# Patient Record
Sex: FEMALE | Race: BLACK OR AFRICAN AMERICAN | Employment: OTHER | URBAN - METROPOLITAN AREA
[De-identification: names, ages, dates, MRNs, and addresses within clinical notes are randomized per-mention and may not be internally consistent; named-entity substitution may affect disease eponyms.]

---

## 2017-03-31 ENCOUNTER — HOSPITAL ENCOUNTER (OUTPATIENT)
Dept: PHYSICAL THERAPY | Age: 82
End: 2017-03-31

## 2017-04-06 ENCOUNTER — HOSPITAL ENCOUNTER (OUTPATIENT)
Dept: PHYSICAL THERAPY | Age: 82
Discharge: HOME | End: 2017-04-06
Payer: MEDICARE

## 2017-04-06 PROCEDURE — 97161 PT EVAL LOW COMPLEX 20 MIN: CPT

## 2017-04-06 PROCEDURE — G8981 BODY POS CURRENT STATUS: HCPCS

## 2017-04-06 PROCEDURE — 97110 THERAPEUTIC EXERCISES: CPT

## 2017-04-06 PROCEDURE — G8982 BODY POS GOAL STATUS: HCPCS
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
## 2017-04-06 NOTE — PROGRESS NOTES
[facility]  [address]  [place], [address]  Phone: [phone]  Fax: [phone]    Plan of Care/Statement of Necessity for Physical Therapy Services  2-15    Patient name: Wilfrid Pathak  : 10/11/1932  Provider#: [id number]  Referral source: JACOB Michaud      Medical/Treatment Diagnosis: Neck pain [M54.2]     Prior Hospitalization: see medical history     Comorbidities: HTN controlled w/ meds   Prior Level of Function: able to sit, cook, perform ADLs w/out pain/limitation   Medications: Verified on Patient Summary List  Start of Care: 2017      Onset Date: long hx   The Plan of Care and following information is based on the information from the initial evaluation.     Assessment/ key information: pt is a 80year old female presents w/ signs/sx consistent w/ cerv DDD, postural dysfunction     Evaluation Complexity History LOW Complexity : Zero comorbidities / personal factors that will impact the outcome / POC; Examination LOW Complexity : 1-2 Standardized tests and measures addressing body structure, function, activity limitation and / or participation in recreation  ;Presentation LOW Complexity : Stable, uncomplicated  ;Clinical Decision Making MEDIUM Complexity : FOTO score of 26-74  Overall Complexity Rating: LOW     Problem List: pain affecting function, decrease ROM, decrease strength, decrease ADL/ functional abilitiies, decrease activity tolerance and decrease flexibility/ joint mobility   Treatment Plan may include any combination of the following: Therapeutic exercise, Therapeutic activities, Neuromuscular re-education, Physical agent/modality, Manual therapy and Patient education  Patient / Family readiness to learn indicated by: asking questions, trying to perform skills and interest  Persons(s) to be included in education: patient (P) and family support person (FSP);list   Barriers to Learning/Limitations: yes;  other memory   Patient Goal (s): anything to help pain go away  Patient Self Reported Health Status: fair  Rehabilitation Potential: fair    Short Term Goals: To be accomplished in 1-2 weeks:  1) Pt will be independent with HEP  2) Pt will demonstrate understanding/application of postural principles without aggravation symptoms  3) Pt will report >/=25% decrease in symptoms  4) Pt will report understanding/application of sleeping position recommendations to minimize aggravation of symptoms     Long Term Goals: To be accomplished in 4-6 weeks:  1) Pt will report >/=50% decrease in neck pain   2) Pt will demonstrate ability to hold head upright >/= 10 seconds for improved postural strength/stability   3) Pt w/ demonstrate AROM cerv spine w/out incr pain for improved mobility     Frequency / Duration: Patient to be seen 1-2 times per week for 4-6 weeks. Patient/ Caregiver education and instruction: self care, activity modification, brace/ splint application and exercises    [x]  Plan of care has been reviewed with CHANDAN    G-Codes (GP)  Position   Current  CL= 60-79%   Goal  CK= 40-59%    The severity rating is based on clinical judgment and the FOTO Score score. Certification Period: 4/6/2017-7/1/2017    Sammi Douglass, PT 4/6/2017 3:02 PM    ________________________________________________________________________    I certify that the above Therapy Services are being furnished while the patient is under my care. I agree with the treatment plan and certify that this therapy is necessary.     [de-identified] Signature:____________________  Date:____________Time: _________

## 2017-04-12 ENCOUNTER — HOSPITAL ENCOUNTER (OUTPATIENT)
Dept: PHYSICAL THERAPY | Age: 82
Discharge: HOME | End: 2017-04-12
Payer: MEDICARE

## 2017-04-12 PROCEDURE — 97110 THERAPEUTIC EXERCISES: CPT

## 2017-04-12 PROCEDURE — 97140 MANUAL THERAPY 1/> REGIONS: CPT

## 2017-04-12 NOTE — PROGRESS NOTES
PT DAILY TREATMENT NOTE - [facility] 2-15    Patient Name: Diane Pak  Date:2017  : 10/11/1932  [x]  Patient  Verified  Payor: Lalitha Edward / Plan: [place] MEDICARE PART A & B / Product Type: Medicare /    In time: 3548 AM  Out time: 1135 AM  Total Treatment Time (min): 40  Total Timed Codes (min):  25  1:1 Treatment Time ( only): 25  Visit #: 2     Treatment Area: Neck pain [M54.2]    SUBJECTIVE  Pain Level (0-10 scale): 0/10  Any medication changes, allergies to medications, adverse drug reactions, diagnosis change, or new procedure performed?: [x] No    [] Yes (see summary sheet for update)  Subjective functional status/changes:   [] No changes reported  Pt reports \"hasn't hurt since you put that heat on me last time\"; pt reports shldr sore after initial visit; pt became emotional when discussing whether or not sleeping in bed at home, reports \"not an option at this time\"     OBJECTIVE    Modality rationale: decrease pain and increase tissue extensibility to improve the patients ability to sit, cook, perform ADLs w/out pain/limitation    Min Type Additional Details    [] Estim: []Att   []Unatt        []TENS instruct                  []IFC  []Premod   []NMES                     []Other:  []w/US   []w/ice   []w/heat  Position:  Location:    []  Traction: [] Cervical       []Lumbar                       [] Prone          []Supine                       []Intermittent   []Continuous Lbs:  [] before manual  [] after manual  []w/heat    []  Ultrasound: []Continuous   [] Pulsed at:                           []1MHz   []3MHz Location:  W/cm2:    [] Paraffin         Location:   []w/heat   10 []  Ice     [x]  Heat  []  Ice massage Position: sup  Location: thor spine, neck     []  Laser  []  Other: Position:  Location:      []  Vasopneumatic Device Pressure:       [] lo [] med [] hi   Temperature:      [x] Skin assessment post-treatment:  [x]intact []redness- no adverse reaction    []redness - adverse reaction:     15 min Therapeutic Exercise:  [x] See flow sheet : review HEP, review postural recommendations    Rationale: increase ROM and increase strength to improve the patients ability to sit, cook, perform ADLs w/out pain/limitation     10 min Manual Therapy:  STM bilat cerv paraspinal mm., bilat UT, lev scap mm. Rationale: decrease pain, increase ROM, increase tissue extensibility and decrease trigger points to improve the patients ability to sit, cook, perform ADLs w/out pain/limitation           With   [x] TE   [] TA   [] neuro   [] other: Patient Education: [x] Review HEP    [] Progressed/Changed HEP based on:   [x] positioning   [x] body mechanics   [] transfers   [x] heat/ice application    [] other:      Other Objective/Functional Measures:   nt     Pain Level (0-10 scale) post treatment: 0/10    ASSESSMENT/Changes in Function:     Pt w/ difficulty cherie STM due to severe tenderness, requested we discontinue and to transition to sitting which she did by flexing forward, then complaining of HA type sx; sx resolved and pt reviewed HEP, required cueing for technique w/ all ex; attempted to discuss sleeping position w/ pt and she got very emotional and stated that sleeping in the bed is not an option and she does not want to discuss as it will make her cry     Patient will continue to benefit from skilled PT services to modify and progress therapeutic interventions, address functional mobility deficits, address ROM deficits, address strength deficits, analyze and address soft tissue restrictions, analyze and cue movement patterns, analyze and modify body mechanics/ergonomics and assess and modify postural abnormalities to attain remaining goals.      []  See Plan of Care  []  See progress note/recertification  []  See Discharge Summary         Progress towards goals / Updated goals:  nt    PLAN  []  Upgrade activities as tolerated     [x]  Continue plan of care  []  Update interventions per flow sheet       []  Discharge due to:_  []  Other:_      Dawn Jackson, PT 4/12/2017  11:48 AM

## 2017-04-20 ENCOUNTER — APPOINTMENT (OUTPATIENT)
Dept: PHYSICAL THERAPY | Age: 82
End: 2017-04-20
Payer: MEDICARE

## 2017-04-27 ENCOUNTER — APPOINTMENT (OUTPATIENT)
Dept: PHYSICAL THERAPY | Age: 82
End: 2017-04-27
Payer: MEDICARE

## 2017-05-04 ENCOUNTER — APPOINTMENT (OUTPATIENT)
Dept: PHYSICAL THERAPY | Age: 82
End: 2017-05-04

## 2018-10-26 ENCOUNTER — APPOINTMENT (OUTPATIENT)
Dept: GENERAL RADIOLOGY | Age: 83
End: 2018-10-26
Attending: STUDENT IN AN ORGANIZED HEALTH CARE EDUCATION/TRAINING PROGRAM
Payer: MEDICARE

## 2018-10-26 ENCOUNTER — HOSPITAL ENCOUNTER (OUTPATIENT)
Age: 83
Setting detail: OBSERVATION
Discharge: NURSING, REHAB OR LONG-TERM CARE | End: 2018-10-31
Attending: STUDENT IN AN ORGANIZED HEALTH CARE EDUCATION/TRAINING PROGRAM | Admitting: INTERNAL MEDICINE
Payer: MEDICARE

## 2018-10-26 ENCOUNTER — APPOINTMENT (OUTPATIENT)
Dept: CT IMAGING | Age: 83
End: 2018-10-26
Attending: STUDENT IN AN ORGANIZED HEALTH CARE EDUCATION/TRAINING PROGRAM
Payer: MEDICARE
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
## 2018-10-26 DIAGNOSIS — E87.6 HYPOKALEMIA: Primary | ICD-10-CM

## 2018-10-26 PROBLEM — E11.649 TYPE 2 DIABETES MELLITUS WITH HYPOGLYCEMIA (HCC): Status: ACTIVE | Noted: 2018-10-26

## 2018-10-26 PROBLEM — M19.90 ARTHRITIS: Chronic | Status: ACTIVE | Noted: 2018-10-26

## 2018-10-26 PROBLEM — I10 HYPERTENSION: Chronic | Status: ACTIVE | Noted: 2018-10-26

## 2018-10-26 PROBLEM — E16.2 HYPOGLYCEMIA: Status: ACTIVE | Noted: 2018-10-26

## 2018-10-26 PROBLEM — R65.10 SIRS (SYSTEMIC INFLAMMATORY RESPONSE SYNDROME) (HCC): Status: ACTIVE | Noted: 2018-10-26

## 2018-10-26 PROBLEM — R53.1 GENERALIZED WEAKNESS: Status: ACTIVE | Noted: 2018-10-26

## 2018-10-26 LAB
ALBUMIN SERPL-MCNC: 3.3 G/DL (ref 3.5–5)
ALBUMIN/GLOB SERPL: 0.6 {RATIO} (ref 1.1–2.2)
ALP SERPL-CCNC: 55 U/L (ref 45–117)
ALT SERPL-CCNC: 25 U/L (ref 12–78)
ANION GAP SERPL CALC-SCNC: 11 MMOL/L (ref 5–15)
APPEARANCE UR: CLEAR
AST SERPL-CCNC: 67 U/L (ref 15–37)
ATRIAL RATE: 97 BPM
BACTERIA URNS QL MICRO: NEGATIVE /HPF
BASOPHILS # BLD: 0 K/UL (ref 0–0.1)
BASOPHILS NFR BLD: 0 % (ref 0–1)
BILIRUB SERPL-MCNC: 1.4 MG/DL (ref 0.2–1)
BILIRUB UR QL CFM: NEGATIVE
BUN SERPL-MCNC: 6 MG/DL (ref 6–20)
BUN/CREAT SERPL: 8 (ref 12–20)
CALCIUM SERPL-MCNC: 8.9 MG/DL (ref 8.5–10.1)
CALCULATED P AXIS, ECG09: 71 DEGREES
CALCULATED R AXIS, ECG10: -70 DEGREES
CALCULATED T AXIS, ECG11: 91 DEGREES
CHLORIDE SERPL-SCNC: 110 MMOL/L (ref 97–108)
CO2 SERPL-SCNC: 19 MMOL/L (ref 21–32)
COLOR UR: ABNORMAL
COMMENT, HOLDF: NORMAL
CREAT SERPL-MCNC: 0.78 MG/DL (ref 0.55–1.02)
DIAGNOSIS, 93000: NORMAL
DIFFERENTIAL METHOD BLD: ABNORMAL
EOSINOPHIL # BLD: 0.4 K/UL (ref 0–0.4)
EOSINOPHIL NFR BLD: 3 % (ref 0–7)
EPITH CASTS URNS QL MICRO: ABNORMAL /LPF
ERYTHROCYTE [DISTWIDTH] IN BLOOD BY AUTOMATED COUNT: 20.5 % (ref 11.5–14.5)
GLOBULIN SER CALC-MCNC: 5.4 G/DL (ref 2–4)
GLUCOSE BLD STRIP.AUTO-MCNC: 132 MG/DL (ref 65–100)
GLUCOSE BLD STRIP.AUTO-MCNC: 57 MG/DL (ref 65–100)
GLUCOSE BLD STRIP.AUTO-MCNC: 91 MG/DL (ref 65–100)
GLUCOSE SERPL-MCNC: 61 MG/DL (ref 65–100)
GLUCOSE UR STRIP.AUTO-MCNC: NEGATIVE MG/DL
HCT VFR BLD AUTO: 39.8 % (ref 35–47)
HGB BLD-MCNC: 11.8 G/DL (ref 11.5–16)
HGB UR QL STRIP: NEGATIVE
IMM GRANULOCYTES # BLD: 0 K/UL (ref 0–0.04)
IMM GRANULOCYTES NFR BLD AUTO: 0 % (ref 0–0.5)
KETONES UR QL STRIP.AUTO: 80 MG/DL
LACTATE SERPL-SCNC: 1.9 MMOL/L (ref 0.4–2)
LEUKOCYTE ESTERASE UR QL STRIP.AUTO: NEGATIVE
LYMPHOCYTES # BLD: 0.7 K/UL (ref 0.8–3.5)
LYMPHOCYTES NFR BLD: 5 % (ref 12–49)
MAGNESIUM SERPL-MCNC: 2 MG/DL (ref 1.6–2.4)
MCH RBC QN AUTO: 23.7 PG (ref 26–34)
MCHC RBC AUTO-ENTMCNC: 29.6 G/DL (ref 30–36.5)
MCV RBC AUTO: 80.1 FL (ref 80–99)
MONOCYTES # BLD: 1 K/UL (ref 0–1)
MONOCYTES NFR BLD: 7 % (ref 5–13)
NEUTS SEG # BLD: 12.8 K/UL (ref 1.8–8)
NEUTS SEG NFR BLD: 85 % (ref 32–75)
NITRITE UR QL STRIP.AUTO: NEGATIVE
NRBC # BLD: 0 K/UL (ref 0–0.01)
NRBC BLD-RTO: 0 PER 100 WBC
P-R INTERVAL, ECG05: 182 MS
PH UR STRIP: 6 [PH] (ref 5–8)
PLATELET # BLD AUTO: 415 K/UL (ref 150–400)
PMV BLD AUTO: 11.6 FL (ref 8.9–12.9)
POTASSIUM SERPL-SCNC: 2.8 MMOL/L (ref 3.5–5.1)
PROT SERPL-MCNC: 8.7 G/DL (ref 6.4–8.2)
PROT UR STRIP-MCNC: 30 MG/DL
Q-T INTERVAL, ECG07: 310 MS
QRS DURATION, ECG06: 88 MS
QTC CALCULATION (BEZET), ECG08: 393 MS
RBC # BLD AUTO: 4.97 M/UL (ref 3.8–5.2)
RBC #/AREA URNS HPF: ABNORMAL /HPF (ref 0–5)
RBC MORPH BLD: ABNORMAL
SAMPLES BEING HELD,HOLD: NORMAL
SERVICE CMNT-IMP: ABNORMAL
SERVICE CMNT-IMP: ABNORMAL
SERVICE CMNT-IMP: NORMAL
SODIUM SERPL-SCNC: 140 MMOL/L (ref 136–145)
SP GR UR REFRACTOMETRY: 1.02 (ref 1–1.03)
TROPONIN I SERPL-MCNC: <0.05 NG/ML
TROPONIN I SERPL-MCNC: <0.05 NG/ML
UR CULT HOLD, URHOLD: NORMAL
UROBILINOGEN UR QL STRIP.AUTO: 1 EU/DL (ref 0.2–1)
VENTRICULAR RATE, ECG03: 97 BPM
WBC # BLD AUTO: 14.9 K/UL (ref 3.6–11)
WBC URNS QL MICRO: ABNORMAL /HPF (ref 0–4)

## 2018-10-26 PROCEDURE — 70450 CT HEAD/BRAIN W/O DYE: CPT

## 2018-10-26 PROCEDURE — 82962 GLUCOSE BLOOD TEST: CPT

## 2018-10-26 PROCEDURE — 99218 HC RM OBSERVATION: CPT

## 2018-10-26 PROCEDURE — 96366 THER/PROPH/DIAG IV INF ADDON: CPT

## 2018-10-26 PROCEDURE — 81001 URINALYSIS AUTO W/SCOPE: CPT | Performed by: STUDENT IN AN ORGANIZED HEALTH CARE EDUCATION/TRAINING PROGRAM

## 2018-10-26 PROCEDURE — 74011250637 HC RX REV CODE- 250/637: Performed by: STUDENT IN AN ORGANIZED HEALTH CARE EDUCATION/TRAINING PROGRAM

## 2018-10-26 PROCEDURE — 71045 X-RAY EXAM CHEST 1 VIEW: CPT

## 2018-10-26 PROCEDURE — 83735 ASSAY OF MAGNESIUM: CPT | Performed by: INTERNAL MEDICINE

## 2018-10-26 PROCEDURE — G8978 MOBILITY CURRENT STATUS: HCPCS

## 2018-10-26 PROCEDURE — 80053 COMPREHEN METABOLIC PANEL: CPT | Performed by: STUDENT IN AN ORGANIZED HEALTH CARE EDUCATION/TRAINING PROGRAM

## 2018-10-26 PROCEDURE — 93005 ELECTROCARDIOGRAM TRACING: CPT

## 2018-10-26 PROCEDURE — 77030005563 HC CATH URETH INT MMGH -A

## 2018-10-26 PROCEDURE — G8979 MOBILITY GOAL STATUS: HCPCS

## 2018-10-26 PROCEDURE — 36415 COLL VENOUS BLD VENIPUNCTURE: CPT | Performed by: INTERNAL MEDICINE

## 2018-10-26 PROCEDURE — 97116 GAIT TRAINING THERAPY: CPT

## 2018-10-26 PROCEDURE — 99285 EMERGENCY DEPT VISIT HI MDM: CPT

## 2018-10-26 PROCEDURE — 74011250636 HC RX REV CODE- 250/636: Performed by: STUDENT IN AN ORGANIZED HEALTH CARE EDUCATION/TRAINING PROGRAM

## 2018-10-26 PROCEDURE — 96374 THER/PROPH/DIAG INJ IV PUSH: CPT

## 2018-10-26 PROCEDURE — 74011000250 HC RX REV CODE- 250: Performed by: STUDENT IN AN ORGANIZED HEALTH CARE EDUCATION/TRAINING PROGRAM

## 2018-10-26 PROCEDURE — 74011250636 HC RX REV CODE- 250/636: Performed by: INTERNAL MEDICINE

## 2018-10-26 PROCEDURE — 85025 COMPLETE CBC W/AUTO DIFF WBC: CPT | Performed by: STUDENT IN AN ORGANIZED HEALTH CARE EDUCATION/TRAINING PROGRAM

## 2018-10-26 PROCEDURE — 74011250637 HC RX REV CODE- 250/637: Performed by: INTERNAL MEDICINE

## 2018-10-26 PROCEDURE — 96365 THER/PROPH/DIAG IV INF INIT: CPT

## 2018-10-26 PROCEDURE — 51701 INSERT BLADDER CATHETER: CPT

## 2018-10-26 PROCEDURE — 83605 ASSAY OF LACTIC ACID: CPT | Performed by: INTERNAL MEDICINE

## 2018-10-26 PROCEDURE — 84484 ASSAY OF TROPONIN QUANT: CPT | Performed by: INTERNAL MEDICINE

## 2018-10-26 PROCEDURE — 97161 PT EVAL LOW COMPLEX 20 MIN: CPT

## 2018-10-26 RX ORDER — DEXTROSE, SODIUM CHLORIDE, SODIUM LACTATE, POTASSIUM CHLORIDE, AND CALCIUM CHLORIDE 5; .6; .31; .03; .02 G/100ML; G/100ML; G/100ML; G/100ML; G/100ML
75 INJECTION, SOLUTION INTRAVENOUS CONTINUOUS
Status: DISPENSED | OUTPATIENT
Start: 2018-10-26 | End: 2018-10-27

## 2018-10-26 RX ORDER — MELATONIN
1000 DAILY
Status: DISCONTINUED | OUTPATIENT
Start: 2018-10-27 | End: 2018-10-31 | Stop reason: HOSPADM

## 2018-10-26 RX ORDER — LATANOPROST 50 UG/ML
1 SOLUTION/ DROPS OPHTHALMIC
COMMUNITY
End: 2019-05-10

## 2018-10-26 RX ORDER — POTASSIUM CHLORIDE 7.45 MG/ML
10 INJECTION INTRAVENOUS
Status: COMPLETED | OUTPATIENT
Start: 2018-10-26 | End: 2018-10-26

## 2018-10-26 RX ORDER — AMLODIPINE BESYLATE 5 MG/1
5 TABLET ORAL DAILY
COMMUNITY
End: 2018-11-23

## 2018-10-26 RX ORDER — ONDANSETRON 2 MG/ML
4 INJECTION INTRAMUSCULAR; INTRAVENOUS
Status: DISCONTINUED | OUTPATIENT
Start: 2018-10-26 | End: 2018-10-31 | Stop reason: HOSPADM

## 2018-10-26 RX ORDER — POTASSIUM CHLORIDE 750 MG/1
40 TABLET, FILM COATED, EXTENDED RELEASE ORAL ONCE
Status: COMPLETED | OUTPATIENT
Start: 2018-10-26 | End: 2018-10-26

## 2018-10-26 RX ORDER — HYDROCHLOROTHIAZIDE 12.5 MG/1
12.5 TABLET ORAL DAILY
COMMUNITY
End: 2018-11-23

## 2018-10-26 RX ORDER — POTASSIUM CHLORIDE 20MEQ/15ML
20 LIQUID (ML) ORAL
Status: COMPLETED | OUTPATIENT
Start: 2018-10-26 | End: 2018-10-26

## 2018-10-26 RX ORDER — AMLODIPINE BESYLATE 5 MG/1
5 TABLET ORAL DAILY
Status: DISCONTINUED | OUTPATIENT
Start: 2018-10-27 | End: 2018-10-31 | Stop reason: HOSPADM

## 2018-10-26 RX ORDER — LATANOPROST 50 UG/ML
1 SOLUTION/ DROPS OPHTHALMIC
Status: DISCONTINUED | OUTPATIENT
Start: 2018-10-26 | End: 2018-10-31 | Stop reason: HOSPADM

## 2018-10-26 RX ORDER — SODIUM CHLORIDE 0.9 % (FLUSH) 0.9 %
5-10 SYRINGE (ML) INJECTION EVERY 8 HOURS
Status: DISCONTINUED | OUTPATIENT
Start: 2018-10-26 | End: 2018-10-31 | Stop reason: HOSPADM

## 2018-10-26 RX ORDER — MAGNESIUM SULFATE 100 %
4 CRYSTALS MISCELLANEOUS AS NEEDED
Status: DISCONTINUED | OUTPATIENT
Start: 2018-10-26 | End: 2018-10-31 | Stop reason: HOSPADM

## 2018-10-26 RX ORDER — SODIUM CHLORIDE 0.9 % (FLUSH) 0.9 %
5-10 SYRINGE (ML) INJECTION AS NEEDED
Status: DISCONTINUED | OUTPATIENT
Start: 2018-10-26 | End: 2018-10-31 | Stop reason: HOSPADM

## 2018-10-26 RX ORDER — POTASSIUM CHLORIDE 750 MG/1
20 TABLET, FILM COATED, EXTENDED RELEASE ORAL
Status: DISCONTINUED | OUTPATIENT
Start: 2018-10-26 | End: 2018-10-26

## 2018-10-26 RX ORDER — DEXTROSE 50 % IN WATER (D50W) INTRAVENOUS SYRINGE
25-50 AS NEEDED
Status: DISCONTINUED | OUTPATIENT
Start: 2018-10-26 | End: 2018-10-31 | Stop reason: HOSPADM

## 2018-10-26 RX ORDER — SODIUM CHLORIDE 9 MG/ML
75 INJECTION, SOLUTION INTRAVENOUS CONTINUOUS
Status: DISCONTINUED | OUTPATIENT
Start: 2018-10-26 | End: 2018-10-26

## 2018-10-26 RX ORDER — INSULIN LISPRO 100 [IU]/ML
INJECTION, SOLUTION INTRAVENOUS; SUBCUTANEOUS
Status: DISCONTINUED | OUTPATIENT
Start: 2018-10-26 | End: 2018-10-31 | Stop reason: HOSPADM

## 2018-10-26 RX ORDER — ACETAMINOPHEN 325 MG/1
650 TABLET ORAL
Status: DISCONTINUED | OUTPATIENT
Start: 2018-10-26 | End: 2018-10-31 | Stop reason: HOSPADM

## 2018-10-26 RX ADMIN — DEXTROSE MONOHYDRATE 250 ML: 10 INJECTION, SOLUTION INTRAVENOUS at 12:23

## 2018-10-26 RX ADMIN — POTASSIUM CHLORIDE 10 MEQ: 7.46 INJECTION, SOLUTION INTRAVENOUS at 18:20

## 2018-10-26 RX ADMIN — SODIUM CHLORIDE 1000 ML: 900 INJECTION, SOLUTION INTRAVENOUS at 14:13

## 2018-10-26 RX ADMIN — SODIUM CHLORIDE, SODIUM LACTATE, POTASSIUM CHLORIDE, CALCIUM CHLORIDE, AND DEXTROSE MONOHYDRATE 75 ML/HR: 600; 310; 30; 20; 5 INJECTION, SOLUTION INTRAVENOUS at 20:10

## 2018-10-26 RX ADMIN — POTASSIUM CHLORIDE 10 MEQ: 7.46 INJECTION, SOLUTION INTRAVENOUS at 22:17

## 2018-10-26 RX ADMIN — POTASSIUM CHLORIDE 20 MEQ: 20 SOLUTION ORAL at 14:45

## 2018-10-26 RX ADMIN — POTASSIUM CHLORIDE 10 MEQ: 10 INJECTION, SOLUTION INTRAVENOUS at 14:13

## 2018-10-26 RX ADMIN — POTASSIUM CHLORIDE 40 MEQ: 750 TABLET, FILM COATED, EXTENDED RELEASE ORAL at 18:26

## 2018-10-26 RX ADMIN — Medication 10 ML: at 18:26

## 2018-10-26 RX ADMIN — POTASSIUM CHLORIDE 10 MEQ: 7.46 INJECTION, SOLUTION INTRAVENOUS at 21:00

## 2018-10-26 RX ADMIN — POTASSIUM CHLORIDE 10 MEQ: 7.46 INJECTION, SOLUTION INTRAVENOUS at 20:12
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
## 2018-10-26 NOTE — SENIOR SERVICES NOTE
physical Therapy Emergency Department EVALUATION Patient: Juan Chavarria (80 y.o. female) Date: 10/26/2018 Primary Diagnosis: No admission diagnoses are documented for this encounter. Precautions: Fall risk ASSESSMENT : 
Chart reviewed. Patient cleared to be seen in the ED. Patient presents from home after a fall two days ago. Patient admits to not being ambulatory since the fall, though timeline is skewed and question some memory defecits. Patient reporting that her  assists her out of bed and she has been sitting on the floor. She does not have a RW at home. Patient also states that she has not eaten since Wednesday. CM contacted for social concerns. Today, patient needing max assist to sit EOB. Once sitting, vitals stable and patient able to maintain sitting balance without assistance. Patient is very fearful of falling. Complaining of pain in the chest. However does not seem cardiac in nature, rather a sternal pain which is tender to touch. Patient ambulating in the hallway requiring a RW x 50 feet, CGA. Slow gait pattern, narrow JASPER observed. Patient not wanting to let go of the walker but at the same time does not want a walker to go home with stating \"My sister falls with the walker all the time\". Secondary to poor bed mobility and safety awareness concerned about patient's safety at home. Discussed with MD and CM. Will put patient on the weekend list, though may not be seen until Monday. PLAN : 
Frequency/Duration: Pending admission, the patient will be followed by physical therapy 5 times a week to address goals. If admitted, Recommendations and Planned Interventions: 
[x]           Bed Mobility Training             []    Neuromuscular Re-Education 
[x]           Transfer Training                   []    Orthotic/Prosthetic Training 
[x]           Gait Training                         []    Modalities [x]           Therapeutic Exercises           []    Edema Management/Control 
[x]           Therapeutic Activities            []    Patient and Family Training/Education 
[]           Other (comment): 
 
 
Discharge Recommendations:  
 
[x]   Home with care aides, increased family support and HHPT [x]   Skilled nursing facility []   Admission to hospital with rehab likely needed 
[]   Inpatient rehab referral 
[]   Outpatient physical therapy referral 
[]   Other: Further Equipment Recommendations for Discharge: If home, will need a RW, however patient is refusing one at this time. []   Rolling walker with 5\" wheels 
[]   Crutches  
[]   Cane  
[]   Wheelchair  
[]   Other: COMMUNICATION/EDUCATION:  
Communication/Collaboration: 
[x]   Fall prevention education was provided and the patient/caregiver indicated understanding. [x]   Patient/family have participated as able and agree with findings and recommendations. []   Patient is unable to participate in plan of care at this time. Findings and recommendations were discussed with: MD physician and  SUBJECTIVE:  
Patient stated My  helps me to the floor. I'm afraid I'll fall sitting anywhere else. He bring me water.  OBJECTIVE DATA SUMMARY:  
HISTORY:   
Past Medical History:  
Diagnosis Date  Arthritis  Hypertension Past Surgical History:  
Procedure Laterality Date  HX HYSTERECTOMY  HX TONSILLECTOMY Prior Level of Function/Home Situation: Patient lives with  in a single level house. Personal factors and/or comorbidities impacting plan of care:  
 
Home Situation Home Environment: Private residence # Steps to Enter: 0 One/Two Story Residence: One story Living Alone: No 
Support Systems: Spouse/Significant Other/Partner, Family member(s) Patient Expects to be Discharged to[de-identified] Private residence Current DME Used/Available at Home: None(Will need RW) EXAMINATION/PRESENTATION/DECISION MAKING:  
Critical Behavior: 
Neurologic State: Alert Orientation Level: Oriented to place, Oriented to person, Disoriented to time, Oriented to situation Cognition: Follows commands Hearing: Auditory Auditory Impairment: None Strength:   
Strength: Grossly decreased, non-functional 
  
Tone & Sensation:  
Tone: Normal 
   
Coordination: 
Coordination: Generally decreased, functional 
Functional Mobility: 
Bed Mobility: 
Supine to Sit: Maximum assistance;Assist x1 Sit to Supine: Moderate assistance;Assist x1;Additional time Transfers: 
Sit to Stand: Contact guard assistance; Adaptive equipment Stand to Sit: Contact guard assistance; Adaptive equipment Balance:  
Sitting: Impaired Sitting - Static: Good (unsupported) Sitting - Dynamic: Fair (occasional) Standing: Impaired Standing - Static: Fair;Constant support Standing - Dynamic : Fair Ambulation/Gait Training: 
Distance (ft): 50 Feet (ft) Assistive Device: Gait belt;Walker, rolling Ambulation - Level of Assistance: Contact guard assistance; Additional time; Adaptive equipment Gait Abnormalities: Decreased step clearance;Shuffling gait Base of Support: Narrowed Speed/Malorie: Slow;Shuffled Step Length: Right shortened;Left shortened Special Tests: 
Tinetti test: 
 
Sitting Balance: 1 Arises: 1 Attempts to Rise: 1 Immediate Standing Balance: 1 Standing Balance: 1 Nudged: 0 Eyes Closed: 0 Turn 360 Degrees - Continuous/Discontinuous: 0 Turn 360 Degrees - Steady/Unsteady: 0 Sitting Down: 1 Balance Score: 6 Indication of Gait: 0 
R Step Length/Height: 0 
L Step Length/Height: 0 
R Foot Clearance: 1 L Foot Clearance: 1 Step Symmetry: 1 Step Continuity: 1 Path: 1 Trunk: 0 Walking Time: 1 Gait Score: 6 Total Score: 12 Tinetti Test and G-code impairment scale: 
Percentage of Impairment CH 
 
0% 
 CI 
 
1-19% CJ 
 
20-39% CK 
 
40-59% CL 
 
60-79% CM 
 
80-99% CN  
 
100% Tinetti Score 0-28 28 23-27 17-22 12-16 6-11 1-5 0 Tinetti Tool Score Risk of Falls <19 = High Fall Risk 19-24 = Moderate Fall Risk 25-28 = Low Fall Risk Marva CONRAD. Performance-Oriented Assessment of Mobility Problems in Elderly Patients. [facility] 66; [id number]. (Scoring Description: PT Bulletin Feb. 10, 1993) Older adults: Yamilet Alarcon et al, 2009; n = [address] elderly evaluated with ABC, ANUSHKA, ADL, and IADL) · Mean ANUSHKA score for males aged 69-68 years = 26.21(3.40) · Mean ANUSHKA score for females age 69-68 years = 25.16(4.30) · Mean ANUSHKA score for males over 80 years = 23.29(6.02) · Mean ANUSHKA score for females over 80 years = 17.20(8.32) In compliance with CMSs Claims Based Outcome Reporting, the following G-code set was chosen for this patient based on their primary functional limitation being treated: The outcome measure chosen to determine the severity of the functional limitation was the Tinetti with a score of 12/28 which was correlated with the impairment scale. ? Mobility - Walking and Moving Around:  
  - CURRENT STATUS: CK - 40%-59% impaired, limited or restricted  - GOAL STATUS: CK - 40%-59% impaired, limited or restricted  - D/C STATUS:  ---------------To be determined--------------- Physical Therapy Evaluation Charge Determination History Examination Presentation Decision-Making MEDIUM  Complexity : 1-2 comorbidities / personal factors will impact the outcome/ POC  LOW Complexity : 1-2 Standardized tests and measures addressing body structure, function, activity limitation and / or participation in recreation  LOW Complexity : Stable, uncomplicated  LOW Complexity : FOTO score of  Based on the above components, the patient evaluation is determined to be of the following complexity level: LOW Pain: 
Pain Scale 1: Numeric (0 - 10) Pain Intensity 1: 4 Pain Location 1: Back Activity Tolerance:  
Decreased. O2 stable on RA Please refer to the flowsheet for vital signs taken during this treatment. After treatment: []   Patient left in no apparent distress sitting up in chair 
[x]   Patient left in no apparent distress in bed 
[x]   Call bell left within reach [x]   Nursing notified 
[]   Caregiver present 
[]   Bed alarm activated Thank you for this referral. 
Kailyn Noonan PT, DPT Geriatric Clinical Specialist  
 Time Calculation: 25 mins

## 2018-10-26 NOTE — PROGRESS NOTES
Admission Medication Reconciliation: 
 
Information obtained from: [facility] Query and [address] [place] [address] Significant PMH/Disease States:  
Past Medical History:  
Diagnosis Date  Arthritis  Hypertension Chief Complaint for this Admission:  No chief complaint on file. Allergies:  Latex; Erythromycin; Neosporin [neomycin-bacitracnzn-polymyxnb]; and Penicillins Prior to Admission Medications:  
Prior to Admission Medications Prescriptions Last Dose Informant Patient Reported? Taking? amLODIPine (NORVASC) 5 mg tablet 10/19/2018 at Unknown time  Yes Yes Sig: Take 5 mg by mouth daily. cholecalciferol, vitamin D3, (VITAMIN D3 PO) 10/19/2018 at Unknown time  Yes Yes Sig: Take 1 Tab by mouth daily. Strength unknown.  
hydroCHLOROthiazide (HYDRODIURIL) 12.5 mg tablet 10/19/2018 at Unknown time  Yes Yes Sig: Take 12.5 mg by mouth daily. latanoprost (XALATAN) 0.005 % ophthalmic solution 10/19/2018 at Unknown time  Yes Yes Sig: Administer 1 Drop to both eyes nightly. Facility-Administered Medications: None Comments/Recommendations:  
 
Spoke with patient regarding allergies and PTA medications. Patient was unfamiliar with her medications, but stated she fills her medications at [facility]. Used information from [facility] and [facility] to update PTA list. 
 
1) Reviewed and confirmed allergies. 2) Updated PTA medication list. Added medications listed above.  
 
-Amlodipine filled 9/13/18 for #30 w/ 6 refills -HCTZ filled 8/19/18 for #30 w/ 6 refills 
-Latanoprost filled 9/20/18 w/ 6 refills Of note, patient has a prescription for prednisone 5 mg daily that was last filled 7/24/18 for #30 tablets and 5 remaining refills. The patient reports she has not taken any medications in a few days. Kiki Acuña, PharmD

## 2018-10-26 NOTE — PROGRESS NOTES
.. TRANSFER - IN REPORT: 
 
Verbal report received from [address] (name) on Maria Luisa Merchant  being received from ED (unit) for routine progression of care Report consisted of patients Situation, Background, Assessment and  
Recommendations(SBAR). Information from the following report(s) SBAR, Kardex and MAR was reviewed with the receiving nurse. Opportunity for questions and clarification was provided. Assessment completed upon patients arrival to unit and care assumed.

## 2018-10-26 NOTE — ED TRIAGE NOTES
TRIAGE: Pt arrives via Medical transport from home where she lives with  after he found her on the floor. Pt states she has not eaten today. BG was 63 when checked by medical transport. Pt given some oral glucose in route. VSS within route. Pt arrives alert and oriented to person and place at baseline.

## 2018-10-26 NOTE — H&P
History & Physical 
Fernando Hendrickson MD, [facility] Date of admission: 10/26/2018 Patient name: Doyle Montana MRN: [id number] YOB: 1932 Age: 80 y.o. Primary care provider:  Jillian Matos MD  
 
Source of Information: patient, medical records Chief complaint: \"I fell. \" History of present illness Doyle Montana is a 80 y.o. female arthritis, HTN who was BIBEMS to the ED from home s/p fall and inability to walk. Patient states she fell 3 days ago, was unable to get up,  would help her get up to a different spot. She states she has not eaten or had anything to drink since she fell 3 days ago. Her mouth is dry. She has not been passing urine or stools since she fell. She does not know how she fell, reports hitting her head, but not losing consciousness. She denies f/n/v but c/o CP, L upper abdominal pain, and chills currently. ED triage VS were temp 98.8F, , RR 18, SpO2 100% on RA. In the ED, she received 250ml IV D10, KCl 10mEq IVx1, KCl 20mEq po x1 and 1L IVNS bolus x1. Pt reports feeling slightly better now than upon arrival. 
 
Past Medical History:  
Diagnosis Date  Arthritis  Hypertension Past Surgical History:  
Procedure Laterality Date  HX HYSTERECTOMY  HX TONSILLECTOMY Prior to Admission medications Medication Sig Start Date End Date Taking? Authorizing Provider  
amLODIPine (NORVASC) 5 mg tablet Take 5 mg by mouth daily. Yes Provider, Historical  
hydroCHLOROthiazide (HYDRODIURIL) 12.5 mg tablet Take 12.5 mg by mouth daily. Yes Provider, Historical  
latanoprost (XALATAN) 0.005 % ophthalmic solution Administer 1 Drop to both eyes nightly. Yes Provider, Historical  
cholecalciferol, vitamin D3, (VITAMIN D3 PO) Take 1 Tab by mouth daily. Strength unknown. Yes Provider, Historical  
 
Allergies Allergen Reactions  Latex Hives  Erythromycin Hives  Neosporin [Neomycin-Bacitracnzn-Polymyxnb] Rash  Penicillins Hives History reviewed. No pertinent family history. Social history Patient resides 
x  Independently With family care Assisted living SNF Ambulates 
x  Independently With cane Assisted walker Alcohol history  
x  None Social  
  Chronic Smoking history 
x  None Former smoker Current smoker Denies illicit drug use. Social History Tobacco Use Smoking Status Never Smoker Smokeless Tobacco Never Used Code status 
x  Full code DNR/DNI Code status discussed with the patient, and she requests full code status. Review of systems A comprehensive review of systems was negative except for that written in the History of Present Illness. Physical Examination Visit Vitals /55 (BP 1 Location: Right arm, BP Patient Position: At rest) Pulse 86 Temp 98.6 °F (37 °C) Resp 18 SpO2 100% O2 Device: Room air Gen: awake, NAD, cooperative, pleasant Head: NCAT 
EENT: PERRL, dry MM Neck: supple, no masses Lungs: CTAB, no w/r/r 
CVS: regular rhythm, normal rate, S1S2, no m/r/g appreciated, no peripheral edema, +pulses, chest wall TTP Abd: +BS, soft, NT, ND 
MSK: moves all extremities Neuro: AOx3, CN II-XII grossly intact, NFD, responds appropriately to questions and commands Skin: very dry and flaky skin, eyelid crusting Data Review EKG: NSR Rate 97, LAD 
 
24 Hour Results: 
Recent Results (from the past 24 hour(s)) GLUCOSE, POC Collection Time: 10/26/18 11:48 AM  
Result Value Ref Range Glucose (POC) 57 (L) 65 - 100 mg/dL Performed by Vijay Plan   
EKG, 12 LEAD, INITIAL Collection Time: 10/26/18 11:50 AM  
Result Value Ref Range Ventricular Rate 97 BPM  
 Atrial Rate 97 BPM  
 P-R Interval 182 ms QRS Duration 88 ms Q-T Interval 310 ms QTC Calculation (Bezet) 393 ms Calculated P Axis 71 degrees Calculated R Axis -70 degrees Calculated T Axis 91 degrees Diagnosis Normal sinus rhythm Left axis deviation Inferior infarct , age undetermined Anterolateral infarct , age undetermined No previous ECGs available Confirmed by Marybeth Cifuentes MD, [facility] ([id number]) on 10/26/2018 2:06:58 PM 
  
CBC WITH AUTOMATED DIFF Collection Time: 10/26/18 12:04 PM  
Result Value Ref Range WBC 14.9 (H) 3.6 - 11.0 K/uL  
 RBC 4.97 3.80 - 5.20 M/uL  
 HGB 11.8 11.5 - 16.0 g/dL HCT 39.8 35.0 - 47.0 % MCV 80.1 80.0 - 99.0 FL  
 MCH 23.7 (L) 26.0 - 34.0 PG  
 MCHC 29.6 (L) 30.0 - 36.5 g/dL RDW 20.5 (H) 11.5 - 14.5 % PLATELET 588 (H) 133 - 400 K/uL MPV 11.6 8.9 - 12.9 FL  
 NRBC 0.0 0  WBC ABSOLUTE NRBC 0.00 0.00 - 0.01 K/uL NEUTROPHILS 85 (H) 32 - 75 % LYMPHOCYTES 5 (L) 12 - 49 % MONOCYTES 7 5 - 13 % EOSINOPHILS 3 0 - 7 % BASOPHILS 0 0 - 1 % IMMATURE GRANULOCYTES 0 0.0 - 0.5 % ABS. NEUTROPHILS 12.8 (H) 1.8 - 8.0 K/UL  
 ABS. LYMPHOCYTES 0.7 (L) 0.8 - 3.5 K/UL  
 ABS. MONOCYTES 1.0 0.0 - 1.0 K/UL  
 ABS. EOSINOPHILS 0.4 0.0 - 0.4 K/UL  
 ABS. BASOPHILS 0.0 0.0 - 0.1 K/UL  
 ABS. IMM. GRANS. 0.0 0.00 - 0.04 K/UL  
 DF SMEAR SCANNED    
 RBC COMMENTS ANISOCYTOSIS 2+ 
    
 RBC COMMENTS MICROCYTOSIS 1+ 
    
 RBC COMMENTS FRED CELLS 3+ 
    
 RBC COMMENTS OVALOCYTES 1+ RBC COMMENTS POIKILOCYTOSIS 
1+ METABOLIC PANEL, COMPREHENSIVE Collection Time: 10/26/18 12:04 PM  
Result Value Ref Range Sodium 140 136 - 145 mmol/L Potassium 2.8 (L) 3.5 - 5.1 mmol/L Chloride 110 (H) 97 - 108 mmol/L  
 CO2 19 (L) 21 - 32 mmol/L Anion gap 11 5 - 15 mmol/L Glucose 61 (L) 65 - 100 mg/dL BUN 6 6 - 20 MG/DL Creatinine 0.78 0.55 - 1.02 MG/DL  
 BUN/Creatinine ratio 8 (L) 12 - 20 GFR est AA >60 >60 ml/min/1.73m2 GFR est non-AA >60 >60 ml/min/1.73m2  Calcium 8.9 8.5 - 10.1 MG/DL  
 Bilirubin, total 1.4 (H) 0.2 - 1.0 MG/DL  
 ALT (SGPT) 25 12 - 78 U/L  
 AST (SGOT) 67 (H) 15 - 37 U/L Alk. phosphatase 55 45 - 117 U/L Protein, total 8.7 (H) 6.4 - 8.2 g/dL Albumin 3.3 (L) 3.5 - 5.0 g/dL Globulin 5.4 (H) 2.0 - 4.0 g/dL A-G Ratio 0.6 (L) 1.1 - 2.2 SAMPLES BEING HELD Collection Time: 10/26/18 12:04 PM  
Result Value Ref Range SAMPLES BEING HELD 1RED 1BLUE   
 COMMENT Add-on orders for these samples will be processed based on acceptable specimen integrity and analyte stability, which may vary by analyte. URINALYSIS W/MICROSCOPIC Collection Time: 10/26/18 12:04 PM  
Result Value Ref Range Color DARK YELLOW Appearance CLEAR CLEAR Specific gravity 1.021 1.003 - 1.030    
 pH (UA) 6.0 5.0 - 8.0 Protein 30 (A) NEG mg/dL Glucose NEGATIVE  NEG mg/dL Ketone 80 (A) NEG mg/dL Blood NEGATIVE  NEG Urobilinogen 1.0 0.2 - 1.0 EU/dL Nitrites NEGATIVE  NEG Leukocyte Esterase NEGATIVE  NEG    
 WBC 0-4 0 - 4 /hpf  
 RBC 0-5 0 - 5 /hpf Epithelial cells FEW FEW /lpf Bacteria NEGATIVE  NEG /hpf URINE CULTURE HOLD SAMPLE Collection Time: 10/26/18 12:04 PM  
Result Value Ref Range Urine culture hold URINE ON HOLD IN MICROBIOLOGY DEPT FOR 3 DAYS. IF UNPRESERVED URINE IS SUBMITTED, IT CANNOT BE USED FOR ADDITIONAL TESTING AFTER 24 HRS, RECOLLECTION WILL BE REQUIRED. BILIRUBIN, CONFIRM Collection Time: 10/26/18 12:04 PM  
Result Value Ref Range Bilirubin UA, confirm NEGATIVE  NEG    
GLUCOSE, POC Collection Time: 10/26/18 12:41 PM  
Result Value Ref Range Glucose (POC) 132 (H) 65 - 100 mg/dL Performed by Karen Rome Recent Labs 10/26/18 
1204 WBC 14.9* HGB 11.8 HCT 39.8 * Recent Labs 10/26/18 
1204   
K 2.8*  
* CO2 19* GLU 61* BUN 6  
CREA 0.78  
CA 8.9 ALB 3.3* SGOT 67* ALT 25 Imaging CXR portable FINDINGS: 
 Left lower lung is obscured by cardiomegaly. Left upper lung and right lung are clear. There is no pulmonary edema. There is no evident pneumothorax, adenopathy, or pleural effusion. 
  
IMPRESSION: No Acute Disease. Assessment and Plan Principal Problem: 
  Generalized weakness s/p fall with inability to walk (POA) - place in OBS medical floor 
- fall precautions 
- PT/OT eval: may need acute rehab on discharge 
- check B12, D, TSH  
- start IV D5LR x24h 
- check lactic acid Active Problems: Hypoglycemia with no h/o DM2 (POA) - likely due to no po intake x3 days since fall - s/p D10 in ED 
- start IV D5LR x24h 
- Accuchecks Hypokalemia (POA) - replete prn 
 
  SIRS (systemic inflammatory response syndrome) - leucocytosis, tachycardia on admission 
- check lactic acid 
- likely noninfectious etiology and more likely due to hypovolemia 
- start IVF 
- CXR and UA unremarkable 
- no indication for abx at this time Hypertension - controlled on home medication Arthritis - pain control with prn APAP Chest pain (POA) - seems more MSK etiology as pt is tender on exam and states she hurt her chest when she fell 
- check troponin - pain control Diet: regular Activity: ambulate with assistance DVT prophylaxis: SCDs Isolation precautions: none Consultations: none Anticipated disposition: Likely SNF vs acute rehab Signed by: Charlie Márquez MD  
 October 26, 2018 at 4:06 PM

## 2018-10-26 NOTE — ROUTINE PROCESS
TRANSFER - OUT REPORT: 
 
Verbal report given to TRISTAN Garcia(name) on Bard Kamuela  being transferred to 6E(unit) for routine progression of care Report consisted of patients Situation, Background, Assessment and  
Recommendations(SBAR). Information from the following report(s) SBAR, ED Summary, [facility] and Recent Results was reviewed with the receiving nurse. Lines:  
Peripheral IV 10/26/18 Right Antecubital (Active) Site Assessment Clean, dry, & intact 10/26/2018 11:51 AM  
Phlebitis Assessment 0 10/26/2018 11:51 AM  
Infiltration Assessment 0 10/26/2018 11:51 AM  
Dressing Status Clean, dry, & intact 10/26/2018 11:51 AM  
Dressing Type Transparent 10/26/2018 11:51 AM  
Hub Color/Line Status Blue;Patent; Flushed 10/26/2018 11:51 AM  
Action Taken Blood drawn 10/26/2018 11:51 AM  
  
 
Opportunity for questions and clarification was provided. Patient transported with: 
Transport

## 2018-10-26 NOTE — PROGRESS NOTES
Call placed to sister Loren - introduced to role of CM and informed permission obtained by patient to communicate. Sister's last name is Arti Torre - additional history obtained : call received from [id number] patient had fallen and sister called 46. Patient is fearful of falling. Shani Cortez talks to her sister by phone and does not see often. Sister acknowledges patient needs additional support and has noticed \"having issue\" for months with memory and questions dementia. Per sister has not been able to sleep for months. Informed CM of new PCP appointment scheduled for next week 10/30 or 10/30/18 w/ Dr. Mary Dhaliwal / [facility] [phone]. [employer] Medicare A/B and [employer] are insurances on file in other MR chart. CM will provided updates to Dr.Dustin Arely Houser.

## 2018-10-27 LAB
25(OH)D3 SERPL-MCNC: 41.4 NG/ML (ref 30–100)
ALBUMIN SERPL-MCNC: 2.5 G/DL (ref 3.5–5)
ALBUMIN/GLOB SERPL: 0.6 {RATIO} (ref 1.1–2.2)
ALP SERPL-CCNC: 45 U/L (ref 45–117)
ALT SERPL-CCNC: 21 U/L (ref 12–78)
ANION GAP SERPL CALC-SCNC: 13 MMOL/L (ref 5–15)
AST SERPL-CCNC: 52 U/L (ref 15–37)
BASOPHILS # BLD: 0 K/UL (ref 0–0.1)
BASOPHILS NFR BLD: 0 % (ref 0–1)
BILIRUB SERPL-MCNC: 0.9 MG/DL (ref 0.2–1)
BUN SERPL-MCNC: 4 MG/DL (ref 6–20)
BUN/CREAT SERPL: 7 (ref 12–20)
CALCIUM SERPL-MCNC: 7.9 MG/DL (ref 8.5–10.1)
CHLORIDE SERPL-SCNC: 110 MMOL/L (ref 97–108)
CO2 SERPL-SCNC: 21 MMOL/L (ref 21–32)
CREAT SERPL-MCNC: 0.55 MG/DL (ref 0.55–1.02)
DIFFERENTIAL METHOD BLD: ABNORMAL
EOSINOPHIL # BLD: 0.9 K/UL (ref 0–0.4)
EOSINOPHIL NFR BLD: 8 % (ref 0–7)
ERYTHROCYTE [DISTWIDTH] IN BLOOD BY AUTOMATED COUNT: 20 % (ref 11.5–14.5)
EST. AVERAGE GLUCOSE BLD GHB EST-MCNC: NORMAL MG/DL
GLOBULIN SER CALC-MCNC: 4.3 G/DL (ref 2–4)
GLUCOSE BLD STRIP.AUTO-MCNC: 127 MG/DL (ref 65–100)
GLUCOSE BLD STRIP.AUTO-MCNC: 137 MG/DL (ref 65–100)
GLUCOSE BLD STRIP.AUTO-MCNC: 82 MG/DL (ref 65–100)
GLUCOSE BLD STRIP.AUTO-MCNC: 99 MG/DL (ref 65–100)
GLUCOSE SERPL-MCNC: 70 MG/DL (ref 65–100)
HBA1C MFR BLD: 4.7 % (ref 4.2–6.3)
HCT VFR BLD AUTO: 32.6 % (ref 35–47)
HGB BLD-MCNC: 9.8 G/DL (ref 11.5–16)
IMM GRANULOCYTES # BLD: 0.1 K/UL (ref 0–0.04)
IMM GRANULOCYTES NFR BLD AUTO: 1 % (ref 0–0.5)
LYMPHOCYTES # BLD: 0.7 K/UL (ref 0.8–3.5)
LYMPHOCYTES NFR BLD: 6 % (ref 12–49)
MAGNESIUM SERPL-MCNC: 1.7 MG/DL (ref 1.6–2.4)
MCH RBC QN AUTO: 23.9 PG (ref 26–34)
MCHC RBC AUTO-ENTMCNC: 30.1 G/DL (ref 30–36.5)
MCV RBC AUTO: 79.5 FL (ref 80–99)
MONOCYTES # BLD: 0.7 K/UL (ref 0–1)
MONOCYTES NFR BLD: 6 % (ref 5–13)
NEUTS SEG # BLD: 8.8 K/UL (ref 1.8–8)
NEUTS SEG NFR BLD: 79 % (ref 32–75)
NRBC # BLD: 0 K/UL (ref 0–0.01)
NRBC BLD-RTO: 0 PER 100 WBC
PHOSPHATE SERPL-MCNC: 1.5 MG/DL (ref 2.6–4.7)
PLATELET # BLD AUTO: 379 K/UL (ref 150–400)
PMV BLD AUTO: 12.6 FL (ref 8.9–12.9)
POTASSIUM SERPL-SCNC: 3.3 MMOL/L (ref 3.5–5.1)
PROT SERPL-MCNC: 6.8 G/DL (ref 6.4–8.2)
RBC # BLD AUTO: 4.1 M/UL (ref 3.8–5.2)
SERVICE CMNT-IMP: ABNORMAL
SERVICE CMNT-IMP: ABNORMAL
SERVICE CMNT-IMP: NORMAL
SERVICE CMNT-IMP: NORMAL
SODIUM SERPL-SCNC: 144 MMOL/L (ref 136–145)
TROPONIN I SERPL-MCNC: <0.05 NG/ML
TROPONIN I SERPL-MCNC: <0.05 NG/ML
TSH SERPL DL<=0.05 MIU/L-ACNC: 0.2 UIU/ML (ref 0.36–3.74)
VIT B12 SERPL-MCNC: 1069 PG/ML (ref 193–986)
WBC # BLD AUTO: 11.2 K/UL (ref 3.6–11)

## 2018-10-27 PROCEDURE — 74011250637 HC RX REV CODE- 250/637: Performed by: INTERNAL MEDICINE

## 2018-10-27 PROCEDURE — 83735 ASSAY OF MAGNESIUM: CPT | Performed by: INTERNAL MEDICINE

## 2018-10-27 PROCEDURE — 74011250636 HC RX REV CODE- 250/636: Performed by: HOSPITALIST

## 2018-10-27 PROCEDURE — 83036 HEMOGLOBIN GLYCOSYLATED A1C: CPT | Performed by: INTERNAL MEDICINE

## 2018-10-27 PROCEDURE — 82306 VITAMIN D 25 HYDROXY: CPT | Performed by: INTERNAL MEDICINE

## 2018-10-27 PROCEDURE — 82962 GLUCOSE BLOOD TEST: CPT

## 2018-10-27 PROCEDURE — 85025 COMPLETE CBC W/AUTO DIFF WBC: CPT | Performed by: INTERNAL MEDICINE

## 2018-10-27 PROCEDURE — 96366 THER/PROPH/DIAG IV INF ADDON: CPT

## 2018-10-27 PROCEDURE — 80053 COMPREHEN METABOLIC PANEL: CPT | Performed by: INTERNAL MEDICINE

## 2018-10-27 PROCEDURE — 99218 HC RM OBSERVATION: CPT

## 2018-10-27 PROCEDURE — 77030011943

## 2018-10-27 PROCEDURE — 96361 HYDRATE IV INFUSION ADD-ON: CPT

## 2018-10-27 PROCEDURE — 84443 ASSAY THYROID STIM HORMONE: CPT | Performed by: INTERNAL MEDICINE

## 2018-10-27 PROCEDURE — 74011250636 HC RX REV CODE- 250/636: Performed by: INTERNAL MEDICINE

## 2018-10-27 PROCEDURE — 74011250637 HC RX REV CODE- 250/637: Performed by: HOSPITALIST

## 2018-10-27 PROCEDURE — 82607 VITAMIN B-12: CPT | Performed by: INTERNAL MEDICINE

## 2018-10-27 PROCEDURE — 84100 ASSAY OF PHOSPHORUS: CPT | Performed by: INTERNAL MEDICINE

## 2018-10-27 PROCEDURE — 51798 US URINE CAPACITY MEASURE: CPT

## 2018-10-27 PROCEDURE — 36415 COLL VENOUS BLD VENIPUNCTURE: CPT | Performed by: INTERNAL MEDICINE

## 2018-10-27 PROCEDURE — 74011000250 HC RX REV CODE- 250: Performed by: INTERNAL MEDICINE

## 2018-10-27 PROCEDURE — 84484 ASSAY OF TROPONIN QUANT: CPT | Performed by: INTERNAL MEDICINE

## 2018-10-27 RX ORDER — POTASSIUM CHLORIDE 14.9 MG/ML
10 INJECTION INTRAVENOUS
Status: COMPLETED | OUTPATIENT
Start: 2018-10-27 | End: 2018-10-27

## 2018-10-27 RX ADMIN — ACETAMINOPHEN 650 MG: 325 TABLET ORAL at 08:07

## 2018-10-27 RX ADMIN — DIBASIC SODIUM PHOSPHATE, MONOBASIC POTASSIUM PHOSPHATE AND MONOBASIC SODIUM PHOSPHATE 1 TABLET: 852; 155; 130 TABLET ORAL at 17:20

## 2018-10-27 RX ADMIN — Medication 16 G: at 19:00

## 2018-10-27 RX ADMIN — VITAMIN D, TAB 1000IU (100/BT) 1000 UNITS: 25 TAB at 08:44

## 2018-10-27 RX ADMIN — LATANOPROST 1 DROP: 50 SOLUTION OPHTHALMIC at 21:46

## 2018-10-27 RX ADMIN — AMLODIPINE BESYLATE 5 MG: 5 TABLET ORAL at 08:44

## 2018-10-27 RX ADMIN — Medication 10 ML: at 20:58

## 2018-10-27 RX ADMIN — POTASSIUM CHLORIDE 10 MEQ: 200 INJECTION, SOLUTION INTRAVENOUS at 09:59

## 2018-10-27 RX ADMIN — POTASSIUM CHLORIDE 10 MEQ: 200 INJECTION, SOLUTION INTRAVENOUS at 08:46

## 2018-10-27 RX ADMIN — SODIUM CHLORIDE, SODIUM LACTATE, POTASSIUM CHLORIDE, CALCIUM CHLORIDE, AND DEXTROSE MONOHYDRATE 75 ML/HR: 600; 310; 30; 20; 5 INJECTION, SOLUTION INTRAVENOUS at 07:14

## 2018-10-27 NOTE — PROGRESS NOTES
CM noted patient transfer from the ED to 6e. Per previous CM [facility] [place]) notes patient lived at home PTA with her spouse. She had a fall in the home 3-4 days ago. Patient confirms demographics. PT has recommended snf however patient is in OBS status. CM met with patient to introduce self and role. Business card left at bedside. Patient was presented with OBS letter and signature obtained. Signed document placed on hard chart. Patient has copy of document at bedside. CM will continue to follow.   Eitan Xavier, CHARUW, CRM

## 2018-10-27 NOTE — PROGRESS NOTES
Problem: Pressure Injury - Risk of 
Goal: *Prevention of pressure injury Document Bon Scale and appropriate interventions in the flowsheet. Outcome: Progressing Towards Goal 
Pressure Injury Interventions: 
  
 
Moisture Interventions: Absorbent underpads, Apply protective barrier, creams and emollients, Minimize layers Activity Interventions: Increase time out of bed, Pressure redistribution bed/mattress(bed type) Mobility Interventions: HOB 30 degrees or less, Pressure redistribution bed/mattress (bed type), Turn and reposition approx. every two hours(pillow and wedges) Nutrition Interventions: Document food/fluid/supplement intake Friction and Shear Interventions: HOB 30 degrees or less, Lift sheet Comments: Oriented RA Mecdomenica soft BM pt doesn't know/ new admit 10/26PM 
 
Turn team 
 
RAC D5LR

## 2018-10-27 NOTE — PROGRESS NOTES
Hospitalist Progress Note Brea Camargo MD 
Answering service: [phone] OR [phone] from in house phone Cell: [phone] Date of Service:  10/27/2018 NAME:  Carmel Weber :  10/11/1932 MRN:  [id number] Admission Summary:  
Camrel Weber is a 80 y.o. female arthritis, HTN who was brought in by EMS to the ED from home s/p fall and inability to walk. Patient states she fell 3 days ago, was unable to get up,  would help her get up to a different spot. She states she has not eaten or had anything to drink since she fell 3 days ago. Her mouth is dry. She has not been passing urine or stools since she fell. She does not know how she fell, reports hitting her head, but not losing consciousness. She denies fever, nausea or vomiting, but complaining of CP, L upper abdominal pain, and chills currently. Interval history / Subjective: She said she has generalized weakness, no headache Assessment & Plan:  
 
Recurrent fall due to generalized weakness and ambulatory dysfunction  
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 
-continue normal saline 
-Vitamin B12 elevated -PT/OT 
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 
-report on CT head mass measuring 2.2 cm with primary considerations including aneurysm or pituitary adenoma. 
-not sure chronic 
-TSH low, will repeat TSH and free T4 
-She said she aware about the finding and she wants to follow up as an outpatient. She said she has appointment to see Dr Perla Baker at [address] [place] [address] 
Atypical chest pain likely due to musculoskeletal  
-troponin <0.05 
-ekg normal sinus vent rate 97 bpm left axis non specific st t wave 
-chest x ray no acute disease 
-SpO2 Hypoglycemia due to poor oral intake 
-A1c 4.7 
-Continue D5 ringer lactate, monitor finger stick glucose SIRS POA -leukocytosis improving, lactic acid 1.9, afebrile, 
-UA no evidence of infection Hypokalemia  
-replace with kcl and repeat k level HTN 
-continue norvasc, monitor BP Chronic severe eczema 
-has chronic eczema on hands, lower extremities and face 
-continue protective skin cream  
 
 
Code status: Full Code DVT prophylaxis: SCD Care Plan discussed with: Patient/Family and Nurse Disposition: SNF/LTC Hospital Problems  Date Reviewed: 10/26/2018 Codes Class Noted POA * (Principal) Generalized weakness ICD-10-CM: R53.1 ICD-9-CM: 780.79  10/26/2018 Unknown Hypoglycemia ICD-10-CM: E16.2 ICD-9-CM: 251.2  10/26/2018 Yes Hypokalemia ICD-10-CM: E87.6 ICD-9-CM: 276.8  10/26/2018 Yes SIRS (systemic inflammatory response syndrome) (HCC) ICD-10-CM: R65.10 ICD-9-CM: 995.90  10/26/2018 Yes Hypertension (Chronic) ICD-10-CM: I10 
ICD-9-CM: 401.9  10/26/2018 Yes Arthritis (Chronic) ICD-10-CM: M19.90 ICD-9-CM: 716.90  10/26/2018 Yes Vital Signs:  
 Last 24hrs VS reviewed since prior progress note. Most recent are: 
Visit Vitals /71 (BP 1 Location: Left arm, BP Patient Position: At rest) Pulse 81 Temp 99 °F (37.2 °C) Resp 18 SpO2 98% Intake/Output Summary (Last 24 hours) at 10/27/2018 8843 Last data filed at 10/27/2018 9614 Gross per 24 hour Intake 1789 ml Output  Net 1789 ml Physical Examination:  
 
 
     
Constitutional:  No acute distress, cooperative, pleasant   
ENT:  Oral mucous moist, oropharynx benign. Neck supple, Resp:  CTA bilaterally. No wheezing/rhonchi/rales. No accessory muscle use CV:  Regular rhythm, normal rate, no murmurs, gallops, rubs GI:  Soft, non distended, non tender. normoactive bowel sounds, no hepatosplenomegaly Musculoskeletal:  No edema,  Neurologic:  Conscious and alert, well oriented, motor UE 5/5, LE 4/5  
 Skin:  Hyperpigmented, scaly eczematous skin all over includidng face Data Review:  
 Review and/or order of clinical lab test 
 
 
Labs:  
 
Recent Labs 10/27/18 
0248 10/26/18 
1204 WBC 11.2* 14.9* HGB 9.8* 11.8 HCT 32.6* 39.8  415* Recent Labs 10/27/18 
0248 10/26/18 
1204  140  
K 3.3* 2.8*  
* 110* CO2 21 19* BUN 4* 6  
CREA 0.55 0.78 GLU 70 61* CA 7.9* 8.9 MG 1.7 2.0 PHOS 1.5*  --   
 
Recent Labs 10/27/18 
0248 10/26/18 
1204 SGOT 52* 67* ALT 21 25 AP 45 55 TBILI 0.9 1.4* TP 6.8 8.7* ALB 2.5* 3.3*  
GLOB 4.3* 5.4* No results for input(s): INR, PTP, APTT in the last 72 hours. No lab exists for component: INREXT No results for input(s): FE, TIBC, PSAT, FERR in the last 72 hours. No results found for: FOL, RBCF No results for input(s): PH, PCO2, PO2 in the last 72 hours. Recent Labs 10/27/18 
0248 10/26/18 
2033 10/26/18 
1204 TROIQ <0.05 <0.05 <0.05 No results found for: CHOL, CHOLX, CHLST, CHOLV, HDL, LDL, LDLC, DLDLP, TGLX, TRIGL, TRIGP, CHHD, CHHDX Lab Results Component Value Date/Time Glucose (POC) 82 10/27/2018 07:18 AM  
 Glucose (POC) 91 10/26/2018 09:19 PM  
 Glucose (POC) 132 (H) 10/26/2018 12:41 PM  
 Glucose (POC) 57 (L) 10/26/2018 11:48 AM  
 
Lab Results Component Value Date/Time Color DARK YELLOW 10/26/2018 12:04 PM  
 Appearance CLEAR 10/26/2018 12:04 PM  
 Specific gravity 1.021 10/26/2018 12:04 PM  
 pH (UA) 6.0 10/26/2018 12:04 PM  
 Protein 30 (A) 10/26/2018 12:04 PM  
 Glucose NEGATIVE  10/26/2018 12:04 PM  
 Ketone 80 (A) 10/26/2018 12:04 PM  
 Urobilinogen 1.0 10/26/2018 12:04 PM  
 Nitrites NEGATIVE  10/26/2018 12:04 PM  
 Leukocyte Esterase NEGATIVE  10/26/2018 12:04 PM  
 Epithelial cells FEW 10/26/2018 12:04 PM  
 Bacteria NEGATIVE  10/26/2018 12:04 PM  
 WBC 0-4 10/26/2018 12:04 PM  
 RBC 0-5 10/26/2018 12:04 PM  
 
 
 
Medications Reviewed: Current Facility-Administered Medications Medication Dose Route Frequency  potassium chloride 10 mEq in 50 ml IVPB  10 mEq IntraVENous Q1H  
 amLODIPine (NORVASC) tablet 5 mg  5 mg Oral DAILY  cholecalciferol (VITAMIN D3) tablet 1,000 Units  1,000 Units Oral DAILY  latanoprost (XALATAN) 0.005 % ophthalmic solution 1 Drop  1 Drop Both Eyes QHS  sodium chloride (NS) flush 5-10 mL  5-10 mL IntraVENous Q8H  
 sodium chloride (NS) flush 5-10 mL  5-10 mL IntraVENous PRN  
 ondansetron (ZOFRAN) injection 4 mg  4 mg IntraVENous Q4H PRN  
 acetaminophen (TYLENOL) tablet 650 mg  650 mg Oral Q4H PRN  
 glucose chewable tablet 16 g  4 Tab Oral PRN  
 dextrose (D50W) injection syrg 12.5-25 g  25-50 mL IntraVENous PRN  
 glucagon (GLUCAGEN) injection 1 mg  1 mg IntraMUSCular PRN  
 insulin lispro (HUMALOG) injection   SubCUTAneous AC&HS  
 dextrose 5% lactated ringers infusion  75 mL/hr IntraVENous CONTINUOUS  
 
______________________________________________________________________ EXPECTED LENGTH OF STAY: - - - 
ACTUAL LENGTH OF STAY:          0 Jorge Lubin MD

## 2018-10-27 NOTE — PROGRESS NOTES
Problem: Pressure Injury - Risk of 
Goal: *Prevention of pressure injury Document Bon Scale and appropriate interventions in the flowsheet. Outcome: Progressing Towards Goal 
Pressure Injury Interventions: 
  
 
Moisture Interventions: Absorbent underpads Activity Interventions: Increase time out of bed Mobility Interventions: HOB 30 degrees or less Nutrition Interventions: Document food/fluid/supplement intake Friction and Shear Interventions: Lift team/patient mobility team, Lift sheet

## 2018-10-27 NOTE — PROGRESS NOTES
Orders received and chart reviewed. Pt was evaluated on 10/26 (yesterday) while in the ED. PT at that time recommened that pt will likely need SNF placement after discharge. Will follow up on Monday as appropriate.

## 2018-10-27 NOTE — ED PROVIDER NOTES
Chloe Gipson is a 80 y.o. female arthritis, HTN who was brought in by EMS to the ED from home s/p fall and inability to walk. Patient states she fell 3 days ago, was unable to get up,  would help her get up to a different spot. She states she has not eaten or had anything to drink since she fell 3 days ago. Her mouth is dry. She has not been passing urine or stools since she fell. She does not know how she fell, reports hitting her head, but not losing consciousness. She denies fever, nausea or vomiting, but complaining of CP, L upper abdominal pain, and chills currently. Past Medical History:  
Diagnosis Date  Arthritis  Hypertension Past Surgical History:  
Procedure Laterality Date  HX HYSTERECTOMY  HX TONSILLECTOMY History reviewed. No pertinent family history. Social History Socioeconomic History  Marital status:  Spouse name: Not on file  Number of children: Not on file  Years of education: Not on file  Highest education level: Not on file Social Needs  Financial resource strain: Not on file  Food insecurity - worry: Not on file  Food insecurity - inability: Not on file  Transportation needs - medical: Not on file  Transportation needs - non-medical: Not on file Occupational History  Not on file Tobacco Use  Smoking status: Never Smoker  Smokeless tobacco: Never Used Substance and Sexual Activity  Alcohol use: No  
  Frequency: Never  Drug use: No  
 Sexual activity: Not on file Other Topics Concern  Not on file Social History Narrative  Not on file ALLERGIES: Latex; Erythromycin; Neosporin [neomycin-bacitracnzn-polymyxnb]; and Penicillins Review of Systems Constitutional: Positive for activity change and fatigue. Negative for diaphoresis and fever. HENT: Negative for congestion and sore throat. Eyes: Negative for photophobia and visual disturbance. Respiratory: Negative for chest tightness and shortness of breath. Cardiovascular: Negative for chest pain, palpitations and leg swelling. Gastrointestinal: Positive for diarrhea, nausea and vomiting. Negative for abdominal pain, blood in stool and constipation. Genitourinary: Negative for difficulty urinating, dysuria, flank pain, frequency and hematuria. Musculoskeletal: Negative for back pain. Neurological: Negative for dizziness, syncope, numbness and headaches. All other systems reviewed and are negative. Vitals:  
 10/26/18 2111 10/27/18 0305 10/27/18 0819 10/27/18 1436 BP: 139/68 126/67 132/71 112/57 Pulse: 78 90 81 85 Resp: 22 24 18 16 Temp: 98 °F (36.7 °C) 99.8 °F (37.7 °C) 99 °F (37.2 °C) 99 °F (37.2 °C) SpO2: 99% 100% 98% 99% Physical Exam  
Constitutional: She is oriented to person, place, and time. She appears well-developed. She appears ill. No distress. HENT:  
Head: Normocephalic and atraumatic. Nose: Nose normal.  
Mouth/Throat: Oropharynx is clear and moist. No oropharyngeal exudate. Eyes: Conjunctivae and EOM are normal. Pupils are equal, round, and reactive to light. Right eye exhibits no discharge. Left eye exhibits no discharge. No scleral icterus. Neck: Normal range of motion. Neck supple. No JVD present. No tracheal deviation present. No thyromegaly present. Cardiovascular: Normal rate, regular rhythm, normal heart sounds and intact distal pulses. Exam reveals no gallop and no friction rub. No murmur heard. Pulmonary/Chest: Effort normal and breath sounds normal. No stridor. No respiratory distress. She has no wheezes. She has no rales. She exhibits no tenderness. Abdominal: Bowel sounds are normal. She exhibits no distension and no mass. There is no tenderness. There is no rebound. Musculoskeletal: Normal range of motion. She exhibits no edema or tenderness. Lymphadenopathy:  
  She has no cervical adenopathy. Neurological: She is alert and oriented to person, place, and time. No cranial nerve deficit. Coordination normal.  
Skin: Skin is warm and dry. No rash noted. She is not diaphoretic. No erythema. No pallor. Psychiatric: She has a normal mood and affect. Her behavior is normal. Judgment and thought content normal.  
Nursing note and vitals reviewed. MDM Number of Diagnoses or Management Options Fall, initial encounter: Hypokalemia:  
  
Amount and/or Complexity of Data Reviewed Clinical lab tests: ordered and reviewed Tests in the radiology section of CPT®: reviewed and ordered Review and summarize past medical records: yes Discuss the patient with other providers: yes Independent visualization of images, tracings, or specimens: yes Risk of Complications, Morbidity, and/or Mortality Presenting problems: moderate Diagnostic procedures: moderate Management options: moderate Patient Progress Patient progress: improved Procedures 4:39 PM 
Patient is being admitted to the hospital.  The results of their tests and reasons for their admission have been discussed with them and/or available family. They convey agreement and understanding for the need to be admitted and for their admission diagnosis. Consultation has been made with the inpatient physician specialist for hospitalization. LABORATORY TESTS: 
Recent Results (from the past 12 hour(s)) GLUCOSE, POC Collection Time: 10/27/18  7:18 AM  
Result Value Ref Range Glucose (POC) 82 65 - 100 mg/dL Performed by Desire Kaur TROPONIN I Collection Time: 10/27/18  8:36 AM  
Result Value Ref Range Troponin-I, Qt. <0.05 <0.05 ng/mL GLUCOSE, POC Collection Time: 10/27/18 11:57 AM  
Result Value Ref Range Glucose (POC) 99 65 - 100 mg/dL Performed by Aishwarya Carty GLUCOSE, POC Collection Time: 10/27/18  4:18 PM  
Result Value Ref Range Glucose (POC) 137 (H) 65 - 100 mg/dL Performed by Jesus Alberto Bones IMAGING RESULTS: 
CT HEAD WO CONT Final Result XR CHEST PORT Final Result No results found. MEDICATIONS GIVEN: 
Medications  
amLODIPine (NORVASC) tablet 5 mg (5 mg Oral Given 10/27/18 0844) cholecalciferol (VITAMIN D3) tablet 1,000 Units (1,000 Units Oral Given 10/27/18 0844) latanoprost (XALATAN) 0.005 % ophthalmic solution 1 Drop (0 Drops Both Eyes Held 10/27/18 0159)  
sodium chloride (NS) flush 5-10 mL (0 mL IntraVENous Held 10/27/18 1400)  
sodium chloride (NS) flush 5-10 mL (not administered)  
ondansetron (ZOFRAN) injection 4 mg (not administered)  
acetaminophen (TYLENOL) tablet 650 mg (650 mg Oral Given 10/27/18 0807) glucose chewable tablet 16 g (not administered) dextrose (D50W) injection syrg 12.5-25 g (not administered) glucagon (GLUCAGEN) injection 1 mg (not administered)  
insulin lispro (HUMALOG) injection (0 Units SubCUTAneous Held 10/27/18 1130) dextrose 5% lactated ringers infusion (75 mL/hr IntraVENous New Bag 10/27/18 0714) phosphorus (K PHOS NEUTRAL) 250 mg tablet 1 Tab (not administered) dextrose 10 % infusion 250 mL (0 mL IntraVENous IV Completed 10/26/18 1238) potassium chloride 10 mEq in 100 ml IVPB (0 mEq IntraVENous IV Completed 10/26/18 1600)  
sodium chloride 0.9 % bolus infusion 1,000 mL (0 mL IntraVENous IV Completed 10/26/18 1622) potassium chloride (KAON 10%) 20 mEq/15 mL oral liquid 20 mEq (20 mEq Oral Given 10/26/18 1445) potassium chloride SR (KLOR-CON 10) tablet 40 mEq (40 mEq Oral Given 10/26/18 1826) potassium chloride 10 mEq in 100 ml IVPB (10 mEq IntraVENous New Bag 10/26/18 2217) potassium chloride 10 mEq in 50 ml IVPB (10 mEq IntraVENous New Bag 10/27/18 0959) IMPRESSION: 
1. Hypokalemia 2. Fall, initial encounter PLAN: 
1.  Admit to Aniyah Santos MD

## 2018-10-27 NOTE — PROGRESS NOTES
Attempted lab draw x 2. Unsuccessful. Requested April HUDSON to attempt. Reports yes. [facility] attempted x 1. Unsuccessful. Advises to call for RT to do arterial draw [address] [place] [address] service to [facility] 
 
Shukri Steele RN to attempt

## 2018-10-27 NOTE — PROGRESS NOTES
Bedside and Verbal shift change report given to Jacob Landau (oncoming nurse) by Philip Landis (offgoing nurse). Report included the following information SBAR, MAR and Recent Results.

## 2018-10-27 NOTE — PROGRESS NOTES
Problem: Falls - Risk of 
Goal: *Absence of Falls Document Fareed Shadow Fall Risk and appropriate interventions in the flowsheet. Outcome: Progressing Towards Goal 
Fall Risk Interventions: 
Mobility Interventions: Patient to call before getting OOB Medication Interventions: Evaluate medications/consider consulting pharmacy Elimination Interventions: Patient to call for help with toileting needs History of Falls Interventions: Door open when patient unattended

## 2018-10-28 LAB
ALBUMIN SERPL-MCNC: 2.4 G/DL (ref 3.5–5)
ALBUMIN/GLOB SERPL: 0.6 {RATIO} (ref 1.1–2.2)
ALP SERPL-CCNC: 42 U/L (ref 45–117)
ALT SERPL-CCNC: 18 U/L (ref 12–78)
ANION GAP SERPL CALC-SCNC: 10 MMOL/L (ref 5–15)
AST SERPL-CCNC: 35 U/L (ref 15–37)
BILIRUB SERPL-MCNC: 0.6 MG/DL (ref 0.2–1)
BUN SERPL-MCNC: 4 MG/DL (ref 6–20)
BUN/CREAT SERPL: 9 (ref 12–20)
CALCIUM SERPL-MCNC: 7.9 MG/DL (ref 8.5–10.1)
CHLORIDE SERPL-SCNC: 109 MMOL/L (ref 97–108)
CO2 SERPL-SCNC: 26 MMOL/L (ref 21–32)
CREAT SERPL-MCNC: 0.44 MG/DL (ref 0.55–1.02)
ERYTHROCYTE [DISTWIDTH] IN BLOOD BY AUTOMATED COUNT: 20.1 % (ref 11.5–14.5)
GLOBULIN SER CALC-MCNC: 3.8 G/DL (ref 2–4)
GLUCOSE BLD STRIP.AUTO-MCNC: 102 MG/DL (ref 65–100)
GLUCOSE BLD STRIP.AUTO-MCNC: 75 MG/DL (ref 65–100)
GLUCOSE BLD STRIP.AUTO-MCNC: 78 MG/DL (ref 65–100)
GLUCOSE BLD STRIP.AUTO-MCNC: 85 MG/DL (ref 65–100)
GLUCOSE BLD STRIP.AUTO-MCNC: 89 MG/DL (ref 65–100)
GLUCOSE BLD STRIP.AUTO-MCNC: 97 MG/DL (ref 65–100)
GLUCOSE SERPL-MCNC: 78 MG/DL (ref 65–100)
HCT VFR BLD AUTO: 29.5 % (ref 35–47)
HGB BLD-MCNC: 9 G/DL (ref 11.5–16)
MAGNESIUM SERPL-MCNC: 1.7 MG/DL (ref 1.6–2.4)
MCH RBC QN AUTO: 23.9 PG (ref 26–34)
MCHC RBC AUTO-ENTMCNC: 30.5 G/DL (ref 30–36.5)
MCV RBC AUTO: 78.5 FL (ref 80–99)
NRBC # BLD: 0 K/UL (ref 0–0.01)
NRBC BLD-RTO: 0 PER 100 WBC
PHOSPHATE SERPL-MCNC: 2.2 MG/DL (ref 2.6–4.7)
PLATELET # BLD AUTO: 326 K/UL (ref 150–400)
PMV BLD AUTO: 11.3 FL (ref 8.9–12.9)
POTASSIUM SERPL-SCNC: 3 MMOL/L (ref 3.5–5.1)
PROT SERPL-MCNC: 6.2 G/DL (ref 6.4–8.2)
RBC # BLD AUTO: 3.76 M/UL (ref 3.8–5.2)
SERVICE CMNT-IMP: ABNORMAL
SERVICE CMNT-IMP: NORMAL
SODIUM SERPL-SCNC: 145 MMOL/L (ref 136–145)
WBC # BLD AUTO: 9.3 K/UL (ref 3.6–11)

## 2018-10-28 PROCEDURE — 83735 ASSAY OF MAGNESIUM: CPT | Performed by: HOSPITALIST

## 2018-10-28 PROCEDURE — 85027 COMPLETE CBC AUTOMATED: CPT | Performed by: HOSPITALIST

## 2018-10-28 PROCEDURE — 74011250637 HC RX REV CODE- 250/637: Performed by: INTERNAL MEDICINE

## 2018-10-28 PROCEDURE — 82962 GLUCOSE BLOOD TEST: CPT

## 2018-10-28 PROCEDURE — 74011250637 HC RX REV CODE- 250/637: Performed by: HOSPITALIST

## 2018-10-28 PROCEDURE — 36415 COLL VENOUS BLD VENIPUNCTURE: CPT | Performed by: HOSPITALIST

## 2018-10-28 PROCEDURE — 99218 HC RM OBSERVATION: CPT

## 2018-10-28 PROCEDURE — 84100 ASSAY OF PHOSPHORUS: CPT | Performed by: HOSPITALIST

## 2018-10-28 PROCEDURE — 80053 COMPREHEN METABOLIC PANEL: CPT | Performed by: HOSPITALIST

## 2018-10-28 RX ORDER — SODIUM,POTASSIUM PHOSPHATES 280-250MG
2 POWDER IN PACKET (EA) ORAL 4 TIMES DAILY
Status: DISPENSED | OUTPATIENT
Start: 2018-10-28 | End: 2018-10-29

## 2018-10-28 RX ADMIN — POTASSIUM & SODIUM PHOSPHATES POWDER PACK 280-160-250 MG 2 PACKET: 280-160-250 PACK at 21:07

## 2018-10-28 RX ADMIN — POTASSIUM & SODIUM PHOSPHATES POWDER PACK 280-160-250 MG 2 PACKET: 280-160-250 PACK at 18:03

## 2018-10-28 RX ADMIN — Medication 10 ML: at 21:07

## 2018-10-28 RX ADMIN — VITAMIN D, TAB 1000IU (100/BT) 1000 UNITS: 25 TAB at 09:28

## 2018-10-28 RX ADMIN — POTASSIUM & SODIUM PHOSPHATES POWDER PACK 280-160-250 MG 2 PACKET: 280-160-250 PACK at 12:42

## 2018-10-28 RX ADMIN — AMLODIPINE BESYLATE 5 MG: 5 TABLET ORAL at 09:28

## 2018-10-28 RX ADMIN — DIBASIC SODIUM PHOSPHATE, MONOBASIC POTASSIUM PHOSPHATE AND MONOBASIC SODIUM PHOSPHATE 1 TABLET: 852; 155; 130 TABLET ORAL at 09:35

## 2018-10-28 RX ADMIN — Medication 10 ML: at 14:31

## 2018-10-28 NOTE — PROGRESS NOTES
Problem: Falls - Risk of 
Goal: *Absence of Falls Document Juanjo Messina Fall Risk and appropriate interventions in the flowsheet. Outcome: Progressing Towards Goal 
Fall Risk Interventions: 
Mobility Interventions: Patient to call before getting OOB Medication Interventions: Evaluate medications/consider consulting pharmacy Elimination Interventions: Bed/chair exit alarm, Call light in reach History of Falls Interventions: Door open when patient unattended

## 2018-10-28 NOTE — PROGRESS NOTES
Hospitalist Progress Note Mp Montana MD 
Answering service: [phone] OR [phone] from in house phone Cell: [phone] Date of Service:  10/28/2018 NAME:  Tierra Roberto :  10/11/1932 MRN:  [id number] Admission Summary:  
Tierra Roberto is a 80 y.o. female arthritis, HTN who was brought in by EMS to the ED from home s/p fall and inability to walk. Patient states she fell 3 days ago, was unable to get up,  would help her get up to a different spot. She states she has not eaten or had anything to drink since she fell 3 days ago. Her mouth is dry. She has not been passing urine or stools since she fell. She does not know how she fell, reports hitting her head, but not losing consciousness. She denies fever, nausea or vomiting, but complaining of CP, L upper abdominal pain, and chills currently. Interval history / Subjective: She said she feels the same, no headache Assessment & Plan:  
 
Recurrent fall due to generalized weakness and ambulatory dysfunction  
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 
-continue normal saline 
-Vitamin B12 elevated -PT/OT 
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 
-report on CT head mass measuring 2.2 cm with primary considerations including aneurysm or pituitary adenoma. 
-not sure chronic 
-TSH low, will repeat TSH and free T4 
-She said she aware about the finding and she wants to follow up as an outpatient. She said she has appointment to see Dr Ritu Guerrero at [address] [facility] 
Atypical chest pain likely due to musculoskeletal  
-troponin <0.05 
-ekg normal sinus vent rate 97 bpm left axis non specific st t wave 
-chest x ray no acute disease 
-SpO2 % on RA Hypoglycemia due to poor oral intake 
-A1c 4.7 
-Continue D5 ringer lactate, monitor finger stick glucose SIRS POA -leukocytosis improving, lactic acid 1.9, afebrile, 
-leukocytosis resolved 
-UA no evidence of infection Hypokalemia/Hypophosphatemia  
-replace with k phos, repeat in am 
 
HTN 
-continue norvasc, monitor BP Chronic severe eczema 
-has chronic eczema on hands, lower extremities and face 
-continue protective skin cream  
 
 
Code status: Full Code DVT prophylaxis: SCD Care Plan discussed with: Patient/Family and Nurse Disposition: SNF/LTC Hospital Problems  Date Reviewed: 10/26/2018 Codes Class Noted POA * (Principal) Generalized weakness ICD-10-CM: R53.1 ICD-9-CM: 780.79  10/26/2018 Unknown Hypoglycemia ICD-10-CM: E16.2 ICD-9-CM: 251.2  10/26/2018 Yes Hypokalemia ICD-10-CM: E87.6 ICD-9-CM: 276.8  10/26/2018 Yes SIRS (systemic inflammatory response syndrome) (HCC) ICD-10-CM: R65.10 ICD-9-CM: 995.90  10/26/2018 Yes Hypertension (Chronic) ICD-10-CM: I10 
ICD-9-CM: 401.9  10/26/2018 Yes Arthritis (Chronic) ICD-10-CM: M19.90 ICD-9-CM: 716.90  10/26/2018 Yes Vital Signs:  
 Last 24hrs VS reviewed since prior progress note. Most recent are: 
Visit Vitals /60 (BP 1 Location: Left arm, BP Patient Position: At rest) Pulse 84 Temp 98.5 °F (36.9 °C) Resp 18 SpO2 96% Intake/Output Summary (Last 24 hours) at 10/28/2018 3734 Last data filed at 10/27/2018 2235 Gross per 24 hour Intake 998 ml Output 525 ml Net 473 ml Physical Examination:  
 
 
     
Constitutional:  No acute distress, cooperative, pleasant   
ENT:  Oral mucous moist, oropharynx benign. Neck supple, Resp:  CTA bilaterally. No wheezing/rhonchi/rales. No accessory muscle use CV:  Regular rhythm, normal rate, no murmurs, gallops, rubs GI:  Soft, non distended, non tender. normoactive bowel sounds, no hepatosplenomegaly Musculoskeletal:  No edema,  Neurologic:  Conscious and alert, well oriented, motor UE 5/5, LE 4/5  
 Skin:  Hyperpigmented, scaly eczematous skin all over includidng face Data Review:  
 Review and/or order of clinical lab test 
 
 
Labs:  
 
Recent Labs 10/28/18 
8378 10/27/18 
8990 WBC 9.3 11.2* HGB 9.0* 9.8* HCT 29.5* 32.6*  
 379 Recent Labs 10/28/18 
0542 10/27/18 
0248 10/26/18 
1204  144 140  
K 3.0* 3.3* 2.8*  
* 110* 110* CO2 26 21 19* BUN 4* 4* 6  
CREA 0.44* 0.55 0.78 GLU 78 70 61* CA 7.9* 7.9* 8.9 MG 1.7 1.7 2.0 PHOS 2.2* 1.5*  --   
 
Recent Labs 10/28/18 
0542 10/27/18 
0248 10/26/18 
1204 SGOT 35 52* 67* ALT 18 21 25 AP 42* 45 55 TBILI 0.6 0.9 1.4* TP 6.2* 6.8 8.7* ALB 2.4* 2.5* 3.3*  
GLOB 3.8 4.3* 5.4* No results for input(s): INR, PTP, APTT in the last 72 hours. No lab exists for component: INREXT, INREXT No results for input(s): FE, TIBC, PSAT, FERR in the last 72 hours. No results found for: FOL, RBCF No results for input(s): PH, PCO2, PO2 in the last 72 hours. Recent Labs 10/27/18 
0433 10/27/18 
0248 10/26/18 
2033 TROIQ <0.05 <0.05 <0.05 No results found for: CHOL, CHOLX, CHLST, CHOLV, HDL, LDL, LDLC, DLDLP, TGLX, TRIGL, TRIGP, CHHD, CHHDX Lab Results Component Value Date/Time Glucose (POC) 97 10/28/2018 07:08 AM  
 Glucose (POC) 78 10/28/2018 06:14 AM  
 Glucose (POC) 127 (H) 10/27/2018 09:25 PM  
 Glucose (POC) 137 (H) 10/27/2018 04:18 PM  
 Glucose (POC) 99 10/27/2018 11:57 AM  
 
Lab Results Component Value Date/Time  Color DARK YELLOW 10/26/2018 12:04 PM  
 Appearance CLEAR 10/26/2018 12:04 PM  
 Specific gravity 1.021 10/26/2018 12:04 PM  
 pH (UA) 6.0 10/26/2018 12:04 PM  
 Protein 30 (A) 10/26/2018 12:04 PM  
 Glucose NEGATIVE  10/26/2018 12:04 PM  
 Ketone 80 (A) 10/26/2018 12:04 PM  
 Urobilinogen 1.0 10/26/2018 12:04 PM  
 Nitrites NEGATIVE  10/26/2018 12:04 PM  
 Leukocyte Esterase NEGATIVE  10/26/2018 12:04 PM  
 Epithelial cells FEW 10/26/2018 12:04 PM  
 Bacteria NEGATIVE  10/26/2018 12:04 PM  
 WBC 0-4 10/26/2018 12:04 PM  
 RBC 0-5 10/26/2018 12:04 PM  
 
 
 
Medications Reviewed:  
 
Current Facility-Administered Medications Medication Dose Route Frequency  potassium, sodium phosphates (NEUTRA-PHOS) packet 2 Packet  2 Packet Oral QID  phosphorus (K PHOS NEUTRAL) 250 mg tablet 1 Tab  1 Tab Oral TID  amLODIPine (NORVASC) tablet 5 mg  5 mg Oral DAILY  cholecalciferol (VITAMIN D3) tablet 1,000 Units  1,000 Units Oral DAILY  latanoprost (XALATAN) 0.005 % ophthalmic solution 1 Drop  1 Drop Both Eyes QHS  sodium chloride (NS) flush 5-10 mL  5-10 mL IntraVENous Q8H  
 sodium chloride (NS) flush 5-10 mL  5-10 mL IntraVENous PRN  
 ondansetron (ZOFRAN) injection 4 mg  4 mg IntraVENous Q4H PRN  
 acetaminophen (TYLENOL) tablet 650 mg  650 mg Oral Q4H PRN  
 glucose chewable tablet 16 g  4 Tab Oral PRN  
 dextrose (D50W) injection syrg 12.5-25 g  25-50 mL IntraVENous PRN  
 glucagon (GLUCAGEN) injection 1 mg  1 mg IntraMUSCular PRN  
 insulin lispro (HUMALOG) injection   SubCUTAneous AC&HS  
 
______________________________________________________________________ EXPECTED LENGTH OF STAY: - - - 
ACTUAL LENGTH OF STAY:          0 Malorie Nolan MD

## 2018-10-28 NOTE — PROGRESS NOTES
Problem: Pressure Injury - Risk of 
Goal: *Prevention of pressure injury Document Bon Scale and appropriate interventions in the flowsheet. Outcome: Progressing Towards Goal 
Pressure Injury Interventions: 
Sensory Interventions: Keep linens dry and wrinkle-free, Pressure redistribution bed/mattress (bed type) Moisture Interventions: Absorbent underpads, Minimize layers Activity Interventions: Increase time out of bed, Pressure redistribution bed/mattress(bed type) Mobility Interventions: HOB 30 degrees or less, Pressure redistribution bed/mattress (bed type), Turn and reposition approx. every two hours(pillow and wedges) Nutrition Interventions: Document food/fluid/supplement intake Friction and Shear Interventions: Apply protective barrier, creams and emollients, HOB 30 degrees or less, Lift sheet Problem: Falls - Risk of 
Goal: *Absence of Falls Document Delilah Maciel Fall Risk and appropriate interventions in the flowsheet. Outcome: Progressing Towards Goal 
Fall Risk Interventions: 
Mobility Interventions: Patient to call before getting OOB, Strengthening exercises (ROM-active/passive) Medication Interventions: Patient to call before getting OOB, Evaluate medications/consider consulting pharmacy, Teach patient to arise slowly Elimination Interventions: Call light in reach, Patient to call for help with toileting needs History of Falls Interventions: Door open when patient unattended, Room close to nurse's station Comments: Oriented x 4 
 
RA Mech soft/ distended belly BM 10/25 per pt normal/ BSCx2 
 
@1835 - urine st. Cath 525 PT/OT/walker turn team 
 
Dried idania feet/ idania hands leathery IV infiltrated/ IVF d/c'd

## 2018-10-28 NOTE — PROGRESS NOTES
Bedside shift change report given to [address] (oncoming nurse) by Josh Melara (offgoing nurse). Report included the following information Summary of patient history/assessment/shift

## 2018-10-28 NOTE — PROGRESS NOTES
2059 - Attempted to flush IV, found infiltrated. 2251 - Asked pt if I could place new IV. Pt reports, \"not tonight, I'm fine for tonight. \" Pt does not have IV meds for HS shift. Asked if I could do it during labs. Pt reports, \" that would be better to do later in the night. \" 
 
Clinton Gaucher RN instructed to see if 5E had vein finder. No response with call to 5E Attempted IV x 2. Unsuccessful. Will call IV team. 
 
2722 - Called. No answer. Left message 2230 - Asked Kaylan De Anda RN to attempt. Reports she will with lab draws [facility] RN attempted x 3. Unsuccessful. Gabriella Kim RN to attempt. 
 
Shai Benoit x 1 unsuccessful. [address]  PICC team called. Janit Lung aware that pt has no IV access. Requested RN to ask MD if patient needs one. Reported off to [address] [place] [address] at [phone]

## 2018-10-28 NOTE — PROGRESS NOTES
Bedside and Verbal shift change report given to Arnaldo Walker (oncoming nurse) by Sonny Leos (offgoing nurse). Report included the following information SBAR, MAR and Recent Results.

## 2018-10-29 LAB
ALBUMIN SERPL-MCNC: 2.2 G/DL (ref 3.5–5)
ALBUMIN/GLOB SERPL: 0.5 {RATIO} (ref 1.1–2.2)
ALP SERPL-CCNC: 43 U/L (ref 45–117)
ALT SERPL-CCNC: 16 U/L (ref 12–78)
ANION GAP SERPL CALC-SCNC: 8 MMOL/L (ref 5–15)
AST SERPL-CCNC: 30 U/L (ref 15–37)
BILIRUB SERPL-MCNC: 0.7 MG/DL (ref 0.2–1)
BUN SERPL-MCNC: 4 MG/DL (ref 6–20)
BUN/CREAT SERPL: 9 (ref 12–20)
CALCIUM SERPL-MCNC: 7.7 MG/DL (ref 8.5–10.1)
CHLORIDE SERPL-SCNC: 107 MMOL/L (ref 97–108)
CO2 SERPL-SCNC: 27 MMOL/L (ref 21–32)
CREAT SERPL-MCNC: 0.46 MG/DL (ref 0.55–1.02)
ERYTHROCYTE [DISTWIDTH] IN BLOOD BY AUTOMATED COUNT: 20.3 % (ref 11.5–14.5)
GLOBULIN SER CALC-MCNC: 4.1 G/DL (ref 2–4)
GLUCOSE BLD STRIP.AUTO-MCNC: 102 MG/DL (ref 65–100)
GLUCOSE BLD STRIP.AUTO-MCNC: 118 MG/DL (ref 65–100)
GLUCOSE BLD STRIP.AUTO-MCNC: 71 MG/DL (ref 65–100)
GLUCOSE BLD STRIP.AUTO-MCNC: 75 MG/DL (ref 65–100)
GLUCOSE BLD STRIP.AUTO-MCNC: 78 MG/DL (ref 65–100)
GLUCOSE BLD STRIP.AUTO-MCNC: 88 MG/DL (ref 65–100)
GLUCOSE BLD STRIP.AUTO-MCNC: 92 MG/DL (ref 65–100)
GLUCOSE SERPL-MCNC: 67 MG/DL (ref 65–100)
HCT VFR BLD AUTO: 30.6 % (ref 35–47)
HGB BLD-MCNC: 9.2 G/DL (ref 11.5–16)
MAGNESIUM SERPL-MCNC: 1.7 MG/DL (ref 1.6–2.4)
MCH RBC QN AUTO: 23.7 PG (ref 26–34)
MCHC RBC AUTO-ENTMCNC: 30.1 G/DL (ref 30–36.5)
MCV RBC AUTO: 78.9 FL (ref 80–99)
NRBC # BLD: 0 K/UL (ref 0–0.01)
NRBC BLD-RTO: 0 PER 100 WBC
PHOSPHATE SERPL-MCNC: 3.4 MG/DL (ref 2.6–4.7)
PLATELET # BLD AUTO: 318 K/UL (ref 150–400)
PMV BLD AUTO: 11.4 FL (ref 8.9–12.9)
POTASSIUM SERPL-SCNC: 3.9 MMOL/L (ref 3.5–5.1)
PROT SERPL-MCNC: 6.3 G/DL (ref 6.4–8.2)
RBC # BLD AUTO: 3.88 M/UL (ref 3.8–5.2)
SERVICE CMNT-IMP: ABNORMAL
SERVICE CMNT-IMP: ABNORMAL
SERVICE CMNT-IMP: NORMAL
SODIUM SERPL-SCNC: 142 MMOL/L (ref 136–145)
WBC # BLD AUTO: 7.1 K/UL (ref 3.6–11)

## 2018-10-29 PROCEDURE — 82962 GLUCOSE BLOOD TEST: CPT

## 2018-10-29 PROCEDURE — 36415 COLL VENOUS BLD VENIPUNCTURE: CPT | Performed by: HOSPITALIST

## 2018-10-29 PROCEDURE — 74011250637 HC RX REV CODE- 250/637: Performed by: INTERNAL MEDICINE

## 2018-10-29 PROCEDURE — 97116 GAIT TRAINING THERAPY: CPT

## 2018-10-29 PROCEDURE — 99218 HC RM OBSERVATION: CPT

## 2018-10-29 PROCEDURE — 94760 N-INVAS EAR/PLS OXIMETRY 1: CPT

## 2018-10-29 PROCEDURE — 84100 ASSAY OF PHOSPHORUS: CPT | Performed by: HOSPITALIST

## 2018-10-29 PROCEDURE — 80053 COMPREHEN METABOLIC PANEL: CPT | Performed by: HOSPITALIST

## 2018-10-29 PROCEDURE — 83735 ASSAY OF MAGNESIUM: CPT | Performed by: HOSPITALIST

## 2018-10-29 PROCEDURE — 85027 COMPLETE CBC AUTOMATED: CPT | Performed by: HOSPITALIST

## 2018-10-29 RX ADMIN — LATANOPROST 1 DROP: 50 SOLUTION OPHTHALMIC at 21:10

## 2018-10-29 RX ADMIN — Medication 10 ML: at 21:10

## 2018-10-29 RX ADMIN — ACETAMINOPHEN 650 MG: 325 TABLET ORAL at 01:56

## 2018-10-29 RX ADMIN — VITAMIN D, TAB 1000IU (100/BT) 1000 UNITS: 25 TAB at 08:58

## 2018-10-29 RX ADMIN — AMLODIPINE BESYLATE 5 MG: 5 TABLET ORAL at 08:58

## 2018-10-29 NOTE — PROGRESS NOTES
Occupational Therapy Acknowledge OT orders and completed chart review. Nursing cleared for OT. Attempted eval however received PCTs leaving room following assist with transfer from chair to bed. Patient politely deferring therapy this afternoon as she has been up in chair all day and is tired, in need of nap. Patient acknowledges multiple falls at home and voices fear of falling. Discussed patient with PT who recommend inpatient rehab. Will follow up for OT tomorrow. Thank you, Yoel Wright OTR/KIM

## 2018-10-29 NOTE — PROGRESS NOTES
Problem: Mobility Impaired (Adult and Pediatric) Goal: *Acute Goals and Plan of Care (Insert Text) Physical Therapy Goals Initiated 10/26/2018 1. Patient will move from supine to sit and sit to supine  in bed with minimal assistance/contact guard assist within 7 day(s). 2.  Patient will transfer from bed to chair and chair to bed with supervision/set-up using the least restrictive device within 7 day(s). 3.  Patient will perform sit to stand with supervision/set-up within 7 day(s). 4.  Patient will ambulate with supervision/set-up for 150 feet with the least restrictive device within 7 day(s). physical Therapy TREATMENT Patient: Marialuisa Hinojosa (80 y.o. female) Date: 10/29/2018 Diagnosis: Generalized weakness Generalized weakness Precautions:   
Chart, physical therapy assessment, plan of care and goals were reviewed. ASSESSMENT: 
Patient received sitting up in recliner having just finished lunch. Agreeable to therapy. Discussed with patient about therapy purpose and working on gait. She stated she didn't want to use a walker therefore assessed without and requiring bilateral hand hold assist with therapist in front and patient holding to arms, slow gait with posterior lean. Then assess with walker with much improved pattern and no lean. Did have small LOB during turn. Educated on the importance of mobility and increasing activity to assist with getting back home to spouse. She did state having several falls but thinks a walker will make her fall. After practice with walker, she did seem more accepting. With her multiple falls, feel she will need inpatient rehab to maximize her stability, safety and strength. Progression toward goals: 
[]    Improving appropriately and progressing toward goals 
[]    Improving slowly and progressing toward goals 
[]    Not making progress toward goals and plan of care will be adjusted PLAN: 
 Patient continues to benefit from skilled intervention to address the above impairments. Continue treatment per established plan of care. Discharge Recommendations:  Rehab and Inpatient Rehab Further Equipment Recommendations for Discharge:  None if to rehab SUBJECTIVE:  
Patient stated one time I fell in the bathtub and sat there for 4 hours. Mehul Vargas OBJECTIVE DATA SUMMARY:  
Critical Behavior: 
Neurologic State: Alert, Appropriate for age Orientation Level: Oriented X4, Appropriate for age Cognition: Appropriate decision making, Appropriate for age attention/concentration, Appropriate safety awareness, Follows commands Functional Mobility Training: 
  
Transfers: 
Sit to Stand: Contact guard assistance Stand to Sit: Contact guard assistance Balance: 
Sitting: Impaired Standing: Impaired; With support Standing - Static: Fair Standing - Dynamic : FairAmbulation/Gait Training: 
Distance (ft): 10 Feet (ft)(x2) 
Assistive Device: Gait belt;Walker, rolling(Hand hold assist) Ambulation - Level of Assistance: Moderate assistance;Contact guard assistance 
 contact guard when using walker with min assist for walker management Gait Abnormalities: Decreased step clearance Speed/Malorie: Pace decreased (<100 feet/min) Step Length: Right shortened;Left shortened After treatment:  
[x]    Patient left in no apparent distress sitting up in chair 
[]    Patient left in no apparent distress in bed 
[x]    Call bell left within reach [x]    Nursing notified 
[]    Caregiver present 
[]    Bed alarm activated COMMUNICATION/COLLABORATION:  
The patients plan of care was discussed with: Registered Nurse and  Raymona Rubinstein, PT Time Calculation: 14 mins

## 2018-10-29 NOTE — PROGRESS NOTES
Hospitalist Progress Note Bhavana Beard MD 
Answering service: [phone] OR [phone] from in house phone Cell: [phone] Date of Service:  10/29/2018 NAME:  Lola Cat :  10/11/1932 MRN:  [id number] Admission Summary:  
Lola Cat is a 80 y.o. female arthritis, HTN who was brought in by EMS to the ED from home s/p fall and inability to walk. Patient states she fell 3 days ago, was unable to get up,  would help her get up to a different spot. She states she has not eaten or had anything to drink since she fell 3 days ago. Her mouth is dry. She has not been passing urine or stools since she fell. She does not know how she fell, reports hitting her head, but not losing consciousness. She denies fever, nausea or vomiting, but complaining of CP, L upper abdominal pain, and chills currently. Interval history / Subjective: She said she feels the same, no headache Assessment & Plan:  
 
Recurrent fall due to generalized weakness and ambulatory dysfunction  
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 
-continue normal saline 
-Vitamin B12 elevated -PT/OT 
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 
-report on CT head mass measuring 2.2 cm with primary considerations including aneurysm or pituitary adenoma. 
-not sure chronic 
-TSH low, will repeat TSH and free T4 
-She said she aware about the finding and she wants to follow up as an outpatient. She said she has appointment to see Dr Muriel Galeazzi at [address] [place] [address] 
Atypical chest pain likely due to musculoskeletal  
-troponin <0.05 
-ekg normal sinus vent rate 97 bpm left axis non specific st t wave 
-chest x ray no acute disease 
-SpO2 % on RA 
-chest pain resolved Hypoglycemia due to poor oral intake 
-A1c 4.7 
-finger stick glucose is running low, continue D5 ringer lactate, monitor finger stick glucose SIRS POA 
-leukocytosis improving, lactic acid 1.9, afebrile, 
-leukocytosis resolved 
-UA no evidence of infection Hypokalemia/Hypophosphatemia  
-replaced and resolved HTN 
-BP normal, continue norvasc, monitor BP Chronic severe eczema 
-has chronic eczema on hands, lower extremities and face 
-continue protective skin cream  
 
 
Code status: Full Code DVT prophylaxis: SCD Care Plan discussed with: Patient/Family and Nurse Disposition: SNF/LTC Hospital Problems  Date Reviewed: 10/26/2018 Codes Class Noted POA * (Principal) Generalized weakness ICD-10-CM: R53.1 ICD-9-CM: 780.79  10/26/2018 Unknown Hypoglycemia ICD-10-CM: E16.2 ICD-9-CM: 251.2  10/26/2018 Yes Hypokalemia ICD-10-CM: E87.6 ICD-9-CM: 276.8  10/26/2018 Yes SIRS (systemic inflammatory response syndrome) (HCC) ICD-10-CM: R65.10 ICD-9-CM: 995.90  10/26/2018 Yes Hypertension (Chronic) ICD-10-CM: I10 
ICD-9-CM: 401.9  10/26/2018 Yes Arthritis (Chronic) ICD-10-CM: M19.90 ICD-9-CM: 716.90  10/26/2018 Yes Vital Signs:  
 Last 24hrs VS reviewed since prior progress note. Most recent are: 
Visit Vitals /56 (BP 1 Location: Left arm, BP Patient Position: At rest) Pulse 86 Temp 98.9 °F (37.2 °C) Resp 19 SpO2 99% Intake/Output Summary (Last 24 hours) at 10/29/2018 1152 Last data filed at 10/29/2018 6548 Gross per 24 hour Intake 120 ml Output 200 ml Net -80 ml Physical Examination:  
 
 
     
Constitutional:  No acute distress, cooperative, pleasant   
ENT:  Oral mucous moist, oropharynx benign. Neck supple, Resp:  CTA bilaterally. No wheezing/rhonchi/rales. No accessory muscle use CV:  Regular rhythm, normal rate, no murmurs, gallops, rubs GI:  Soft, non distended, non tender. normoactive bowel sounds, no hepatosplenomegaly  Musculoskeletal:  No edema,  
 Neurologic:  Conscious and alert, well oriented, motor UE 5/5, LE 4/5 Skin:  Hyperpigmented, scaly eczematous skin all over includidng face Data Review:  
 Review and/or order of clinical lab test 
 
 
Labs:  
 
Recent Labs 10/29/18 
0210 10/28/18 
0542 WBC 7.1 9.3 HGB 9.2* 9.0*  
HCT 30.6* 29.5*  
 326 Recent Labs 10/29/18 
0210 10/28/18 
0542 10/27/18 
5254  145 144  
K 3.9 3.0* 3.3*  
 109* 110* CO2 27 26 21 BUN 4* 4* 4*  
CREA 0.46* 0.44* 0.55 GLU 67 78 70 CA 7.7* 7.9* 7.9*  
MG 1.7 1.7 1.7 PHOS 3.4 2.2* 1.5* Recent Labs 10/29/18 
0210 10/28/18 
0542 10/27/18 
2903 SGOT 30 35 52* ALT 16 18 21 AP 43* 42* 45  
TBILI 0.7 0.6 0.9 TP 6.3* 6.2* 6.8 ALB 2.2* 2.4* 2.5*  
GLOB 4.1* 3.8 4.3* No results for input(s): INR, PTP, APTT in the last 72 hours. No lab exists for component: INREXT, INREXT No results for input(s): FE, TIBC, PSAT, FERR in the last 72 hours. No results found for: FOL, RBCF No results for input(s): PH, PCO2, PO2 in the last 72 hours. Recent Labs 10/27/18 
2625 10/27/18 
0248 10/26/18 
2033 TROIQ <0.05 <0.05 <0.05 No results found for: CHOL, CHOLX, CHLST, CHOLV, HDL, LDL, LDLC, DLDLP, TGLX, TRIGL, TRIGP, CHHD, CHHDX Lab Results Component Value Date/Time Glucose (POC) 78 10/29/2018 11:34 AM  
 Glucose (POC) 92 10/29/2018 09:00 AM  
 Glucose (POC) 71 10/29/2018 05:58 AM  
 Glucose (POC) 85 10/28/2018 10:47 PM  
 Glucose (POC) 89 10/28/2018 05:02 PM  
 
Lab Results Component Value Date/Time  Color DARK YELLOW 10/26/2018 12:04 PM  
 Appearance CLEAR 10/26/2018 12:04 PM  
 Specific gravity 1.021 10/26/2018 12:04 PM  
 pH (UA) 6.0 10/26/2018 12:04 PM  
 Protein 30 (A) 10/26/2018 12:04 PM  
 Glucose NEGATIVE  10/26/2018 12:04 PM  
 Ketone 80 (A) 10/26/2018 12:04 PM  
 Urobilinogen 1.0 10/26/2018 12:04 PM  
 Nitrites NEGATIVE  10/26/2018 12:04 PM  
 Leukocyte Esterase NEGATIVE  10/26/2018 12:04 PM  
 Epithelial cells FEW 10/26/2018 12:04 PM  
 Bacteria NEGATIVE  10/26/2018 12:04 PM  
 WBC 0-4 10/26/2018 12:04 PM  
 RBC 0-5 10/26/2018 12:04 PM  
 
 
 
Medications Reviewed:  
 
Current Facility-Administered Medications Medication Dose Route Frequency  amLODIPine (NORVASC) tablet 5 mg  5 mg Oral DAILY  cholecalciferol (VITAMIN D3) tablet 1,000 Units  1,000 Units Oral DAILY  latanoprost (XALATAN) 0.005 % ophthalmic solution 1 Drop  1 Drop Both Eyes QHS  sodium chloride (NS) flush 5-10 mL  5-10 mL IntraVENous Q8H  
 sodium chloride (NS) flush 5-10 mL  5-10 mL IntraVENous PRN  
 ondansetron (ZOFRAN) injection 4 mg  4 mg IntraVENous Q4H PRN  
 acetaminophen (TYLENOL) tablet 650 mg  650 mg Oral Q4H PRN  
 glucose chewable tablet 16 g  4 Tab Oral PRN  
 dextrose (D50W) injection syrg 12.5-25 g  25-50 mL IntraVENous PRN  
 glucagon (GLUCAGEN) injection 1 mg  1 mg IntraMUSCular PRN  
 insulin lispro (HUMALOG) injection   SubCUTAneous AC&HS  
 
______________________________________________________________________ EXPECTED LENGTH OF STAY: - - - 
ACTUAL LENGTH OF STAY:          0 Isabel Bates MD

## 2018-10-29 NOTE — PROGRESS NOTES
HYPOGLYCEMIC EPISODE DOCUMENTATION Patient with hypoglycemic episode(s) at 1134(time) on 10/29/2018(date). BG value(s) pre-treatment 92 Was patient symptomatic? [] yes, [x] no Patient was treated with the following rescue medications/treatments: [] D50 [] Glucose tablets 
              [] Glucagon 
              [x] 4oz juice 
              [] 6oz reg soda 
              [] 8oz low fat milk BG value post-treatment: Once BG treated and value greater than 80mg/dl, pt was provided with the following: 
[] snack 
[] meal 
Name of MD notified:  Dr. Caleb Doshi The following orders were received:

## 2018-10-29 NOTE — PROGRESS NOTES
Bedside and Verbal shift change report given to Warden Moi HUDSON (oncoming nurse) by Xiomara Pugh (offgoing nurse). Report included the following information SBAR, Kardex, Intake/Output, MAR, Accordion, Recent Results and Med Rec Status.

## 2018-10-30 LAB
GLUCOSE BLD STRIP.AUTO-MCNC: 110 MG/DL (ref 65–100)
GLUCOSE BLD STRIP.AUTO-MCNC: 113 MG/DL (ref 65–100)
GLUCOSE BLD STRIP.AUTO-MCNC: 63 MG/DL (ref 65–100)
GLUCOSE BLD STRIP.AUTO-MCNC: 73 MG/DL (ref 65–100)
GLUCOSE BLD STRIP.AUTO-MCNC: 83 MG/DL (ref 65–100)
SERVICE CMNT-IMP: ABNORMAL
SERVICE CMNT-IMP: NORMAL
SERVICE CMNT-IMP: NORMAL

## 2018-10-30 PROCEDURE — G8988 SELF CARE GOAL STATUS: HCPCS

## 2018-10-30 PROCEDURE — 82962 GLUCOSE BLOOD TEST: CPT

## 2018-10-30 PROCEDURE — G8987 SELF CARE CURRENT STATUS: HCPCS

## 2018-10-30 PROCEDURE — 97165 OT EVAL LOW COMPLEX 30 MIN: CPT

## 2018-10-30 PROCEDURE — 74011250637 HC RX REV CODE- 250/637: Performed by: INTERNAL MEDICINE

## 2018-10-30 PROCEDURE — 99218 HC RM OBSERVATION: CPT

## 2018-10-30 PROCEDURE — 77030011255 HC DSG AQUACEL AG BMS -A

## 2018-10-30 RX ADMIN — WHITE PETROLATUM: 1.75 OINTMENT TOPICAL at 21:23

## 2018-10-30 RX ADMIN — VITAMIN D, TAB 1000IU (100/BT) 1000 UNITS: 25 TAB at 09:29

## 2018-10-30 RX ADMIN — LATANOPROST 1 DROP: 50 SOLUTION OPHTHALMIC at 21:23

## 2018-10-30 RX ADMIN — Medication 10 ML: at 06:00

## 2018-10-30 RX ADMIN — AMLODIPINE BESYLATE 5 MG: 5 TABLET ORAL at 09:29

## 2018-10-30 RX ADMIN — Medication 10 ML: at 21:23

## 2018-10-30 RX ADMIN — Medication 10 ML: at 13:38

## 2018-10-30 NOTE — DIABETES MGMT
DTC Progress Note Recommendations/ Comments: Chart reviewed for hypoglycemia, lowest blood sugars was 63 mg/dl this morning. Hypoglycemia likely due to poor po intake. Noted D5 ringer discontinued. If appropriate, please consider: 
Addition of IV dextrose Please obtain pt's weight Nutrition consult Current hospital DM medication: Humalog for correction, high sensitivity scale Chart reviewed on Nathan Altman. Patient is a 80 y.o. female with no hx of diabetes. A1c:  
Lab Results Component Value Date/Time Hemoglobin A1c 4.7 10/27/2018 02:48 AM  
 
 
Recent Glucose Results:  
Lab Results Component Value Date/Time GLUCPOC 83 10/30/2018 11:29 AM  
 GLUCPOC 73 10/30/2018 07:24 AM  
 GLUCPOC 63 (L) 10/30/2018 07:15 AM  
  
 
Lab Results Component Value Date/Time Creatinine 0.46 (L) 10/29/2018 02:10 AM  
 
CrCl cannot be calculated (Unknown ideal weight. ). Active Orders Diet DIET MECHANICAL SOFT  
  
 
PO intake:  
Patient Vitals for the past 72 hrs: 
 % Diet Eaten 10/30/18 1341 10 % 10/29/18 0905 100 % Will continue to follow as needed. Thank you Isabel Perez RD, CDE [facility] Pager: [phone]

## 2018-10-30 NOTE — PROGRESS NOTES
Occupational Therapy Chart reviewed, spoke with PT at rounds, patient in bed currently refusing this OT this AM due to fatigue and pain, including being up all night due to pain/skin issues. Educated on benefit of OT, evaluation and goals. Patient continued to decline any activity including bed mobility. Will attempt OT evaluation again later as able. Kory Bhatti.  MS Christine OTR/L

## 2018-10-30 NOTE — PROGRESS NOTES
Bedside and Verbal shift change report given to Virginia RN (oncoming nurse) by Bossman Jenkins (offgoing nurse). Report included the following information SBAR.

## 2018-10-30 NOTE — PROGRESS NOTES
Physical Therapy Attempted to see patient twice today. Each time she states \"not today. \"  States she is too tired and needs rest, also stating just doesn't feel good. Explained to patient the importance of therapy and participating. She indicates understanding but continued to refuse. Will follow up tomorrow. Did let nursing know [facility]  
Ankita Farris, PT

## 2018-10-30 NOTE — PROGRESS NOTES
Referral sent to [facility] (formerly [facility]. Patient approved for admission tomorrow for inpt rehab. CM discussed with patient and she is in agreement with plans. CM spoke with the patient's  (by phone) and he is in agreement with plans. CM also called the patient's sister Bria Flores) and left a voicemail message regarding d/c tomorrow. ANNA Hernandez, CRM 
 
5:27p  Ambulance transport requested for 1pm 10/31/18. CM awaiting confirmation of transport time. ANNA Hernandez, CRM 
 
1pm confirmed for transport 10/31/18.

## 2018-10-30 NOTE — PROGRESS NOTES
Problem: Self Care Deficits Care Plan (Adult) Goal: *Acute Goals and Plan of Care (Insert Text) Occupational Therapy Goals Initiated 10/30/2018 1. Patient will perform self-feeding with modified independence within 7 day(s). 2.  Patient will perform grooming with supervision/set-up seated EOB  For >2 minutes within 7 day(s). 3.  Patient will perform upper body dressing with supervision/set-up within 7 day(s). 4.  Patient will perform toilet transfers with moderate assistance  within 7 day(s). 5.  Patient will perform all aspects of toileting with moderate assistance  within 7 day(s). 6.  Patient will participate in upper extremity therapeutic exercise/activities with supervision/set-up for 5 minutes within 7 day(s). 7.  Patient will utilize energy conservation techniques during functional activities with verbal cues within 7 day(s). Occupational Therapy EVALUATION Patient: Doyle Montana (80 y.o. female) Date: 10/30/2018 Primary Diagnosis: Generalized weakness Precautions: Other (comment), Fall(latex) ASSESSMENT : 
Based on the objective data described below, the patient presents with decreased participation and activity with max encouragement to participation, currently required assist x2 for boost up in bed, set up for self feeding task and currently total A for all basic self care UB and LB due to inability to get OOB. Declined EOB or other bed mobility activity, will need rehab at discharge. Patient will benefit from skilled intervention to address the above impairments. Patients rehabilitation potential is considered to be Fair Factors which may influence rehabilitation potential include:  
[]             None noted []             Mental ability/status []             Medical condition []             Home/family situation and support systems [x]             Safety awareness []             Pain tolerance/management 
[]             Other: PLAN : 
 Recommendations and Planned Interventions: 
[x]               Self Care Training                  [x]        Therapeutic Activities [x]               Functional Mobility Training    []        Cognitive Retraining 
[x]               Therapeutic Exercises           [x]        Endurance Activities [x]               Balance Training                   []        Neuromuscular Re-Education []               Visual/Perceptual Training     [x]   Home Safety Training 
[x]               Patient Education                 []        Family Training/Education []               Other (comment): Frequency/Duration: Patient will be followed by occupational therapy 2 times a week to address goals. Discharge Recommendations: Rehab Further Equipment Recommendations for Discharge: TBD, may need RW, BSC or other DME IF going home vs rehab SUBJECTIVE:  
Patient stated I just want to sleep, please let me sleep.  OBJECTIVE DATA SUMMARY:  
HISTORY:  
Past Medical History:  
Diagnosis Date  Arthritis  Hypertension Past Surgical History:  
Procedure Laterality Date  HX HYSTERECTOMY  HX TONSILLECTOMY Prior Level of Function/Environment/Context: lives with family who assists with ADL only as needed per patient report, poor historian will attempt to get further PLOF Occupations in which the patient is/was successful, what are the barriers preventing that success:  
Performance Patterns (routines, roles, habits, and rituals):  
Personal Interests and/or values:  
Expanded or extensive additional review of patient history:  
 
Home Situation Home Environment: Private residence # Steps to Enter: 0 One/Two Story Residence: One story Living Alone: No 
Support Systems: Family member(s) Patient Expects to be Discharged to[de-identified] Unknown Current DME Used/Available at Home: None(Will need RW) Hand dominance: RightEXAMINATION OF PERFORMANCE DEFICITS: 
Cognitive/Behavioral Status: 
Neurologic State: Alert Orientation Level: Oriented X4 Cognition: Poor safety awareness Safety/Judgement: Decreased awareness of need for safety;Decreased insight into deficits Skin: impaired, dry skin/excema all over face/hands/legs Edema: none noted Hearing: Auditory Auditory Impairment: None Vision/Perceptual:   
   NT, unable Range of Motion: 
B UE NT, able to reach for spoon/fork, off tray with R hand other wise non-functional refusing Strength: 
Functional, Otherwise TBD Strength: Generally decreased, functional 
  
  
  
  
Coordination: 
Coordination: Generally decreased, functional 
Fine Motor Skills-Upper: Left Intact; Right Intact(weakness) Tone & Sensation: 
 
Tone: Normal 
  
  
  
  
  
  
  
Balance: 
  
Functional Mobility and Transfers for ADLs:Bed Mobility: 
Scooting: Total assistance;Assist x2(boost up in bed) Transfers: Toilet Transfer : Total assistance(unable) ADL Assessment: 
Feeding: Setup(for spoon feeding, handing fork/spoon) Oral Facial Hygiene/Grooming: Total assistance Bathing: Total assistance Upper Body Dressing: Total assistance Lower Body Dressing: Total assistance Toileting: Total assistance ADL Intervention and task modifications: 
  Completed OT evaluation and ADLs in bed due to patient's refusal to sit EOB or in bed-chair positiong. Noted patient pocketing food from previous attempt to feed for lunch, alerted RN to be aware of this, as patient was sleeping with food in mouth. Declined some ADLs but participating in self feeding trial, frederick to feed self pudding off tray without assist. RN alerted of soft food needed. Educated on safety and endurance training with encouragement for full participation in ADLs while in hospital. Fair insight and understanding noted.   
 
  
  Patient instructed and indicated understanding the benefits of maintaining activity tolerance, functional mobility, and independence with self care tasks during acute stay  to ensure safe return home and to baseline. Encouraged patient to increase frequency and duration OOB, be out of bed for all meals, perform daily ADLs (as approved by RN/MD regarding bathing etc), and performing functional mobility to/from bathroom. Cognitive Retraining Safety/Judgement: Decreased awareness of need for safety;Decreased insight into deficits Therapeutic Exercise: 
encouraged OOB and full participation with ADLs to improve strength and endurance. Functional Measure: 
Barthel Index: 
 
Bathin Bladder: 0 Bowels: 5 Groomin Dressin Feedin Mobility: 0 Stairs: 0 Toilet Use: 0 Transfer (Bed to Chair and Back): 0 Total: 10 Barthel and G-code impairment scale: 
Percentage of impairment CH 
0% CI 
1-19% CJ 
20-39% CK 
40-59% CL 
60-79% CM 
80-99% CN 
100% Barthel Score 0-100 100 99-80 79-60 59-40 20-39 1-19 
 0 Barthel Score 0-20 20 17-19 13-16 9-12 5-8 1-4 0 The Barthel ADL Index: Guidelines 1. The index should be used as a record of what a patient does, not as a record of what a patient could do. 2. The main aim is to establish degree of independence from any help, physical or verbal, however minor and for whatever reason. 3. The need for supervision renders the patient not independent. 4. A patient's performance should be established using the best available evidence. Asking the patient, friends/relatives and nurses are the usual sources, but direct observation and common sense are also important. However direct testing is not needed. 5. Usually the patient's performance over the preceding 24-48 hours is important, but occasionally longer periods will be relevant. 6. Middle categories imply that the patient supplies over 50 per cent of the effort. 7. Use of aids to be independent is allowed. Deborah Leon., Barthel, D.W. (4565). Functional evaluation: the Barthel Index. [facility] (14)2. ABBEY Fallon, Aguilar Chung.Ayo., [place], [address] (1999). Measuring the change indisability after inpatient rehabilitation; comparison of the responsiveness of the Barthel Index and Functional [place] Measure. Journal of Neurology, Neurosurgery, and Psychiatry, 66(4), 882-444. CHRIS Palma, ANIA Aguayo, & Lorraine Turner M.A. (2004.) Assessment of post-stroke quality of life in cost-effectiveness studies: The usefulness of the Barthel Index and the EuroQoL-5D. [facility], 13, 644-53 G codes: In compliance with CMSs Claims Based Outcome Reporting, the following G-code set was chosen for this patient based on their primary functional limitation being treated: The outcome measure chosen to determine the severity of the functional limitation was the barthel index with a score of 10/100 which was correlated with the impairment scale. ? Self Care:  
  - CURRENT STATUS: CM - 80%-99% impaired, limited or restricted  - GOAL STATUS: CL - 60%-79% impaired, limited or restricted  - D/C STATUS:  ---------------To be determined--------------- Occupational Therapy Evaluation Charge Determination History Examination Decision-Making MEDIUM Complexity : Expanded review of history including physical, cognitive and psychosocial  history  MEDIUM Complexity : 3-5 performance deficits relating to physical, cognitive , or psychosocial skils that result in activity limitations and / or participation restrictions MEDIUM Complexity : Patient may present with comorbidities that affect occupational performnce. Miniml to moderate modification of tasks or assistance (eg, physical or verbal ) with assesment(s) is necessary to enable patient to complete evaluation Based on the above components, the patient evaluation is determined to be of the following complexity level: MEDIUM Pain: 
Pain Scale 1: Numeric (0 - 10) Pain Intensity 1: 0 Activity Tolerance:  
poor Please refer to the flowsheet for vital signs taken during this treatment. After treatment:  
[] Patient left in no apparent distress sitting up in chair 
[x] Patient left in no apparent distress in bed 
[x] Call bell left within reach [x] Nursing notified 
[] Caregiver present 
[] Bed alarm activated COMMUNICATION/EDUCATION:  
The patients plan of care was discussed with: Physical Therapist and Registered Nurse. [x] Home safety education was provided and the patient/caregiver indicated understanding. [x] Patient/family have participated as able in goal setting and plan of care. [x] Patient/family agree to work toward stated goals and plan of care. [] Patient understands intent and goals of therapy, but is neutral about his/her participation. [] Patient is unable to participate in goal setting and plan of care. This patients plan of care is appropriate for delegation to [facility]. Thank you for this referral. 
Linda Moeller OT Time Calculation: 10 mins

## 2018-10-30 NOTE — PROGRESS NOTES
Problem: Falls - Risk of 
Goal: *Absence of Falls Document Beatriz Evens Fall Risk and appropriate interventions in the flowsheet. Outcome: Progressing Towards Goal 
Fall Risk Interventions: 
Mobility Interventions: Patient to call before getting OOB Medication Interventions: Patient to call before getting OOB Elimination Interventions: Call light in reach History of Falls Interventions: Consult care management for discharge planning

## 2018-10-30 NOTE — PROGRESS NOTES
Bedside and Verbal shift change report given to Dontrell Naqvi (oncoming nurse) by Aline Miller (offgoing nurse). Report included the following information SBAR.

## 2018-10-30 NOTE — PROGRESS NOTES
Hospitalist Progress Note Alex Mckinnon MD 
Answering service: [phone] OR [phone] from in house phone Cell: [phone] Date of Service:  10/30/2018 NAME:  Maria Luisa Merchant :  10/11/1932 MRN:  [id number] Admission Summary:  
Maria Luisa Merchant is a 80 y.o. female arthritis, HTN who was brought in by EMS to the ED from home s/p fall and inability to walk. Patient states she fell 3 days ago, was unable to get up,  would help her get up to a different spot. She states she has not eaten or had anything to drink since she fell 3 days ago. Her mouth is dry. She has not been passing urine or stools since she fell. She does not know how she fell, reports hitting her head, but not losing consciousness. She denies fever, nausea or vomiting, but complaining of CP, L upper abdominal pain, and chills currently. Interval history / Subjective: She said she feels the same,  No complaints. Face covered with thick scaly rash not itching Assessment & Plan:  
 
Recurrent fall: due to generalized weakness and ambulatory dysfunction  
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 
-Vitamin B12 elevated -PT/OT-Fall precaution Hyperdense mass in the sella/suprasellar space on CT scan 
-report on CT head mass measuring 2.2 cm with primary considerations including aneurysm or pituitary adenoma. 
-not sure chronic -TSH low, will repeat TSH and free T4 
-She said she aware about the finding and she wants to follow up as an outpatient. She said she has appointment to see Dr Tony Hussein at [facility] Hypoglycemia: likely due to poor po intake-A1c 4.7 -continue D5 ringer lactate, monitor finger stick glucose Atypical chest pain likely due to musculoskeletal - resolved SIRS POA- resolved Hypokalemia/Hypophosphatemia -replaced and resolved HTN-BP normal, continue norvasc, monitor BP 
 Chronic severe eczema-on hands, lower extremities and face-continue protective skin cream  
 
 
Code status: Full Code DVT prophylaxis: SCDs Care Plan discussed with: Patient/Family and Nurse Disposition: SNF/LTC Hospital Problems  Date Reviewed: 10/26/2018 Codes Class Noted POA * (Principal) Generalized weakness ICD-10-CM: R53.1 ICD-9-CM: 780.79  10/26/2018 Unknown Hypoglycemia ICD-10-CM: E16.2 ICD-9-CM: 251.2  10/26/2018 Yes Hypokalemia ICD-10-CM: E87.6 ICD-9-CM: 276.8  10/26/2018 Yes SIRS (systemic inflammatory response syndrome) (HCC) ICD-10-CM: R65.10 ICD-9-CM: 995.90  10/26/2018 Yes Hypertension (Chronic) ICD-10-CM: I10 
ICD-9-CM: 401.9  10/26/2018 Yes Arthritis (Chronic) ICD-10-CM: M19.90 ICD-9-CM: 716.90  10/26/2018 Yes Vital Signs:  
 Last 24hrs VS reviewed since prior progress note. Most recent are: 
Visit Vitals /63 (BP 1 Location: Right arm, BP Patient Position: At rest) Pulse 82 Temp 99.1 °F (37.3 °C) Resp 18 SpO2 99% Intake/Output Summary (Last 24 hours) at 10/30/2018 1312 Last data filed at 10/30/2018 1137 Gross per 24 hour Intake 360 ml Output 180 ml Net 180 ml Physical Examination:  
 
 
     
Constitutional:  No acute distress, cooperative, pleasant   
   
Resp:  CTA bilaterally. No wheezing/rhonchi/rales. No accessory muscle use CV:  Regular rhythm, normal rate, no murmurs, gallops, rubs GI:  Soft, non distended, non tender. normoactive bowel sounds, no hepatosplenomegaly Musculoskeletal:  No edema, Neurologic:  Conscious and alert, Pleasantly confused, motor UE 5/5, LE 4/5 Skin:  Hyperpigmented, scaly eczematous skin all over includidng face Data Review:  
 Review and/or order of clinical lab test 
 
 
Labs:  
 
Recent Labs 10/29/18 
0210 10/28/18 
0542 WBC 7.1 9.3 HGB 9.2* 9.0*  
HCT 30.6* 29.5*  
 326 Recent Labs 10/29/18 
0210 10/28/18 0542  
 145  
K 3.9 3.0*  
 109* CO2 27 26 BUN 4* 4*  
CREA 0.46* 0.44* GLU 67 78 CA 7.7* 7.9*  
MG 1.7 1.7 PHOS 3.4 2.2* Recent Labs 10/29/18 
0210 10/28/18 
0542 SGOT 30 35 ALT 16 18 AP 43* 42* TBILI 0.7 0.6 TP 6.3* 6.2* ALB 2.2* 2.4*  
GLOB 4.1* 3.8 No results for input(s): INR, PTP, APTT in the last 72 hours. No lab exists for component: INREXT, INREXT No results for input(s): FE, TIBC, PSAT, FERR in the last 72 hours. No results found for: FOL, RBCF No results for input(s): PH, PCO2, PO2 in the last 72 hours. No results for input(s): CPK, CKNDX, TROIQ in the last 72 hours. No lab exists for component: CPKMB No results found for: CHOL, CHOLX, CHLST, CHOLV, HDL, LDL, LDLC, DLDLP, TGLX, TRIGL, TRIGP, CHHD, CHHDX Lab Results Component Value Date/Time Glucose (POC) 83 10/30/2018 11:29 AM  
 Glucose (POC) 73 10/30/2018 07:24 AM  
 Glucose (POC) 63 (L) 10/30/2018 07:15 AM  
 Glucose (POC) 102 (H) 10/29/2018 09:54 PM  
 Glucose (POC) 75 10/29/2018 09:21 PM  
 
Lab Results Component Value Date/Time Color DARK YELLOW 10/26/2018 12:04 PM  
 Appearance CLEAR 10/26/2018 12:04 PM  
 Specific gravity 1.021 10/26/2018 12:04 PM  
 pH (UA) 6.0 10/26/2018 12:04 PM  
 Protein 30 (A) 10/26/2018 12:04 PM  
 Glucose NEGATIVE  10/26/2018 12:04 PM  
 Ketone 80 (A) 10/26/2018 12:04 PM  
 Urobilinogen 1.0 10/26/2018 12:04 PM  
 Nitrites NEGATIVE  10/26/2018 12:04 PM  
 Leukocyte Esterase NEGATIVE  10/26/2018 12:04 PM  
 Epithelial cells FEW 10/26/2018 12:04 PM  
 Bacteria NEGATIVE  10/26/2018 12:04 PM  
 WBC 0-4 10/26/2018 12:04 PM  
 RBC 0-5 10/26/2018 12:04 PM  
 
 
 
Medications Reviewed:  
 
Current Facility-Administered Medications Medication Dose Route Frequency  pantothenic ac-min oil-pet,hyd (AQUAPHOR) 41 % ointment   Topical PRN  
 amLODIPine (NORVASC) tablet 5 mg  5 mg Oral DAILY  cholecalciferol (VITAMIN D3) tablet 1,000 Units  1,000 Units Oral DAILY  latanoprost (XALATAN) 0.005 % ophthalmic solution 1 Drop  1 Drop Both Eyes QHS  sodium chloride (NS) flush 5-10 mL  5-10 mL IntraVENous Q8H  
 sodium chloride (NS) flush 5-10 mL  5-10 mL IntraVENous PRN  
 ondansetron (ZOFRAN) injection 4 mg  4 mg IntraVENous Q4H PRN  
 acetaminophen (TYLENOL) tablet 650 mg  650 mg Oral Q4H PRN  
 glucose chewable tablet 16 g  4 Tab Oral PRN  
 dextrose (D50W) injection syrg 12.5-25 g  25-50 mL IntraVENous PRN  
 glucagon (GLUCAGEN) injection 1 mg  1 mg IntraMUSCular PRN  
 insulin lispro (HUMALOG) injection   SubCUTAneous AC&HS  
 
______________________________________________________________________ EXPECTED LENGTH OF STAY: - - - 
ACTUAL LENGTH OF STAY:          0 Napoleon Hu MD

## 2018-10-30 NOTE — PROGRESS NOTES
..Bedside shift change report given to Giovanna Zepeda RN (oncoming nurse) by Rodrick Gutierrez RN (offgoing nurse). Report included the following information SBAR, Kardex and MAR.

## 2018-10-30 NOTE — PROGRESS NOTES
Bedside and Verbal shift change report given to Neena (oncoming nurse) by Madhavi Hauser RN (offgoing nurse). Report included the following information SBAR, Kardex, Intake/Output, MAR and Accordion.

## 2018-10-31 VITALS
RESPIRATION RATE: 18 BRPM | DIASTOLIC BLOOD PRESSURE: 69 MMHG | OXYGEN SATURATION: 98 % | SYSTOLIC BLOOD PRESSURE: 137 MMHG | HEART RATE: 91 BPM | TEMPERATURE: 98.9 F

## 2018-10-31 LAB
GLUCOSE BLD STRIP.AUTO-MCNC: 88 MG/DL (ref 65–100)
GLUCOSE BLD STRIP.AUTO-MCNC: 98 MG/DL (ref 65–100)
SERVICE CMNT-IMP: NORMAL
SERVICE CMNT-IMP: NORMAL
T4 FREE SERPL-MCNC: 1 NG/DL (ref 0.8–1.5)
TSH SERPL DL<=0.05 MIU/L-ACNC: 0.52 UIU/ML (ref 0.36–3.74)

## 2018-10-31 PROCEDURE — 84443 ASSAY THYROID STIM HORMONE: CPT | Performed by: INTERNAL MEDICINE

## 2018-10-31 PROCEDURE — 74011250637 HC RX REV CODE- 250/637: Performed by: INTERNAL MEDICINE

## 2018-10-31 PROCEDURE — 84439 ASSAY OF FREE THYROXINE: CPT | Performed by: INTERNAL MEDICINE

## 2018-10-31 PROCEDURE — 82962 GLUCOSE BLOOD TEST: CPT

## 2018-10-31 PROCEDURE — 99218 HC RM OBSERVATION: CPT

## 2018-10-31 PROCEDURE — 36415 COLL VENOUS BLD VENIPUNCTURE: CPT | Performed by: INTERNAL MEDICINE

## 2018-10-31 RX ADMIN — AMLODIPINE BESYLATE 5 MG: 5 TABLET ORAL at 08:46

## 2018-10-31 RX ADMIN — VITAMIN D, TAB 1000IU (100/BT) 1000 UNITS: 25 TAB at 08:46

## 2018-10-31 RX ADMIN — Medication 10 ML: at 05:27

## 2018-10-31 NOTE — DISCHARGE SUMMARY
Discharge Summary       PATIENT ID: Horacio Matos  MRN: [id number]   YOB: 1932    DATE OF ADMISSION: 10/26/2018 11:30 AM    DATE OF DISCHARGE: 10/31/2018   PRIMARY CARE PROVIDER: Ruperto Hobson MD     ATTENDING PHYSICIAN: Sim Feliciano MD  DISCHARGING PROVIDER: Sim Feliciano MD    To contact this individual call [phone] and ask the  to page. If unavailable ask to be transferred the Adult Hospitalist Department. CONSULTATIONS: ED CONSULT TO SENIOR SERVICES CASE MANAGEMENT  ED CONSULT TO SENIOR SERVICES PHYSICAL THERAPY    PROCEDURES/SURGERIES: * No surgery found *    ADMITTING DIAGNOSES & HOSPITAL COURSE:   Admitted with recurrent falls, CT head no acute findings, evaluated by PT/OT, and will be dc to [facility] for further rehab. 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 -PT/OT evaluated and pt will be discharged to inpatient rehab.     Hyperdense mass in the sella/suprasellar space on CT scan  -report on CT head mass measuring 2.2 cm with primary considerations including aneurysm or pituitary adenoma.-She said she aware about the finding and she wants to follow up as an outpatient. She said she has appointment to see Dr aMi Hendrix at [place]     Hypoglycemia: likely due to poor po intake-A1c 4.7.     Atypical chest pain likely due to musculoskeletal - resolved  Chronic severe eczema-on hands, lower extremities and face-continue protective skin cream and follow up with Dermatology outpatient.        FOLLOW UP APPOINTMENTS:    Follow-up Information     Follow up With Specialties Details Why Contact Info    Ruperto Hobson MD [facility] In 1 week  [address]  [phone]             ADDITIONAL CARE RECOMMENDATIONS: continue rehab, f/u with PCP    DIET: Resume previous diet    ACTIVITY: PT/OT Eval and Treat    DISCHARGE MEDICATIONS:  Current Discharge Medication List      CONTINUE these medications which have NOT CHANGED    Details   amLODIPine (NORVASC) 5 mg tablet Take 5 mg by mouth daily. hydroCHLOROthiazide (HYDRODIURIL) 12.5 mg tablet Take 12.5 mg by mouth daily. latanoprost (XALATAN) 0.005 % ophthalmic solution Administer 1 Drop to both eyes nightly. cholecalciferol, vitamin D3, (VITAMIN D3 PO) Take 1 Tab by mouth daily. Strength unknown. NOTIFY YOUR PHYSICIAN FOR ANY OF THE FOLLOWING:   Fever over 101 degrees for 24 hours. Chest pain, shortness of breath, fever, chills, nausea, vomiting, diarrhea, change in mentation, falling, weakness, bleeding. Severe pain or pain not relieved by medications. Or, any other signs or symptoms that you may have questions about. DISPOSITION:    Home With:   OT  PT  HH  RN       Long term SNF/Inpatient Rehab   x Independent/assisted living    Hospice    Other:       PATIENT CONDITION AT DISCHARGE:     Functional status    Poor     Deconditioned     Independent      Cognition     Lucid     Forgetful     Dementia      Catheters/lines (plus indication)    [place]     PICC     PEG     None      Code status     Full code     DNR      PHYSICAL EXAMINATION AT DISCHARGE:     Constitutional:  No acute distress, cooperative, pleasant          Resp:  CTA bilaterally. No wheezing/rhonchi/rales. No accessory muscle use   CV:  Regular rhythm, normal rate, no murmurs,    GI:  Soft, non distended, non tender.  normoactive bowel sounds,    Musculoskeletal:  No edema,    Neurologic:  Conscious and alert, Pleasantly confused, motor UE 5/5, LE 4/5                         Skin:  Hyperpigmented, scaly eczematous skin all over includidng face       CHRONIC MEDICAL DIAGNOSES:  Problem List as of 10/31/2018 Date Reviewed: 10/26/2018          Codes Class Noted - Resolved    * (Principal) Generalized weakness ICD-10-CM: R53.1  ICD-9-CM: 780.79  10/26/2018 - Present        Hypoglycemia ICD-10-CM: E16.2  ICD-9-CM: 251.2  10/26/2018 - Present        Hypokalemia ICD-10-CM: E87.6  ICD-9-CM: 276.8  10/26/2018 - Present        SIRS (systemic inflammatory response syndrome) (HCC) ICD-10-CM: R65.10  ICD-9-CM: 995.90  10/26/2018 - Present        Hypertension (Chronic) ICD-10-CM: I10  ICD-9-CM: 401.9  10/26/2018 - Present        Arthritis (Chronic) ICD-10-CM: M19.90  ICD-9-CM: 716.90  10/26/2018 - Present              Greater than 30 minutes were spent with the patient on counseling and coordination of care    Signed:   Biju Torrez MD  10/31/2018  9:30 AM

## 2018-10-31 NOTE — PROGRESS NOTES
Bedside and Verbal shift change report given to Dontrell Naqvi (oncoming nurse) by Yasmin Chavarria (offgoing nurse). Report included the following information SBAR and Kardex.

## 2018-10-31 NOTE — PROGRESS NOTES
..Bedside shift change report given to Elías Marshall RN (oncoming nurse) by iDane Anthony RN (offgoing nurse). Report included the following information SBAR, Kardex and MAR.

## 2018-10-31 NOTE — DISCHARGE INSTRUCTIONS
Discharge Instructions       PATIENT ID: Richelle Barnhart  MRN: [id number]   YOB: 1932    DATE OF ADMISSION: 10/26/2018 11:30 AM    DATE OF DISCHARGE: 10/31/2018    PRIMARY CARE PROVIDER: Effie Kramer MD     ATTENDING PHYSICIAN: Christina Lopes MD  DISCHARGING PROVIDER: Bonney Bernheim, MD    To contact this individual call [phone] and ask the  to page. If unavailable ask to be transferred the Adult Hospitalist Department. DISCHARGE DIAGNOSES Ambulatory dysfunction    CONSULTATIONS: ED CONSULT TO SENIOR SERVICES CASE MANAGEMENT  ED CONSULT TO SENIOR SERVICES PHYSICAL THERAPY    PROCEDURES/SURGERIES: * No surgery found *    FOLLOW UP APPOINTMENTS:   Follow-up Information     Follow up With Specialties Details Why Contact Info    Effie Kramer MD [facility] In 1 week  [address]  [phone]             ADDITIONAL CARE RECOMMENDATIONS: follow up with PCP, monitor mobility    DIET: Resume previous diet    DISCHARGE MEDICATIONS:  None new    · It is important that you take the medication exactly as they are prescribed. · Keep your medication in the bottles provided by the pharmacist and keep a list of the medication names, dosages, and times to be taken in your wallet. · Do not take other medications without consulting your doctor. NOTIFY YOUR PHYSICIAN FOR ANY OF THE FOLLOWING:   Fever over 101 degrees for 24 hours. Chest pain, shortness of breath, fever, chills, nausea, vomiting, diarrhea, change in mentation, falling, weakness, bleeding. Severe pain or pain not relieved by medications. Or, any other signs or symptoms that you may have questions about. It was our pleasure to help take care of you and we hope you get well very soon.     DISPOSITION:    Home With:   OT  PT  HH  RN      x SNF/Inpatient Rehab/LTAC    Independent/assisted living    Hospice    Other:     CDMP Checked:   Yes x     PROBLEM LIST Updated:  Yes x Signed:   Mary Carmen De Leon MD  10/31/2018  9:29 AM

## 2018-10-31 NOTE — PROGRESS NOTES
Problem: Pressure Injury - Risk of 
Goal: *Prevention of pressure injury Document Bon Scale and appropriate interventions in the flowsheet. Offload heels Frequent turns 
aquaphor Outcome: Resolved/Met Date Met: 10/31/18 Pressure Injury Interventions: 
Sensory Interventions: Assess changes in LOC, Keep linens dry and wrinkle-free Moisture Interventions: Absorbent underpads, Apply protective barrier, creams and emollients Activity Interventions: Increase time out of bed, Pressure redistribution bed/mattress(bed type) Mobility Interventions: HOB 30 degrees or less Nutrition Interventions: Document food/fluid/supplement intake, Offer support with meals,snacks and hydration Friction and Shear Interventions: HOB 30 degrees or less, Lift sheet, Lift team/patient mobility team

## 2018-10-31 NOTE — PROGRESS NOTES
Patient discharge today to [facility] [address] confirmed for transport 10/31/18. CM awaiting d/c summary. CM will fax discharge summary ([phone]) to the facility. Assigned nurse can call report to [phone].  
 
Portions of EMTALA completed at 9:22am. 
ANNA Reyes, CRM

## 2018-11-23 ENCOUNTER — APPOINTMENT (OUTPATIENT)
Dept: CT IMAGING | Age: 83
End: 2018-11-23
Attending: EMERGENCY MEDICINE
Payer: MEDICARE

## 2018-11-23 ENCOUNTER — HOSPITAL ENCOUNTER (EMERGENCY)
Age: 83
Discharge: HOME | End: 2018-11-23
Attending: EMERGENCY MEDICINE
Payer: MEDICARE

## 2018-11-23 ENCOUNTER — APPOINTMENT (OUTPATIENT)
Dept: GENERAL RADIOLOGY | Age: 83
End: 2018-11-23
Attending: EMERGENCY MEDICINE
Payer: MEDICARE

## 2018-11-23 VITALS
SYSTOLIC BLOOD PRESSURE: 141 MMHG | BODY MASS INDEX: 24.58 KG/M2 | OXYGEN SATURATION: 99 % | HEIGHT: 59 IN | DIASTOLIC BLOOD PRESSURE: 60 MMHG | WEIGHT: 121.91 LBS | RESPIRATION RATE: 17 BRPM | HEART RATE: 70 BPM | TEMPERATURE: 97.8 F
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
## 2018-11-23 DIAGNOSIS — N39.0 URINARY TRACT INFECTION WITHOUT HEMATURIA, SITE UNSPECIFIED: ICD-10-CM

## 2018-11-23 LAB
ALBUMIN SERPL-MCNC: 3.3 G/DL (ref 3.5–5)
ALBUMIN/GLOB SERPL: 0.6 {RATIO} (ref 1.1–2.2)
ALP SERPL-CCNC: 87 U/L (ref 45–117)
ALT SERPL-CCNC: 16 U/L (ref 12–78)
ANION GAP SERPL CALC-SCNC: 12 MMOL/L (ref 5–15)
APPEARANCE UR: ABNORMAL
APTT PPP: 28.2 SEC (ref 22.1–32)
AST SERPL-CCNC: 33 U/L (ref 15–37)
BACTERIA URNS QL MICRO: ABNORMAL /HPF
BASOPHILS # BLD: 0 K/UL (ref 0–0.1)
BASOPHILS NFR BLD: 0 % (ref 0–1)
BILIRUB SERPL-MCNC: 1 MG/DL (ref 0.2–1)
BILIRUB UR QL: NEGATIVE
BUN SERPL-MCNC: 8 MG/DL (ref 6–20)
BUN/CREAT SERPL: 13 (ref 12–20)
CALCIUM SERPL-MCNC: 9 MG/DL (ref 8.5–10.1)
CHLORIDE SERPL-SCNC: 99 MMOL/L (ref 97–108)
CO2 SERPL-SCNC: 28 MMOL/L (ref 21–32)
COLOR UR: ABNORMAL
COMMENT, HOLDF: NORMAL
CREAT SERPL-MCNC: 0.6 MG/DL (ref 0.55–1.02)
DIFFERENTIAL METHOD BLD: ABNORMAL
EOSINOPHIL # BLD: 0.5 K/UL (ref 0–0.4)
EOSINOPHIL NFR BLD: 8 % (ref 0–7)
EPITH CASTS URNS QL MICRO: ABNORMAL /LPF
ERYTHROCYTE [DISTWIDTH] IN BLOOD BY AUTOMATED COUNT: 22.7 % (ref 11.5–14.5)
GLOBULIN SER CALC-MCNC: 5.8 G/DL (ref 2–4)
GLUCOSE SERPL-MCNC: 55 MG/DL (ref 65–100)
GLUCOSE UR STRIP.AUTO-MCNC: NEGATIVE MG/DL
HCT VFR BLD AUTO: 36.8 % (ref 35–47)
HGB BLD-MCNC: 11 G/DL (ref 11.5–16)
HGB UR QL STRIP: NEGATIVE
IMM GRANULOCYTES # BLD: 0 K/UL (ref 0–0.04)
IMM GRANULOCYTES NFR BLD AUTO: 0 % (ref 0–0.5)
INR PPP: 1.1 (ref 0.9–1.1)
KETONES UR QL STRIP.AUTO: 15 MG/DL
LEUKOCYTE ESTERASE UR QL STRIP.AUTO: ABNORMAL
LYMPHOCYTES # BLD: 0.8 K/UL (ref 0.8–3.5)
LYMPHOCYTES NFR BLD: 13 % (ref 12–49)
MCH RBC QN AUTO: 25.3 PG (ref 26–34)
MCHC RBC AUTO-ENTMCNC: 29.9 G/DL (ref 30–36.5)
MCV RBC AUTO: 84.8 FL (ref 80–99)
MONOCYTES # BLD: 0.5 K/UL (ref 0–1)
MONOCYTES NFR BLD: 9 % (ref 5–13)
NEUTS SEG # BLD: 4 K/UL (ref 1.8–8)
NEUTS SEG NFR BLD: 70 % (ref 32–75)
NITRITE UR QL STRIP.AUTO: NEGATIVE
NRBC # BLD: 0 K/UL (ref 0–0.01)
NRBC BLD-RTO: 0 PER 100 WBC
PH UR STRIP: 6 [PH] (ref 5–8)
PLATELET # BLD AUTO: 342 K/UL (ref 150–400)
PLATELET COMMENTS,PCOM: ABNORMAL
PMV BLD AUTO: 11.9 FL (ref 8.9–12.9)
POTASSIUM SERPL-SCNC: 3.3 MMOL/L (ref 3.5–5.1)
PROT SERPL-MCNC: 9.1 G/DL (ref 6.4–8.2)
PROT UR STRIP-MCNC: NEGATIVE MG/DL
PROTHROMBIN TIME: 11.3 SEC (ref 9–11.1)
RBC # BLD AUTO: 4.34 M/UL (ref 3.8–5.2)
RBC #/AREA URNS HPF: ABNORMAL /HPF (ref 0–5)
RBC MORPH BLD: ABNORMAL
SAMPLES BEING HELD,HOLD: NORMAL
SODIUM SERPL-SCNC: 139 MMOL/L (ref 136–145)
SP GR UR REFRACTOMETRY: 1.01 (ref 1–1.03)
THERAPEUTIC RANGE,PTTT: NORMAL SECS (ref 58–77)
UA: UC IF INDICATED,UAUC: ABNORMAL
UROBILINOGEN UR QL STRIP.AUTO: 1 EU/DL (ref 0.2–1)
WBC # BLD AUTO: 5.8 K/UL (ref 3.6–11)
WBC URNS QL MICRO: ABNORMAL /HPF (ref 0–4)

## 2018-11-23 PROCEDURE — 71045 X-RAY EXAM CHEST 1 VIEW: CPT

## 2018-11-23 PROCEDURE — 85025 COMPLETE CBC W/AUTO DIFF WBC: CPT

## 2018-11-23 PROCEDURE — 80053 COMPREHEN METABOLIC PANEL: CPT

## 2018-11-23 PROCEDURE — 72100 X-RAY EXAM L-S SPINE 2/3 VWS: CPT

## 2018-11-23 PROCEDURE — 87086 URINE CULTURE/COLONY COUNT: CPT

## 2018-11-23 PROCEDURE — 99285 EMERGENCY DEPT VISIT HI MDM: CPT

## 2018-11-23 PROCEDURE — 85730 THROMBOPLASTIN TIME PARTIAL: CPT

## 2018-11-23 PROCEDURE — 74011250637 HC RX REV CODE- 250/637: Performed by: EMERGENCY MEDICINE

## 2018-11-23 PROCEDURE — 85610 PROTHROMBIN TIME: CPT

## 2018-11-23 PROCEDURE — 81001 URINALYSIS AUTO W/SCOPE: CPT

## 2018-11-23 PROCEDURE — 70450 CT HEAD/BRAIN W/O DYE: CPT

## 2018-11-23 PROCEDURE — 72170 X-RAY EXAM OF PELVIS: CPT

## 2018-11-23 PROCEDURE — 36415 COLL VENOUS BLD VENIPUNCTURE: CPT

## 2018-11-23 RX ORDER — CLOBETASOL PROPIONATE 0.5 MG/G
CREAM TOPICAL 2 TIMES DAILY
COMMUNITY
End: 2019-03-22

## 2018-11-23 RX ORDER — DULOXETIN HYDROCHLORIDE 20 MG/1
20 CAPSULE, DELAYED RELEASE ORAL DAILY
COMMUNITY
End: 2019-05-10

## 2018-11-23 RX ORDER — CEPHALEXIN 500 MG/1
500 CAPSULE ORAL 4 TIMES DAILY
Qty: 28 CAP | Refills: 0 | Status: SHIPPED | OUTPATIENT
Start: 2018-11-23 | End: 2018-11-30

## 2018-11-23 RX ORDER — CEPHALEXIN 500 MG/1
500 CAPSULE ORAL EVERY 12 HOURS
Status: DISCONTINUED | OUTPATIENT
Start: 2018-11-23 | End: 2018-11-23 | Stop reason: HOSPADM

## 2018-11-23 RX ADMIN — CEPHALEXIN 500 MG: 500 CAPSULE ORAL at 13:03

## 2018-11-23 NOTE — PROGRESS NOTES
Admission Medication Reconciliation: 
 
Information obtained from: Patient, Marleny Card Significant PMH/Disease States:  
Past Medical History:  
Diagnosis Date  Arthritis  Hypertension Chief Complaint for this Admission:  Fall Allergies:  Latex; Erythromycin; Neosporin [neomycin-bacitracin-polymyxin]; Neosporin [neomycin-bacitracnzn-polymyxnb]; and Penicillins Prior to Admission Medications:  
Prior to Admission Medications Prescriptions Last Dose Informant Patient Reported? Taking? DULoxetine (CYMBALTA) 20 mg capsule 11/22/2018 at Unknown time  Yes Yes Sig: Take 20 mg by mouth daily. clobetasol (TEMOVATE) 0.05 % topical cream   Yes Yes Sig: Apply  to affected area two (2) times a day. latanoprost (XALATAN) 0.005 % ophthalmic solution 11/16/2018 at Unknown time  Yes Yes Sig: Administer 1 Drop to both eyes nightly. Facility-Administered Medications: None Comments/Recommendations: Patient provided medication and allergy history. MSW aware of her home situation. Note: 1. Lives with , who can't participate in her care. 2. Has not taken her medications since she got out of the hospital, with exception of Duloxetine. States \"I just can't get up to take them. \" Added: 1. Clobetasol Deleted: 1. Amlodipine 2. Calcium carbonate (TUMS) 3. Vitamin D3 
4. Hydrochlorothiazide 5. Potassium chloride 6. Prednisone Thank you for allowing me to participate in the care of your patient. Kobi Crews PharmD, RN #[id number]

## 2018-11-23 NOTE — ED NOTES
TRANSFER - OUT REPORT: 
 
Verbal report given to Suad(name) on Chloe Gipson  being transferred to [facility] for routine progression of care Report consisted of patients Situation, Background, Assessment and  
Recommendations(SBAR). Information from the following report(s) SBAR was reviewed with the receiving nurse. Lines:  
Peripheral IV 11/23/18 Left Forearm (Active) Opportunity for questions and clarification was provided.    
 
Patient transported with: 
 
EMS

## 2018-11-23 NOTE — ED PROVIDER NOTES
80 y.o. female with past medical history significant for HTN who presents from home via EMS with chief complaint of back pain s/p GLF. Pt was admitted 10/26/18-10/31/18 after a fall due to generalized weakness. Pt was found to have a hyperdense mass in the sella/suprasellar space on head CT, which the patient wanted to follow up with neurosurgery at [facility]. Pt was also found to be hypoglycemic and hypokalemic, per note the patient had not ate or drank anything in 3 days after the fall. Pt was discharged to a rehab facility. Per EMS, the patient was recently discharged home from the rehab facility. EMS states the patient fell about 2 hours ago, and it took the patient that time to crawl to the phone and call EMS. Pt reports hitting her head, but only currently complains of 7/10 lower back pain. Pt denies any LOC, HA, or neck pain. Pt states she has not followed up with neurosurgery at [facility] yet. Pt reports a h.o of severe eczema. There are no other acute medical concerns at this time. Social hx: Nonsmoker; No EtOH use PCP: Giovanna Vanegas MD 
 
Note written by Cely Hernandez, as dictated by Sagrario Trent MD 6:37 AM 
 
 
The history is provided by the patient and medical records. No  was used. Past Medical History:  
Diagnosis Date  Arthritis  Hypertension Past Surgical History:  
Procedure Laterality Date  HX HYSTERECTOMY  HX TONSILLECTOMY History reviewed. No pertinent family history. Social History Socioeconomic History  Marital status:  Spouse name: Not on file  Number of children: Not on file  Years of education: Not on file  Highest education level: Not on file Social Needs  Financial resource strain: Not on file  Food insecurity - worry: Not on file  Food insecurity - inability: Not on file  Transportation needs - medical: Not on file  Transportation needs - non-medical: Not on file Occupational History  Not on file Tobacco Use  Smoking status: Never Smoker  Smokeless tobacco: Never Used Substance and Sexual Activity  Alcohol use: No  
  Frequency: Never  Drug use: No  
 Sexual activity: Not on file Other Topics Concern  Not on file Social History Narrative ** Merged History Encounter ** ALLERGIES: Latex; Erythromycin; Neosporin [neomycin-bacitracin-polymyxin]; Neosporin [neomycin-bacitracnzn-polymyxnb]; and Penicillins Review of Systems Musculoskeletal: Positive for back pain. Negative for neck pain. Skin: Positive for color change (Chronic). Neurological: Negative for syncope and headaches. All other systems reviewed and are negative. There were no vitals filed for this visit. Physical Exam  
Constitutional: She appears well-developed and well-nourished. She appears ill. No distress. Frail and elderly appearing. Chronically ill and very debilitated appearing. HENT:  
Head: Normocephalic and atraumatic. Nose: Nose normal.  
Mouth/Throat: Oropharynx is clear and moist. No oropharyngeal exudate. No signs of head trauma. Eyes: Conjunctivae and EOM are normal. Right eye exhibits no discharge. Left eye exhibits no discharge. No scleral icterus. Neck: Normal range of motion. Neck supple. No JVD present. No tracheal deviation present. No thyromegaly present. Cardiovascular: Normal rate, regular rhythm, normal heart sounds and intact distal pulses. Exam reveals no gallop and no friction rub. No murmur heard. Pulmonary/Chest: Effort normal and breath sounds normal. No stridor. No respiratory distress. She has no wheezes. She has no rales. She exhibits no tenderness. No chest wall tenderness. Abdominal: Bowel sounds are normal. She exhibits no distension and no mass. There is no tenderness. There is no rebound. No abdominal tenderness. Musculoskeletal: Normal range of motion.  She exhibits no edema or tenderness. No tenderness over upper extremities. Able to move hips without pain. Lymphadenopathy:  
  She has no cervical adenopathy. Neurological: She is alert. No cranial nerve deficit. Skin: Skin is warm and dry. She is not diaphoretic. No erythema. No pallor. Skin with chronic diffuse eczema. Psychiatric: She has a normal mood and affect. Her behavior is normal. Judgment and thought content normal.  
Nursing note and vitals reviewed. Note written by Cely Seth, as dictated by Marycruz Guzman MD 6:37 AM 
 
MDM Number of Diagnoses or Management Options Fall, initial encounter:  
Urinary tract infection without hematuria, site unspecified:  
  
Amount and/or Complexity of Data Reviewed Clinical lab tests: ordered and reviewed Tests in the radiology section of CPT®: ordered and reviewed Tests in the medicine section of CPT®: ordered and reviewed Discussion of test results with the performing providers: yes Obtain history from someone other than the patient: yes Procedures 6:50 AM 
Spoke with radiologist about the patient's head CT from her last visit, suspects pituitary adenoma and states aneurysm is unlikely. 8:24 AM 
Spoke with , Sina Carney, who will review the patient's records and see her. 10:05 AM 
Per Case Management, the patient is agreeable to [facility] a [facility]'s [facility] and will determine if they will accept the patient. 12:34 PM 
Patient has been accepted at [facility]. Awaiting med rec to be done by pharmacy, then transport will be arranged. All work up was essentially negative, patient may have a slight UTI. Pt has frequent falls due to being debilitated. Will discharge to a rehab facility.

## 2018-11-23 NOTE — ED NOTES
Patient reports eczema. Patient's skin is dry and scaly from scalp to toes. Patient has area which appears to be a crack in the skin on the left buttock. Skin protectant applied to buttocks and pannus. Lotion applied to patient from head to toe.

## 2018-11-23 NOTE — PROGRESS NOTES
Care Management  - Patient was in hospital 10-26-18 to 10-31-18 under observation status for recurrent falls. She was discharged to [facility]. She was there 10-31-18 to 11-14-18 . Spoke with patient in her ED room. Patient lives with her [address]. ([phone]). Per patient he is hard of hearing and it is difficult to speak with him on the phone. Patient has had multiple falls. Per CM notes from 2016, patient was having falls at that time and with possible diagnosis of early dementia. Patient said things have not been going well at home since she left [facility]. She said she is still learning to use the walker. Patient said she does her own sponge bath. Her  sometimes helps her with getting dressed and with cleaning herself up in the bathroom. Discussed possibility of going to SNF today when she leaves the ED. Patient is in agreement. Discussed local facilities. Patient would like to go to the closest facility to her home. Her first choice is [facility] and second is [facility]. Spoke with Royal Costello, liaison at [facility]. Patient was discharged from [facility] on 11-14-18. Sent referral via [address] link to [facility]. Spoke with Scott Vigil, admissions director at [facility] ([phone]). She will evaluate the referral and call this CM back. Await her return call. 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 Emergency contacts:  
 HusbandTesha Hutton Sr ([phone]) Sister-Loren Hernández Bjorn ([phone]) Reason for Admission:  fall RRAT Score:          10 Plan for utilizing home health:      Patient was open to [facility] prior to admission.  
                 
Likelihood of Readmission:  Moderate as she has a history of recurrent falls, which has been the reason for her last 3 ED visits. Transition of Care Plan:    SNF - [facility] Care Management Interventions Mode of Transport at Discharge: BLS Transition of Care Consult (CM Consult): SNF Partner SNF: Yes Discharge Durable Medical Equipment: No 
Occupational Therapy Consult: No 
Speech Therapy Consult: No 
Current Support Network: Lives with Spouse Plan discussed with Pt/Family/Caregiver: Yes Freedom of Choice Offered: Yes [employer] Information Provided?: No 
Discharge Location Discharge Placement: Skilled nursing facility [facility] Addendum: Spoke with ANNA John  at [facility]. She said when patient was discharged from their facility, patient declined home health even though it was recommended. The patient stated she did not like strangers in her home. Ms. Megan Moscoso called  and asked him to come to [facility] to participate in patient's discharge education.  did come to  patient, but he would not come in the hospital.  He sat in his vehicle for over an hour waiting for patient. Since  was not participating and patient declined home health services that were recommended,  called referral to [facility]. Ms. Megan Moscoso faxed the care management notes from [facility] and the H&P from [facility] to this . Will ask  to scan this into the patient's chart. Patient has been accepted to [facility]. CM arranged ambulance through [employer] with [facility]. Patient will be going to SNF at [facility]. CM notified patient that she had been accepted. Patient agreeable to going. She asked that CM notify her  and her sister. This CM called patient's Gregg Vargas ([phone]). Sister is in agreement with patient going to a facility. Called patients' . He said whatever the patient wanted, he would agree with.  
 
[facility]

## 2018-11-23 NOTE — PROGRESS NOTES
Day #1 of Cephalexin Indication:  UTI Current regimen:  500 mg QID Abx regimen: cephalexin monotherapy Recent Labs  
  18 
7204 WBC 5.8 CREA 0.60 BUN 8 Est CrCl: 51 ml/min Temp (24hrs), Av.8 °F (36.6 °C), Min:97.6 °F (36.4 °C), Max:97.9 °F (36.6 °C) Cultures:  
 urine: pending Plan: Change to 500 mg q12h per P&T approved renal dosing protocol. Cephalexin daily dose not to exceed 1 g/day if CrCl 30 - 59 mL/min.

## 2018-11-24 LAB
BACTERIA SPEC CULT: NORMAL
CC UR VC: NORMAL
SERVICE CMNT-IMP: NORMAL

## 2019-03-22 ENCOUNTER — HOSPITAL ENCOUNTER (EMERGENCY)
Age: 84
Discharge: HOME | End: 2019-03-22
Attending: EMERGENCY MEDICINE
Payer: MEDICARE

## 2019-03-22 VITALS
OXYGEN SATURATION: 100 % | HEART RATE: 94 BPM | DIASTOLIC BLOOD PRESSURE: 95 MMHG | TEMPERATURE: 98.5 F | SYSTOLIC BLOOD PRESSURE: 172 MMHG | RESPIRATION RATE: 15 BRPM

## 2019-03-22 DIAGNOSIS — L30.1 DYSHIDROTIC ECZEMA: Primary | ICD-10-CM

## 2019-03-22 PROCEDURE — 99283 EMERGENCY DEPT VISIT LOW MDM: CPT

## 2019-03-22 RX ORDER — TRIAMCINOLONE ACETONIDE 1 MG/G
OINTMENT TOPICAL 2 TIMES DAILY
Qty: 30 G | Refills: 2 | Status: SHIPPED | OUTPATIENT
Start: 2019-03-22 | End: 2019-05-10

## 2019-03-22 NOTE — ED PROVIDER NOTES
Mandy Delgado is a 80 y.o. female with hx significant for eczema, HTN, arthritis who presents ambulatory to ER with c/o severe itchy skin rash to bilateral hands, up her arms, bilat LE, neck, and ears x 2 months. Pt states it started out like her eczema but it has gotten worse than it ever has been before. States she has been using fluocionide cream without improvement. Notes rash keeps getting worse. It has gotten to the point that her skin on her hands keeps cracking. States the itching got so bad this morning she just couldn't handle it anymore. No fevers or any other complaints. She specifically denies any fevers, chills, nausea, vomiting, chest pain, shortness of breath, headache, diarrhea, abdominal pain, urinary/bowel changes, sweating or weight loss. PCP: Lakshmi Boyle MD  
PMHx significant for: Past Medical History: 
No date: Arthritis No date: Hypertension PSHx significant for: Past Surgical History: 
No date: HX HYSTERECTOMY No date: HX TONSILLECTOMY 
 
 
 
 -- Latex -- Hives 
 -- Erythromycin -- Hives 
 -- Neosporin (Neomycin-Bacitracin-Polymyxin) -- Rash 
 -- Neosporin (Neomycin-Bacitracnzn-Polymyxnb) -- Rash -- Penicillins -- Hives There are no other complaints, changes or physical findings at this time. Past Medical History:  
Diagnosis Date  Arthritis  Hypertension Past Surgical History:  
Procedure Laterality Date  HX HYSTERECTOMY  HX TONSILLECTOMY History reviewed. No pertinent family history. Social History Socioeconomic History  Marital status:  Spouse name: Not on file  Number of children: Not on file  Years of education: Not on file  Highest education level: Not on file Occupational History  Not on file Social Needs  Financial resource strain: Not on file  Food insecurity:  
  Worry: Not on file Inability: Not on file  Transportation needs:  
  Medical: Not on file Non-medical: Not on file Tobacco Use  Smoking status: Never Smoker  Smokeless tobacco: Never Used Substance and Sexual Activity  Alcohol use: No  
  Frequency: Never  Drug use: No  
 Sexual activity: Not on file Lifestyle  Physical activity:  
  Days per week: Not on file Minutes per session: Not on file  Stress: Not on file Relationships  Social connections:  
  Talks on phone: Not on file Gets together: Not on file Attends [employer] service: Not on file Active member of club or organization: Not on file Attends meetings of clubs or organizations: Not on file Relationship status: Not on file  Intimate partner violence:  
  Fear of current or ex partner: Not on file Emotionally abused: Not on file Physically abused: Not on file Forced sexual activity: Not on file Other Topics Concern  Not on file Social History Narrative ** Merged History Encounter ** ALLERGIES: Latex; Erythromycin; Neosporin [neomycin-bacitracin-polymyxin]; Neosporin [neomycin-bacitracnzn-polymyxnb]; and Penicillins Review of Systems Constitutional: Negative. HENT: Negative. Eyes: Negative. Respiratory: Negative. Cardiovascular: Negative. Gastrointestinal: Negative. Genitourinary: Negative. Musculoskeletal: Negative. Skin: Positive for rash. Neurological: Negative. Hematological: Negative. Psychiatric/Behavioral: Negative. All other systems reviewed and are negative. Vitals:  
 03/22/19 1133 03/22/19 1144 BP:  (!) 172/95 Pulse: 88 94 Resp:  15 Temp:  98.5 °F (36.9 °C) SpO2: 98% 100% Physical Exam  
Constitutional: She is oriented to person, place, and time. She appears well-developed and well-nourished. No distress. HENT:  
Head: Normocephalic and atraumatic. Neck: Normal range of motion. Cardiovascular: Intact distal pulses and normal pulses. Musculoskeletal: Normal range of motion. She exhibits no edema or tenderness. Neurological: She is alert and oriented to person, place, and time. She has normal strength. No sensory deficit. Skin: Skin is warm and dry. Rash (dyshidrotic eczema to bilat hands and UE, LE, neck, and pinna bilat) noted. She is not diaphoretic. No erythema. No pallor. No signs of secondary skin infection Psychiatric: She has a normal mood and affect. Her behavior is normal.  
Nursing note and vitals reviewed. MDM Number of Diagnoses or Management Options Dyshidrotic eczema:  
Diagnosis management comments: DDx: dyshidrotic eczema, atopic dermatitis, cellulitis Patient Progress Patient progress: stable Procedures 12:09 PM 
Pt has been reevaluated. There are no new complaints, changes, or physical findings at this time. Medications have been reviewed w/ pt and/or family. Pt and/or family's questions have been answered. Pt and/or family expressed good understanding of the dx/tx/rx and is in agreement with plan of care. Pt instructed and agreed to f/u w/ PCP and to return to ED upon further deterioration. Pt is ready for discharge. LABORATORY TESTS: 
No results found for this or any previous visit (from the past 12 hour(s)). IMAGING RESULTS: 
No orders to display No results found. MEDICATIONS GIVEN: 
Medications - No data to display IMPRESSION: 
1. Dyshidrotic eczema PLAN: 
1. Current Discharge Medication List  
  
START taking these medications Details  
triamcinolone acetonide (KENALOG) 0.1 % ointment Apply  to affected area two (2) times a day. use thin layer Qty: 30 g, Refills: 2 CONTINUE these medications which have NOT CHANGED Details DULoxetine (CYMBALTA) 20 mg capsule Take 20 mg by mouth daily. latanoprost (XALATAN) 0.005 % ophthalmic solution Administer 1 Drop to both eyes nightly. STOP taking these medications clobetasol (TEMOVATE) 0.05 % topical cream Comments:  
Reason for Stoppin.  
Follow-up Information Follow up With Specialties Details Why Contact Info Lazaro Salas MD Family Practice Schedule an appointment as soon as possible for a visit in 2 days  [address] [facility] [address] [place] [address] 
[phone] [address] [place] [address] EMERGENCY DEP Emergency Medicine  If symptoms worsen [address] [place] [id number] [phone] Return to ED if worse

## 2019-03-22 NOTE — ED TRIAGE NOTES
Pt arrives from home c/o sever ecemza flare-upl. Pt states her \"skin is itching and cracked real bad\". Pt arrives AOx4 in no apparent distress. Pt skin is cracked on hands, abdomen, back and legs.

## 2019-03-22 NOTE — PROGRESS NOTES
Care Management -  
15:07 Received request from patient registration and triage nurse that patient had been in lobby for some time. They have been calling her . He dropped her off and told triage nurse to call him when he needed to pick her up. They have been calling and there has been no answer. Met with patient in waiting area. Attempted  multiple times with no answer. Called sister- Soraida Boykin ([phone]) and left voicemail to call this CM. No other numbers in chart. Patient could not remember any additional numbers and did not have any with her. Continued to call throughout afternoon. Great niece-Jinny Elizalde ([phone]) came to hospital looking for patient. She gave additional contacts for patient. Adam Jasen ([phone]) is another great niece. Ms. Elizalde will take patient to get something to eat prior to taking her home. Niece also said that patient and her  will not let anyone into the home to help them. Patient needed to go to bathroom. CM wheeled her to bathroom. Patient then shared she only came to hospital to get a shower. Explained that ER did not do that any longer. Gave this information to her niece. Encouraged patient to open up to family regarding her need for assistance.    
 
SERAFIN Hahn

## 2019-03-22 NOTE — DISCHARGE INSTRUCTIONS
We hope that we have addressed all of your medical concerns. The examination and treatment you received in the Emergency Department were for an emergent problem and were not intended as complete care. It is important that you follow up with your healthcare provider(s) for ongoing care. If your symptoms worsen or do not improve as expected, and you are unable to reach your usual health care provider(s), you should return to the Emergency Department. Today's healthcare is undergoing tremendous change, and patient satisfaction surveys are one of the many tools to assess the quality of medical care. You may receive a survey from the [employer] regarding your experience in the Emergency Department. I hope that your experience has been completely positive, particularly the medical care that I provided. As such, please participate in the survey; anything less than excellent does not meet my expectations or intentions. [facility] and [address] [place] [address] participate in nationally recognized quality of care measures. If your blood pressure is greater than 120/80, as reported below, we urge that you seek medical care to address the potential of high blood pressure, commonly known as hypertension. Hypertension can be hereditary or can be caused by certain medical conditions, pain, stress, or \"white coat syndrome. \"       Please make an appointment with your health care provider(s) for follow up of your Emergency Department visit. VITALS:   Patient Vitals for the past 8 hrs:   Temp Pulse Resp BP SpO2   03/22/19 1144 98.5 °F (36.9 °C) 94 15 (!) 172/95 100 %   03/22/19 1133 -- 88 -- -- 98 %          Thank you for allowing us to provide you with medical care today. We realize that you have many choices for your emergency care needs. Please choose us in the future for any continued health care needs. Von Humphrey, FAIZA Hamiltonronaldo [facility] 70: [phone]            No results found for this or any previous visit (from the past 24 hour(s)). No results found. Patient Education        Atopic Dermatitis: Care Instructions  Your Care Instructions  Atopic dermatitis (also called eczema) is a skin problem that causes intense itching and a red, raised rash. In severe cases, the rash develops clear fluid-filled blisters. The rash is not contagious. People with this condition seem to have very sensitive immune systems that are likely to react to things that cause allergies. The immune system is the body's way of fighting infection. There is no cure for atopic dermatitis, but you may be able to control it with care at home. Follow-up care is a key part of your treatment and safety. Be sure to make and go to all appointments, and call your doctor if you are having problems. It's also a good idea to know your test results and keep a list of the medicines you take. How can you care for yourself at home? · Use moisturizer at least twice a day. · If your doctor prescribes a cream, use it as directed. If your doctor prescribes other medicine, take it exactly as directed. · Wash the affected area with water only. Soap can make dryness and itching worse. Pat dry. · Apply a moisturizer after bathing. Use a cream such as Lubriderm, Moisturel, or Cetaphil that does not irritate the skin or cause a rash. Apply the cream while your skin is still damp after lightly drying with a towel. · Use cold, wet cloths to reduce itching. · Keep cool, and stay out of the sun. · If itching affects your normal activities, an over-the-counter antihistamine, such as diphenhydramine (Benadryl) or loratadine (Claritin) may help. Read and follow all instructions on the label. When should you call for help?   Call your doctor now or seek immediate medical care if:    · Your rash gets worse and you have a fever.     · You have new blisters or bruises, or the rash spreads and looks like a sunburn.     · You have signs of infection, such as:  ? Increased pain, swelling, warmth, or redness. ? Red streaks leading from the rash. ? Pus draining from the rash. ? A fever.     · You have crusting or oozing sores.     · You have joint aches or body aches along with your rash.    Watch closely for changes in your health, and be sure to contact your doctor if:    · Your rash does not clear up after 2 to 3 weeks of home treatment.     · Itching interferes with your sleep or daily activities. Where can you learn more? Go to http://miguel-harmony.info/. Enter L605 in the search box to learn more about \"Atopic Dermatitis: Care Instructions. \"  Current as of: April 17, 2018  Content Version: 11.9  © 6270-0438 [employer]. Care instructions adapted under license by [employer] (which disclaims liability or warranty for this information). If you have questions about a medical condition or this instruction, always ask your healthcare professional. [address] any warranty or liability for your use of this information.

## 2019-05-10 ENCOUNTER — APPOINTMENT (OUTPATIENT)
Dept: CT IMAGING | Age: 84
DRG: 640 | End: 2019-05-10
Attending: FAMILY MEDICINE
Payer: MEDICARE

## 2019-05-10 ENCOUNTER — APPOINTMENT (OUTPATIENT)
Dept: GENERAL RADIOLOGY | Age: 84
DRG: 640 | End: 2019-05-10
Attending: FAMILY MEDICINE
Payer: MEDICARE

## 2019-05-10 ENCOUNTER — APPOINTMENT (OUTPATIENT)
Dept: GENERAL RADIOLOGY | Age: 84
DRG: 640 | End: 2019-05-10
Attending: EMERGENCY MEDICINE
Payer: MEDICARE

## 2019-05-10 ENCOUNTER — HOSPITAL ENCOUNTER (INPATIENT)
Age: 84
LOS: 11 days | Discharge: NURSING, REHAB OR LONG-TERM CARE | DRG: 640 | End: 2019-05-22
Attending: EMERGENCY MEDICINE | Admitting: HOSPITALIST
Payer: MEDICARE

## 2019-05-10 DIAGNOSIS — R53.1 GENERALIZED WEAKNESS: ICD-10-CM

## 2019-05-10 DIAGNOSIS — E16.2 HYPOGLYCEMIA: Primary | ICD-10-CM

## 2019-05-10 LAB
ALBUMIN SERPL-MCNC: 2.4 G/DL (ref 3.5–5)
ALBUMIN/GLOB SERPL: 0.5 {RATIO} (ref 1.1–2.2)
ALP SERPL-CCNC: 269 U/L (ref 45–117)
ALT SERPL-CCNC: 20 U/L (ref 12–78)
ANION GAP SERPL CALC-SCNC: 8 MMOL/L (ref 5–15)
APPEARANCE UR: CLEAR
AST SERPL-CCNC: 27 U/L (ref 15–37)
ATRIAL RATE: 66 BPM
BASOPHILS # BLD: 0.1 K/UL (ref 0–0.1)
BASOPHILS NFR BLD: 1 % (ref 0–1)
BILIRUB SERPL-MCNC: 0.7 MG/DL (ref 0.2–1)
BILIRUB UR QL CFM: NEGATIVE
BUN SERPL-MCNC: 13 MG/DL (ref 6–20)
BUN/CREAT SERPL: 17 (ref 12–20)
CALCIUM SERPL-MCNC: 8.8 MG/DL (ref 8.5–10.1)
CALCULATED P AXIS, ECG09: 106 DEGREES
CALCULATED R AXIS, ECG10: -58 DEGREES
CALCULATED T AXIS, ECG11: 55 DEGREES
CHLORIDE SERPL-SCNC: 119 MMOL/L (ref 97–108)
CO2 SERPL-SCNC: 24 MMOL/L (ref 21–32)
COLOR UR: ABNORMAL
COMMENT, HOLDF: NORMAL
CREAT SERPL-MCNC: 0.77 MG/DL (ref 0.55–1.02)
DIAGNOSIS, 93000: NORMAL
DIFFERENTIAL METHOD BLD: ABNORMAL
EOSINOPHIL # BLD: 0.3 K/UL (ref 0–0.4)
EOSINOPHIL NFR BLD: 5 % (ref 0–7)
ERYTHROCYTE [DISTWIDTH] IN BLOOD BY AUTOMATED COUNT: 21.4 % (ref 11.5–14.5)
GLOBULIN SER CALC-MCNC: 5.1 G/DL (ref 2–4)
GLUCOSE BLD STRIP.AUTO-MCNC: 49 MG/DL (ref 65–100)
GLUCOSE BLD STRIP.AUTO-MCNC: 54 MG/DL (ref 65–100)
GLUCOSE BLD STRIP.AUTO-MCNC: 77 MG/DL (ref 65–100)
GLUCOSE BLD STRIP.AUTO-MCNC: 81 MG/DL (ref 65–100)
GLUCOSE SERPL-MCNC: 22 MG/DL (ref 65–100)
GLUCOSE UR STRIP.AUTO-MCNC: NEGATIVE MG/DL
HCT VFR BLD AUTO: 40.2 % (ref 35–47)
HGB BLD-MCNC: 11.6 G/DL (ref 11.5–16)
HGB UR QL STRIP: NEGATIVE
IMM GRANULOCYTES # BLD AUTO: 0 K/UL (ref 0–0.04)
IMM GRANULOCYTES NFR BLD AUTO: 0 % (ref 0–0.5)
KETONES UR QL STRIP.AUTO: ABNORMAL MG/DL
LACTATE BLD-SCNC: 1.61 MMOL/L (ref 0.4–2)
LEUKOCYTE ESTERASE UR QL STRIP.AUTO: NEGATIVE
LYMPHOCYTES # BLD: 1.1 K/UL (ref 0.8–3.5)
LYMPHOCYTES NFR BLD: 17 % (ref 12–49)
MAGNESIUM SERPL-MCNC: 2.6 MG/DL (ref 1.6–2.4)
MCH RBC QN AUTO: 25.8 PG (ref 26–34)
MCHC RBC AUTO-ENTMCNC: 28.9 G/DL (ref 30–36.5)
MCV RBC AUTO: 89.5 FL (ref 80–99)
MONOCYTES # BLD: 0.7 K/UL (ref 0–1)
MONOCYTES NFR BLD: 10 % (ref 5–13)
NEUTS SEG # BLD: 4.3 K/UL (ref 1.8–8)
NEUTS SEG NFR BLD: 67 % (ref 32–75)
NITRITE UR QL STRIP.AUTO: NEGATIVE
NRBC # BLD: 0 K/UL (ref 0–0.01)
NRBC BLD-RTO: 0 PER 100 WBC
P-R INTERVAL, ECG05: 156 MS
PH UR STRIP: 5 [PH] (ref 5–8)
PLATELET # BLD AUTO: 266 K/UL (ref 150–400)
PMV BLD AUTO: 12.9 FL (ref 8.9–12.9)
POTASSIUM SERPL-SCNC: 4.6 MMOL/L (ref 3.5–5.1)
PROT SERPL-MCNC: 7.5 G/DL (ref 6.4–8.2)
PROT UR STRIP-MCNC: NEGATIVE MG/DL
Q-T INTERVAL, ECG07: 444 MS
QRS DURATION, ECG06: 72 MS
QTC CALCULATION (BEZET), ECG08: 465 MS
RBC # BLD AUTO: 4.49 M/UL (ref 3.8–5.2)
RBC MORPH BLD: ABNORMAL
SAMPLES BEING HELD,HOLD: NORMAL
SERVICE CMNT-IMP: ABNORMAL
SERVICE CMNT-IMP: ABNORMAL
SERVICE CMNT-IMP: NORMAL
SERVICE CMNT-IMP: NORMAL
SODIUM SERPL-SCNC: 151 MMOL/L (ref 136–145)
SP GR UR REFRACTOMETRY: 1.02 (ref 1–1.03)
TROPONIN I SERPL-MCNC: <0.05 NG/ML
UROBILINOGEN UR QL STRIP.AUTO: 1 EU/DL (ref 0.2–1)
VENTRICULAR RATE, ECG03: 66 BPM
WBC # BLD AUTO: 6.5 K/UL (ref 3.6–11)

## 2019-05-10 PROCEDURE — 80053 COMPREHEN METABOLIC PANEL: CPT

## 2019-05-10 PROCEDURE — 36415 COLL VENOUS BLD VENIPUNCTURE: CPT

## 2019-05-10 PROCEDURE — 82962 GLUCOSE BLOOD TEST: CPT

## 2019-05-10 PROCEDURE — 93005 ELECTROCARDIOGRAM TRACING: CPT

## 2019-05-10 PROCEDURE — 72125 CT NECK SPINE W/O DYE: CPT

## 2019-05-10 PROCEDURE — 99285 EMERGENCY DEPT VISIT HI MDM: CPT

## 2019-05-10 PROCEDURE — 71045 X-RAY EXAM CHEST 1 VIEW: CPT

## 2019-05-10 PROCEDURE — 73590 X-RAY EXAM OF LOWER LEG: CPT

## 2019-05-10 PROCEDURE — 81003 URINALYSIS AUTO W/O SCOPE: CPT

## 2019-05-10 PROCEDURE — 83735 ASSAY OF MAGNESIUM: CPT

## 2019-05-10 PROCEDURE — 84484 ASSAY OF TROPONIN QUANT: CPT

## 2019-05-10 PROCEDURE — 99218 HC RM OBSERVATION: CPT

## 2019-05-10 PROCEDURE — 77030013079 HC BLNKT BAIR HGGR 3M -A

## 2019-05-10 PROCEDURE — 51701 INSERT BLADDER CATHETER: CPT

## 2019-05-10 PROCEDURE — 85025 COMPLETE CBC W/AUTO DIFF WBC: CPT

## 2019-05-10 PROCEDURE — 70450 CT HEAD/BRAIN W/O DYE: CPT

## 2019-05-10 PROCEDURE — 74011000258 HC RX REV CODE- 258: Performed by: FAMILY MEDICINE

## 2019-05-10 PROCEDURE — 74011250637 HC RX REV CODE- 250/637: Performed by: FAMILY MEDICINE

## 2019-05-10 PROCEDURE — 72170 X-RAY EXAM OF PELVIS: CPT

## 2019-05-10 PROCEDURE — 77030005563 HC CATH URETH INT MMGH -A

## 2019-05-10 PROCEDURE — 83605 ASSAY OF LACTIC ACID: CPT

## 2019-05-10 RX ORDER — MAGNESIUM SULFATE 100 %
4 CRYSTALS MISCELLANEOUS AS NEEDED
Status: DISCONTINUED | OUTPATIENT
Start: 2019-05-10 | End: 2019-05-15 | Stop reason: SDUPTHER

## 2019-05-10 RX ORDER — LATANOPROST 50 UG/ML
1 SOLUTION/ DROPS OPHTHALMIC
Status: CANCELLED | OUTPATIENT
Start: 2019-05-10

## 2019-05-10 RX ORDER — DEXTROSE 50 % IN WATER (D50W) INTRAVENOUS SYRINGE
12.5-25 AS NEEDED
Status: DISCONTINUED | OUTPATIENT
Start: 2019-05-10 | End: 2019-05-15 | Stop reason: SDUPTHER

## 2019-05-10 RX ORDER — DULOXETIN HYDROCHLORIDE 20 MG/1
20 CAPSULE, DELAYED RELEASE ORAL DAILY
Status: CANCELLED | OUTPATIENT
Start: 2019-05-11

## 2019-05-10 RX ORDER — DEXTROSE MONOHYDRATE AND SODIUM CHLORIDE 5; .45 G/100ML; G/100ML
100 INJECTION, SOLUTION INTRAVENOUS CONTINUOUS
Status: DISCONTINUED | OUTPATIENT
Start: 2019-05-10 | End: 2019-05-11

## 2019-05-10 RX ORDER — DEXTROSE MONOHYDRATE AND SODIUM CHLORIDE 5; .9 G/100ML; G/100ML
75 INJECTION, SOLUTION INTRAVENOUS CONTINUOUS
Status: DISCONTINUED | OUTPATIENT
Start: 2019-05-10 | End: 2019-05-10

## 2019-05-10 RX ADMIN — DEXTROSE MONOHYDRATE AND SODIUM CHLORIDE 100 ML/HR: 5; .45 INJECTION, SOLUTION INTRAVENOUS at 22:55

## 2019-05-10 RX ADMIN — Medication 16 G: at 22:11

## 2019-05-10 NOTE — ED TRIAGE NOTES
Pt arrives via EMS from home d/t decrease in appetite and just generalized malaise. PT states she's been feeling like that for a month. Pt states she also hasn't been able to sleep at all. Denies vomiting or diarrhea.

## 2019-05-10 NOTE — ED PROVIDER NOTES
80 y.o. female with past medical history significant for arthritis and HTN who presents from home via EMS with chief complaint of decreased appetite. Patient reports she has \"not been feeling good for months\". She c/o chills, decreased appetite, intermittent shortness of breath, and \"not sleeping well\". She notes history of eczema and asthma. Specifically denies nausea, vomiting, and any other pain or symptoms. There are no other acute medical concerns at this time. Full history, physical exam, and ROS unable to be obtained due to:  Pt is poor historian. Social hx: Never tobacco smoker; Denies EtOH use; Denies illicit drug use PCP: Thong Cisneros MD 
 
Note written by Cely Walters, as dictated by Violette Finnegan MD 4:18 PM 
 
The history is provided by the patient. No  was used. Past Medical History:  
Diagnosis Date  Arthritis  Hypertension Past Surgical History:  
Procedure Laterality Date  HX HYSTERECTOMY  HX TONSILLECTOMY No family history on file. Social History Socioeconomic History  Marital status:  Spouse name: Not on file  Number of children: Not on file  Years of education: Not on file  Highest education level: Not on file Occupational History  Not on file Social Needs  Financial resource strain: Not on file  Food insecurity:  
  Worry: Not on file Inability: Not on file  Transportation needs:  
  Medical: Not on file Non-medical: Not on file Tobacco Use  Smoking status: Never Smoker  Smokeless tobacco: Never Used Substance and Sexual Activity  Alcohol use: No  
  Frequency: Never  Drug use: No  
 Sexual activity: Not on file Lifestyle  Physical activity:  
  Days per week: Not on file Minutes per session: Not on file  Stress: Not on file Relationships  Social connections:  
  Talks on phone: Not on file Gets together: Not on file Attends [employer] service: Not on file Active member of club or organization: Not on file Attends meetings of clubs or organizations: Not on file Relationship status: Not on file  Intimate partner violence:  
  Fear of current or ex partner: Not on file Emotionally abused: Not on file Physically abused: Not on file Forced sexual activity: Not on file Other Topics Concern  Not on file Social History Narrative ** Merged History Encounter ** ALLERGIES: Latex; Erythromycin; Neosporin [neomycin-bacitracin-polymyxin]; Neosporin [neomycin-bacitracnzn-polymyxnb]; and Penicillins Review of Systems Unable to perform ROS: Other (Pt is poor historian) Vitals:  
 05/10/19 1636 05/10/19 1900 05/10/19 2000 05/10/19 2030 BP: 171/78 136/61 152/61 137/45 Pulse: 66 Resp: 15 Temp: 98.9 °F (37.2 °C) SpO2: 100% 100% 100% 100% Physical Exam  
Constitutional: She is oriented to person, place, and time. She appears well-developed and well-nourished. HENT:  
Head: Normocephalic and atraumatic. Eyes: Pupils are equal, round, and reactive to light. No scleral icterus. Neck: Normal range of motion. Neck supple. Cardiovascular: Normal rate and regular rhythm. Pulmonary/Chest: Effort normal and breath sounds normal.  
Abdominal: Soft. She exhibits no distension. There is no tenderness. Musculoskeletal: She exhibits edema. Trace pitting edema Neurological: She is alert and oriented to person, place, and time. Skin: Skin is warm and dry. Capillary refill takes less than 2 seconds. No rash noted. No erythema. Skin is dried and crusted. Chronic flaking and severe eczema. Psychiatric: She has a normal mood and affect. Her behavior is normal.  
Nursing note and vitals reviewed. Note written by Cely Paul, as dictated by Meliton Garcia MD 4:18 PM 
 
MDM Procedures ED EKG interpretation: 1637 Rhythm: normal sinus rhythm. Rate (approx.): 66 bpm; A lot of artifact; No STEMI; No ectopy. Note written by Cely Barr, as dictated by aDe Ross MD 4:54 PM 
 
 
 
Hospitalist Cain for Admission 9:18 PM 
 
ED Room Number: GX14/21 Patient Name and age: Nguyen Hodgson 80 y.o.  female Working Diagnosis: 1. Hypoglycemia 2. Generalized weakness Readmission: no 
Isolation Requirements:  no 
Recommended Level of Care:  med/surg Code Status:  Full Other:  Has very poor hygiene, has been unable to care for herself, won't let nursing or any aids into the house, APS involved

## 2019-05-10 NOTE — PROGRESS NOTES
Date of previous inpatient admission/ ED visit? 3/22/19 ED visit (skin problem) What brought the patient back to ED? Patient presents to the ED w/ chief compliant of lethargy Did patient decline recommended services during last admission/ ED visit (if yes, what)? No 
 
Has patient seen a provider since their last inpatient admission/ED visit (if yes, when)? Previous Physician Dr. Celine Renteria (retired).  makes reference to [address] CM Interventions: 
From previous inpatient admission/ED visit: Assessment From current inpatient admission/ED visit:Assessment SSED/CM consult received and appreciated. EMR reviewed. Case discussed w/ Juni Castellanos regarding patient LOC needs and if home condemned. CM unable to locate EMS report. Met w/patient and introduced to role of CM. Noted Nursing attempting to obtain labs at time of visit. Patient is alert verified home address and informed lives home w/ spouse Israel Waldron Sr.Home is 1 story (6 steps). RW used for ambulation patient reports falling out of a chair last year and went to rehab. Noted emergency contacts sister Carl Ceballos [phone] / [employer] [phone]/ and Raquel Jose Manuel Mao [phone]. Informed  CM would communicate w/ spouse for additional information. Call placed to  - introduced self. CM asked spouse to provide history regarding LOC.  states patient walks \" a little bit\" and has been sick since October/Novermber 2018. This writer asked if home had been condemned?  states \"[facility] CM verbalized the need to know if he was safe in the home and its status. Spouse states patient is able to bathe herself \"because I don't bath her. \" According to spouse patient is able to cook and he drives and grocery shops \" I've been doing this for 54 years. \"  restating CM needs to ask patient about her activities.  Asked spouse if patient d/cd this evening would he be able to  from ED states would contact rosalina Vergara. CM placed call to [facility] - introduced to self. Great rosalina verbalizes she is stranded in [address] (had left car lights on). Niece began to cry and verbalizes did not want patient to be alone in the ED and wanted to provide support for her \" I didn't want her to worry. I didn't tell her I was leaving for a meeting. \" 
 
History obtained couple does not trust anyone and only allows her into the home. Niece describes Uncle as being stubborn. Sean Vergara is an Artist and travels expressed reached out to patient in October and was informed was at [facility] prior to that had last seen Spring 2018. Rosalina expressed appearance of her Dilcia Lowery was very different in October skin condition had changed appearance. Kiaralouieor Jai refers to patient as \"Aunt Sweetie\" and both have talks. Rosalina states home is not condemned \" but it should be. \" Fredyor Jai acknowledges patient can't properly take care of herself. Informed  is expecting patient to prepare 3 meals /day however patient is unable to . Niece bring meals for couple however  will not eat them also makes sure patient has bite sized /packed items to eat. At one time patient had been sleeping in kitchen chair questions how much sleep patient gets at night. No AMD and No POA appointed . Patient and spouse make their decisions. Sean eVrgara expressed loss of son a few years ago has caused depression. Patient has adopted 2 sons and later were readopted. Alternative contact is sister Rios Bias however has been limited w/ assisting patient since takes care of 3 grandchildren. Jinny visits 3X/week and calls 2X/day. SBAR provided to [facility]. Updates provided to  requesting [facility] to follow. VA Medicare A/B and [employer] are insurance providers. [facility] is local pharmacy. Care Management Interventions PCP Verified by CM:  Yes 
Palliative Care Criteria Met (RRAT>21 & CHF Dx)?: No 
 Transition of Care Consult (CM Consult): Discharge Planning MyChart Signup: No 
Discharge Durable Medical Equipment: No 
Health Maintenance Reviewed: Yes Physical Therapy Consult: No 
Occupational Therapy Consult: No 
Speech Therapy Consult: No 
Current Support Network: Lives with Spouse, Own Home Confirm Follow Up Transport: (TBD) Plan discussed with Pt/Family/Caregiver: Yes [employer] Information Provided?: No 
Discharge Location Discharge Placement: (TBD)

## 2019-05-11 PROBLEM — E87.0 HYPERNATREMIA: Status: ACTIVE | Noted: 2019-05-11

## 2019-05-11 LAB
ANION GAP SERPL CALC-SCNC: 8 MMOL/L (ref 5–15)
BASOPHILS # BLD: 0 K/UL (ref 0–0.1)
BASOPHILS NFR BLD: 0 % (ref 0–1)
BUN SERPL-MCNC: 11 MG/DL (ref 6–20)
BUN/CREAT SERPL: 13 (ref 12–20)
CALCIUM SERPL-MCNC: 7.8 MG/DL (ref 8.5–10.1)
CHLORIDE SERPL-SCNC: 117 MMOL/L (ref 97–108)
CO2 SERPL-SCNC: 25 MMOL/L (ref 21–32)
CREAT SERPL-MCNC: 0.84 MG/DL (ref 0.55–1.02)
DIFFERENTIAL METHOD BLD: ABNORMAL
EOSINOPHIL # BLD: 0.3 K/UL (ref 0–0.4)
EOSINOPHIL NFR BLD: 5 % (ref 0–7)
ERYTHROCYTE [DISTWIDTH] IN BLOOD BY AUTOMATED COUNT: 19.9 % (ref 11.5–14.5)
GLUCOSE BLD STRIP.AUTO-MCNC: 100 MG/DL (ref 65–100)
GLUCOSE BLD STRIP.AUTO-MCNC: 167 MG/DL (ref 65–100)
GLUCOSE BLD STRIP.AUTO-MCNC: 53 MG/DL (ref 65–100)
GLUCOSE BLD STRIP.AUTO-MCNC: 65 MG/DL (ref 65–100)
GLUCOSE BLD STRIP.AUTO-MCNC: 67 MG/DL (ref 65–100)
GLUCOSE BLD STRIP.AUTO-MCNC: 72 MG/DL (ref 65–100)
GLUCOSE BLD STRIP.AUTO-MCNC: 79 MG/DL (ref 65–100)
GLUCOSE BLD STRIP.AUTO-MCNC: 84 MG/DL (ref 65–100)
GLUCOSE BLD STRIP.AUTO-MCNC: 89 MG/DL (ref 65–100)
GLUCOSE BLD STRIP.AUTO-MCNC: 96 MG/DL (ref 65–100)
GLUCOSE BLD STRIP.AUTO-MCNC: 97 MG/DL (ref 65–100)
GLUCOSE SERPL-MCNC: 65 MG/DL (ref 65–100)
HCT VFR BLD AUTO: 30.3 % (ref 35–47)
HGB BLD-MCNC: 8.7 G/DL (ref 11.5–16)
IMM GRANULOCYTES # BLD AUTO: 0 K/UL (ref 0–0.04)
IMM GRANULOCYTES NFR BLD AUTO: 0 % (ref 0–0.5)
LYMPHOCYTES # BLD: 1.1 K/UL (ref 0.8–3.5)
LYMPHOCYTES NFR BLD: 19 % (ref 12–49)
MCH RBC QN AUTO: 26 PG (ref 26–34)
MCHC RBC AUTO-ENTMCNC: 28.7 G/DL (ref 30–36.5)
MCV RBC AUTO: 90.7 FL (ref 80–99)
MONOCYTES # BLD: 0.6 K/UL (ref 0–1)
MONOCYTES NFR BLD: 11 % (ref 5–13)
NEUTS SEG # BLD: 3.8 K/UL (ref 1.8–8)
NEUTS SEG NFR BLD: 65 % (ref 32–75)
NRBC # BLD: 0 K/UL (ref 0–0.01)
NRBC BLD-RTO: 0 PER 100 WBC
PLATELET # BLD AUTO: 208 K/UL (ref 150–400)
PMV BLD AUTO: 11.7 FL (ref 8.9–12.9)
POTASSIUM SERPL-SCNC: 3.6 MMOL/L (ref 3.5–5.1)
RBC # BLD AUTO: 3.34 M/UL (ref 3.8–5.2)
RBC MORPH BLD: ABNORMAL
SERVICE CMNT-IMP: ABNORMAL
SERVICE CMNT-IMP: ABNORMAL
SERVICE CMNT-IMP: NORMAL
SODIUM SERPL-SCNC: 150 MMOL/L (ref 136–145)
TROPONIN I SERPL-MCNC: <0.05 NG/ML
WBC # BLD AUTO: 5.8 K/UL (ref 3.6–11)

## 2019-05-11 PROCEDURE — 74011250636 HC RX REV CODE- 250/636: Performed by: FAMILY MEDICINE

## 2019-05-11 PROCEDURE — 74011000250 HC RX REV CODE- 250: Performed by: FAMILY MEDICINE

## 2019-05-11 PROCEDURE — 74011250636 HC RX REV CODE- 250/636: Performed by: HOSPITALIST

## 2019-05-11 PROCEDURE — 84484 ASSAY OF TROPONIN QUANT: CPT

## 2019-05-11 PROCEDURE — 99218 HC RM OBSERVATION: CPT

## 2019-05-11 PROCEDURE — 36415 COLL VENOUS BLD VENIPUNCTURE: CPT

## 2019-05-11 PROCEDURE — 97162 PT EVAL MOD COMPLEX 30 MIN: CPT

## 2019-05-11 PROCEDURE — 80048 BASIC METABOLIC PNL TOTAL CA: CPT

## 2019-05-11 PROCEDURE — 85025 COMPLETE CBC W/AUTO DIFF WBC: CPT

## 2019-05-11 PROCEDURE — 51798 US URINE CAPACITY MEASURE: CPT

## 2019-05-11 PROCEDURE — 65270000029 HC RM PRIVATE

## 2019-05-11 PROCEDURE — 82962 GLUCOSE BLOOD TEST: CPT

## 2019-05-11 RX ORDER — DEXTROSE 50 % IN WATER (D50W) INTRAVENOUS SYRINGE
12.5-25 AS NEEDED
Status: DISCONTINUED | OUTPATIENT
Start: 2019-05-11 | End: 2019-05-22 | Stop reason: HOSPADM

## 2019-05-11 RX ORDER — ACETAMINOPHEN 325 MG/1
650 TABLET ORAL
Status: DISCONTINUED | OUTPATIENT
Start: 2019-05-11 | End: 2019-05-22 | Stop reason: HOSPADM

## 2019-05-11 RX ORDER — SODIUM CHLORIDE 0.9 % (FLUSH) 0.9 %
5-40 SYRINGE (ML) INJECTION AS NEEDED
Status: DISCONTINUED | OUTPATIENT
Start: 2019-05-11 | End: 2019-05-22 | Stop reason: HOSPADM

## 2019-05-11 RX ORDER — SODIUM CHLORIDE 0.9 % (FLUSH) 0.9 %
5-40 SYRINGE (ML) INJECTION EVERY 8 HOURS
Status: DISCONTINUED | OUTPATIENT
Start: 2019-05-11 | End: 2019-05-22 | Stop reason: HOSPADM

## 2019-05-11 RX ORDER — MAGNESIUM SULFATE 100 %
4 CRYSTALS MISCELLANEOUS AS NEEDED
Status: DISCONTINUED | OUTPATIENT
Start: 2019-05-11 | End: 2019-05-22 | Stop reason: HOSPADM

## 2019-05-11 RX ORDER — HEPARIN SODIUM 5000 [USP'U]/ML
5000 INJECTION, SOLUTION INTRAVENOUS; SUBCUTANEOUS EVERY 8 HOURS
Status: DISCONTINUED | OUTPATIENT
Start: 2019-05-11 | End: 2019-05-22 | Stop reason: HOSPADM

## 2019-05-11 RX ORDER — DEXTROSE MONOHYDRATE 50 MG/ML
75 INJECTION, SOLUTION INTRAVENOUS CONTINUOUS
Status: DISCONTINUED | OUTPATIENT
Start: 2019-05-11 | End: 2019-05-13

## 2019-05-11 RX ADMIN — DEXTROSE MONOHYDRATE 12.5 G: 500 INJECTION PARENTERAL at 23:52

## 2019-05-11 RX ADMIN — DEXTROSE MONOHYDRATE 100 ML/HR: 5 INJECTION, SOLUTION INTRAVENOUS at 08:01

## 2019-05-11 RX ADMIN — Medication 10 ML: at 14:00

## 2019-05-11 RX ADMIN — HEPARIN SODIUM 5000 UNITS: 5000 INJECTION INTRAVENOUS; SUBCUTANEOUS at 18:11

## 2019-05-11 RX ADMIN — DEXTROSE MONOHYDRATE 25 G: 500 INJECTION PARENTERAL at 06:13

## 2019-05-11 RX ADMIN — Medication 10 ML: at 06:45

## 2019-05-11 RX ADMIN — HEPARIN SODIUM 5000 UNITS: 5000 INJECTION INTRAVENOUS; SUBCUTANEOUS at 10:02

## 2019-05-11 RX ADMIN — HEPARIN SODIUM 5000 UNITS: 5000 INJECTION INTRAVENOUS; SUBCUTANEOUS at 03:33

## 2019-05-11 NOTE — ROUTINE PROCESS
TRANSFER - OUT REPORT: 
 
Verbal report given to Malena RN(name) on Doyle Herrera  being transferred to (unit) for routine progression of care Report consisted of patients Situation, Background, Assessment and  
Recommendations(SBAR). Information from the following report(s) SBAR, ED Summary and MAR was reviewed with the receiving nurse. Lines:  
Peripheral IV 05/10/19 Right Antecubital (Active) Site Assessment Clean, dry, & intact 5/10/2019 10:21 PM  
Phlebitis Assessment 0 5/10/2019 10:21 PM  
Infiltration Assessment 0 5/10/2019 10:21 PM  
Dressing Status Clean, dry, & intact 5/10/2019 10:21 PM  
Hub Color/Line Status Pink 5/10/2019 10:21 PM  
  
 
Opportunity for questions and clarification was provided. Patient transported with: 
 [employer]

## 2019-05-11 NOTE — PROGRESS NOTES
Primary Nurse Michele Tran and TRISTAN Garcia performed a dual skin assessment on this patient Impairment noted- see wound doc flow sheet Bon score is 13 Patient's skin is flaking and dry all over entire body. Scattered scratches on the patient's back. One small reddened open area on patient's left buttocks and one on the right buttocks.

## 2019-05-11 NOTE — PROGRESS NOTES
Problem: Mobility Impaired (Adult and Pediatric) Goal: *Acute Goals and Plan of Care (Insert Text) Outcome: Progressing Towards Goal 
 Physical Therapy Goals Initiated 5/11/2019 1. Patient will move from supine to sit and sit to supine  in bed with modified independence within 7 day(s). 2.  Patient will transfer from bed to chair and chair to bed with minimal assistance/contact guard assist using the least restrictive device within 7 day(s). 3.  Patient will perform sit to stand with supervision/set-up within 7 day(s). 4.  Patient will ambulate with minimal assistance/contact guard assist for 100 feet with the least restrictive device within 7 day(s). 5.  Patient will ascend/descend 4 stairs with 2 handrail(s) with minimal assistance/contact guard assist within 7 day(s). PHYSICAL THERAPY EVALUATION Patient: Caitlyn Hubbard (80 y.o. female) Date: 5/11/2019 Primary Diagnosis: Hypoglycemia [E16.2] Precautions:  Fall ASSESSMENT : 
Based on the objective data described below, the patient presents with concerns with mobility and strength. Cleared by nursing to participate. Spoken with nursing regarding home safety concerns and cognition. With some questioning, will simply answer \"well it doesn't matter. \" Therefore, difficult to discern accuracy of some of her statements about home situation. The great-niece has been contacted; nursing has also called APS to check on spouse in the home. Patient is willing to participate and try, SBA for bed mobility and min A to come to stand. Will likely require assist x2 for ambulation. Patient expressed increasing pain through feet and ankles with standing. Significant skin sloughing due to hygiene issues/concerns as well.  strength decreased due to skin pain. Patient verbalized willingness to go to a facility to work on returning to independent mobility before going home.    
 
Patient will benefit from skilled intervention to address the above impairments. Patient?s rehabilitation potential is considered to be Fair Factors which may influence rehabilitation potential include:  
? None noted ? Mental ability/status ? Medical condition ? Home/family situation and support systems ? Safety awareness 
? Pain tolerance/management 
? Other: PLAN : 
Recommendations and Planned Interventions: 
?           Bed Mobility Training             ? Neuromuscular Re-Education ? Transfer Training                   ? Orthotic/Prosthetic Training 
? Gait Training                         ? Modalities ? Therapeutic Exercises           ? Edema Management/Control ? Therapeutic Activities            ? Patient and Family Training/Education ? Other (comment): Frequency/Duration: Patient will be followed by physical therapy  6 times a week to address goals. Discharge Recommendations: Rehab vs [facility] Further Equipment Recommendations for Discharge: TBD -- reports she does have a walker. SUBJECTIVE:  
Patient stated ?my feet are hurting. ? OBJECTIVE DATA SUMMARY:  
HISTORY:   
Past Medical History:  
Diagnosis Date Arthritis Hypertension Past Surgical History:  
Procedure Laterality Date HX HYSTERECTOMY HX TONSILLECTOMY Prior Level of Function/Home Situation: Patient reports that she mostly independent -- that she cooks. But did not answer directly about if she actually ate. Reports she has not bathed in awhile as no one is able to assist her. She is sleeping on the couch because it is easier for her to get up from it than the bed. Personal factors and/or comorbidities impacting plan of care: Cognition, home situation. Home Situation Home Environment: Private residence # Steps to Enter: 5 Rails to Enter: Yes Hand Rails : Right(steps up/down sideways) One/Two Story Residence: One story Living Alone: No 
Support Systems: Spouse/Significant Other/Partner, Family member(s), Yazidi / sherice community Patient Expects to be Discharged to[de-identified] Skilled nursing facility Current DME Used/Available at Home: Jeannie Juárez EXAMINATION/PRESENTATION/DECISION MAKING:  
Critical Behavior: 
Neurologic State: Alert Orientation Level: Oriented to situation Cognition: Follows commands Safety/Judgement: Decreased insight into deficits Hearing: Auditory Auditory Impairment: None Skin: Refer to chart -- skin very dry and sloughing. Range Of Motion: 
AROM: Generally decreased, functional 
  
  
  
PROM: Generally decreased, functional 
  
  
  
Strength:   
Strength: Generally decreased, functional 
  
  
  
  
  
  
Tone & Sensation:  
Tone: Normal 
  
  
  
  
Sensation: Intact Coordination: 
Coordination: Generally decreased, functional 
Vision:  
  
Functional Mobility: 
Bed Mobility: 
Rolling: Stand-by assistance Supine to Sit: Stand-by assistance Sit to Supine: Stand-by assistance Transfers: 
Sit to Stand: Minimum assistance;Assist x1 Stand to Sit: Minimum assistance;Assist x1 Balance:  
Sitting: Intact Standing: Impaired Standing - Static: Constant support Ambulation/Gait Training: 
 Unable to assess at this time. Patient will likely require assist x2 for ambulation, but willing to work on standing. Tolerated standing with walker for approx 1 minutes before pain in feet and ankles and weakness became in issue. Gait Description (WDL): Exceptions to WDL(attempted, but patient reported pain and weakness.) Gait Abnormalities: (did not attempt due to concerns with safety and pain.) Base of Support: Narrowed Functional Measure: 
Barthel Index: 
Bathin Bladder: 5 Bowels: 5 Groomin Dressin Feedin Mobility: 0 Stairs: 5 Toilet Use: 5 Transfer (Bed to Chair and Back): 5 Total: 35/100 Percentage of impairment  
0% 1-19% 20-39% 40-59% 60-79% 80-99% 100% Barthel Score 0-100 100 99-80 79-60 59-40 20-39 1-19 
 0 The Barthel ADL Index: Guidelines 1. The index should be used as a record of what a patient does, not as a record of what a patient could do. 2. The main aim is to establish degree of independence from any help, physical or verbal, however minor and for whatever reason. 3. The need for supervision renders the patient not independent. 4. A patient's performance should be established using the best available evidence. Asking the patient, friends/relatives and nurses are the usual sources, but direct observation and common sense are also important. However direct testing is not needed. 5. Usually the patient's performance over the preceding 24-48 hours is important, but occasionally longer periods will be relevant. 6. Middle categories imply that the patient supplies over 50 per cent of the effort. 7. Use of aids to be independent is allowed. Tracy Leone., Barthel, D.W. (0826). Functional evaluation: the Barthel Index. [facility] (14)2. Sue Aragon debbie ABBEY Wood, Meli Braswell., Thea Jones., [place], [facility] (1999). Measuring the change indisability after inpatient rehabilitation; comparison of the responsiveness of the Barthel Index and Functional [place] Measure. Journal of Neurology, Neurosurgery, and Psychiatry, 66(4), 461-085. Landon Saleh, N.J.A, ANIA Aguayo, & Britt Tang M.A. (2004.) Assessment of post-stroke quality of life in cost-effectiveness studies: The usefulness of the Barthel Index and the EuroQoL-5D. [facility], 13, 590-22 Physical Therapy Evaluation Charge Determination History Examination Presentation Decision-Making MEDIUM  Complexity : 1-2 comorbidities / personal factors will impact the outcome/ POC  MEDIUM Complexity : 3 Standardized tests and measures addressing body structure, function, activity limitation and / or participation in recreation  MEDIUM Complexity : Evolving with changing characteristics  MEDIUM Complexity : FOTO score of 26-74 Based on the above components, the patient evaluation is determined to be of the following complexity level: MEDIUM Pain: 
Pain Scale 1: Numeric (0 - 10) Pain Intensity 1: 0 Activity Tolerance:  
Poor Please refer to the flowsheet for vital signs taken during this treatment. After treatment:  
?         Patient left in no apparent distress sitting up in chair ? Patient left in no apparent distress in bed 
? Call bell left within reach ? Nursing notified ? Caregiver present ? Bed alarm activated COMMUNICATION/EDUCATION:  
The patient?s plan of care was discussed with: Registered Nurse. ?         Fall prevention education was provided and the patient/caregiver indicated understanding. ? Patient/family have participated as able in goal setting and plan of care. ?         Patient/family agree to work toward stated goals and plan of care. ?         Patient understands intent and goals of therapy, but is neutral about his/her participation. ? Patient is unable to participate in goal setting and plan of care. Thank you for this referral. 
Abdelrahman Benson, PT Time Calculation: 25 mins

## 2019-05-11 NOTE — PROGRESS NOTES
CM attempted to meet with patient to have outpatient observation letters explained, given and explained to patient. Patient was sound asleep and did not wake to talk with CM. Cm noted PT evaluation of recommending rehab/Snf for patient. CM asked nurse to place order for OT evaluation as this will be needed for either inpatient rehab or Snf. Chart indicates that patient has been to [facility] in the past for rehab. Patient was ambulating with walker prior to admission. Prior to admission patient was living with her ,Tamir [phone] in one level home with 3 steps to enter , and has a great niece Peri Gonsales [phone] who is involved in patient's care. There seems to be some issues with living situation ( see CM assessment 5/10/19) Dr Jorje Abel will assess patient and make her inpatient if appropriate. CM will talk with patient about rehab choices after OT evaluation is completed to secure choice. . If patient remains observation inpatient rehab facility will be patient's only choice for rehab--If she is made inpatient, patient will have choice of inpatient or Snf. Rowan Harden Patient has Medicare and [employer]. No authorization needed for rehab

## 2019-05-11 NOTE — SENIOR SERVICES NOTE
Received verbal report from Senior Services/CM Met with patient, discussed follow up and home health. Explained that the home health is a transition of care to alleviate future ED visits and coordinate PCP follow up. She agreed to home health. [place] of choice given. Discussed her returning home, she remarked \"Oh, I love my home and want to go home. \"  
 
ED paged  - Quincy Rae [phone] is on phone and would like to speak to CM. Francisco Lwoery will be unable to come to [facility], she will go to home in AM.  Discussed with her that workup has not been completed, however CM will set up home health. Medical workup continues. Received order for home health, referral sent to [address] via [address]. Updated AVS. Jh Ding RN, BSN, [facility] 
ED Care Management [phone]

## 2019-05-11 NOTE — PROGRESS NOTES
Hospitalist Progress Note Lalit Flowers MD 
Answering service: [phone] OR [phone] from in house phone Cell: [phone] Date of Service:  2019 NAME:  Phil Jarquin :  10/11/1932 MRN:  [id number] Admission Summary:  
 
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 The patient is a very poor historian, not much history could be obtained from her, although she is alert, oriented x3, but does not provide much history, reports that she has been having some decreased appetite and has not been feeling well. The patient reports that she has not taken a bath, has not been taking good care of herself since the time she was discharged from the hospital in 10/2018. The patient reports that she has had some chills, decreased appetite and has not been sleeping well. The patient reports that today \"she felt weaker than usual,\" got concerned and decided to come to the hospital 
 
Interval history / Subjective:  
  
Patient poor historian, but answer simple questions, denied chest pain, cough or abdominal pain Assessment & Plan: Hypoglycemia due to poor oral intake 
-continue D5W 
-encourage po intake 
-patient ate soft diet and drink 
-hypoglycemia improved, continue monitor finger stick glucose Hypernatremia due to dehydration due to poor oral intake 
-Na 150, switch IVF to D5W at 100 ml hr and repeat bmp Hypothermia unclear etiology 
-afebrile, no leukocytosis  
-chest x ray no acute process 
-improved, off Imelda hugger 
  
Hx of HTN 
-BP normal, home norvasc is held, not on medication, monitor BP 
  
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 
-outpatient follow up at [facility] with neurosurgeon  
  
Chronic severe eczema -add protective skin cream 
  
Debility 
-PT/OT Hx of recurrent fall  
-PT/OT 
-Fall precaution Severe protein calorie malnutrition  
-soft diet with nutritional supplement  
  
Full Code; at risk for decompensation, will consult to palliative care team 
  
Code status: Full Code DVT prophylaxis: heparin Care Plan discussed with: Patient/Family, Nurse and  Disposition: SAH/Rehab Hospital Problems  Date Reviewed: 5/11/2019 Codes Class Noted POA * (Principal) Hypoglycemia ICD-10-CM: E16.2 ICD-9-CM: 251.2  10/26/2018 Unknown Vital Signs:  
 Last 24hrs VS reviewed since prior progress note. Most recent are: 
Visit Vitals /41 (BP 1 Location: Right arm, BP Patient Position: At rest;Supine) Pulse 70 Temp 97.6 °F (36.4 °C) Resp 17 Wt 46.7 kg (102 lb 15.3 oz) SpO2 100% BMI 20.79 kg/m² No intake or output data in the 24 hours ending 05/11/19 1221 Physical Examination:  
 
 
     
Constitutional:  No acute distress, cooperative, pleasant   
ENT:  Oral mucous moist, oropharynx benign. Neck supple, Resp:  CTA bilaterally. No wheezing/rhonchi/rales. No accessory muscle use CV:  Regular rhythm, normal rate, no murmurs, gallops, rubs GI:  Soft, non distended, non tender. normoactive bowel sounds, no hepatosplenomegaly Musculoskeletal:  No edema, Neurologic:  Moves all extremities. AAOx3, CN II-XII reviewed Skin:  diffuse chronic eczema all over Data Review:  
 Review and/or order of clinical lab test 
 
 
Labs:  
 
Recent Labs 05/11/19 
0417 05/10/19 
1639 WBC 5.8 6.5 HGB 8.7* 11.6 HCT 30.3* 40.2  266 Recent Labs 05/11/19 
0417 05/10/19 
1855 * 151*  
K 3.6 4.6 * 119* CO2 25 24 BUN 11 13 CREA 0.84 0.77 GLU 65 22* CA 7.8* 8.8 MG  --  2.6* Recent Labs 05/10/19 
1855 SGOT 27 ALT 20 * TBILI 0.7 TP 7.5 ALB 2.4*  
GLOB 5.1*  
 
 No results for input(s): INR, PTP, APTT in the last 72 hours. No lab exists for component: INREXT No results for input(s): FE, TIBC, PSAT, FERR in the last 72 hours. No results found for: FOL, RBCF No results for input(s): PH, PCO2, PO2 in the last 72 hours. Recent Labs 05/11/19 
0417 05/10/19 
2219 TROIQ <0.05 <0.05 No results found for: CHOL, CHOLX, CHLST, CHOLV, HDL, LDL, LDLC, DLDLP, TGLX, TRIGL, TRIGP, CHHD, CHHDX Lab Results Component Value Date/Time Glucose (POC) 100 05/11/2019 10:59 AM  
 Glucose (POC) 72 05/11/2019 09:57 AM  
 Glucose (POC) 167 (H) 05/11/2019 06:37 AM  
 Glucose (POC) 53 (L) 05/11/2019 06:08 AM  
 Glucose (POC) 96 05/11/2019 03:38 AM  
 
Lab Results Component Value Date/Time Color DARK YELLOW 05/10/2019 04:54 PM  
 Appearance CLEAR 05/10/2019 04:54 PM  
 Specific gravity 1.022 05/10/2019 04:54 PM  
 pH (UA) 5.0 05/10/2019 04:54 PM  
 Protein NEGATIVE  05/10/2019 04:54 PM  
 Glucose NEGATIVE  05/10/2019 04:54 PM  
 Ketone TRACE (A) 05/10/2019 04:54 PM  
 Bilirubin NEGATIVE  11/23/2018 07:46 AM  
 Urobilinogen 1.0 05/10/2019 04:54 PM  
 Nitrites NEGATIVE  05/10/2019 04:54 PM  
 Leukocyte Esterase NEGATIVE  05/10/2019 04:54 PM  
 Epithelial cells FEW 11/23/2018 07:46 AM  
 Bacteria 2+ (A) 11/23/2018 07:46 AM  
 WBC 5-10 11/23/2018 07:46 AM  
 RBC 0-5 11/23/2018 07:46 AM  
 
 
 
Medications Reviewed:  
 
Current Facility-Administered Medications Medication Dose Route Frequency  glucose chewable tablet 16 g  4 Tab Oral PRN  
 dextrose (D50W) injection syrg 12.5-25 g  12.5-25 g IntraVENous PRN  
 glucagon (GLUCAGEN) injection 1 mg  1 mg IntraMUSCular PRN  
 sodium chloride (NS) flush 5-40 mL  5-40 mL IntraVENous Q8H  
 sodium chloride (NS) flush 5-40 mL  5-40 mL IntraVENous PRN  
 acetaminophen (TYLENOL) tablet 650 mg  650 mg Oral Q4H PRN  
 heparin (porcine) injection 5,000 Units  5,000 Units SubCUTAneous Q8H  
  dextrose 5% infusion  100 mL/hr IntraVENous CONTINUOUS  
 glucose chewable tablet 16 g  4 Tab Oral PRN  
 dextrose (D50W) injection syrg 12.5-25 g  12.5-25 g IntraVENous PRN  
 glucagon (GLUCAGEN) injection 1 mg  1 mg IntraMUSCular PRN  
 
______________________________________________________________________ EXPECTED LENGTH OF STAY: - - - 
ACTUAL LENGTH OF STAY:          0 Yaron Gonzales MD

## 2019-05-11 NOTE — PROGRESS NOTES
TRANSFER - OUT REPORT: 
 
Verbal report given to Gerry Vega RN(name) on Phil Jarquin  being transferred to Michael Rm RN(unit) for routine progression of care Report consisted of patients Situation, Background, Assessment and  
Recommendations(SBAR). Information from the following report(s) SBAR, Kardex and Recent Results was reviewed with the receiving nurse. Lines:  
Peripheral IV 05/11/19 Left Antecubital (Active) Site Assessment Clean, dry, & intact 5/11/2019  4:13 PM  
Phlebitis Assessment 0 5/11/2019  4:13 PM  
Infiltration Assessment 0 5/11/2019  4:13 PM  
Dressing Status Clean, dry, & intact 5/11/2019  4:13 PM  
Dressing Type Transparent;Tape 5/11/2019  4:13 PM  
Hub Color/Line Status Blue;Capped;Flushed 5/11/2019  4:13 PM  
Alcohol Cap Used Yes 5/11/2019  4:13 PM  
  
 
Opportunity for questions and clarification was provided. Patient transported with: 
 [employer]

## 2019-05-11 NOTE — PROGRESS NOTES
8:52 AM 
CM received hand off from CM on-call to follow-up with [address] [place] [address] to see if they are able to accept and accommodate patient for services at discharge. [address] still reviewing patient at this time. Order put in for RN (medication reconciliation, assess skin) and  to assess home environment and community service needs. CM received a call from RN with updates that patient's Gertrude Corado  [phone]  called with concerns in regards to patient's home environment and level of care provided at home. RN informed CM that she would initiate an APS report. CM will continue to follow and assist with disposition needs as they arise. CM will follow-up with Home Health. SERAFIN Ponce/PIPER Care Management

## 2019-05-11 NOTE — PROGRESS NOTES
HYPOGLYCEMIC EPISODE DOCUMENTATION Patient with hypoglycemic episode(s) at [address]  on 5/11 BG value(s) pre-treatment 79 and 53 Was patient symptomatic? [] yes, [x] no Patient was treated with the following rescue medications/treatments: [x] D50 [] Glucose tablets 
              [] Glucagon 
              [x] 4oz juice 
              [] 6oz reg soda 
              [] 8oz low fat milk BG value post-treatment: 96 and 167 Once BG treated and value greater than 80mg/dl, pt was provided with the following: 
[x] snack 
[] meal 
Name of MD notified: Scott Vance The following orders were received: No orders received for now. Will continue to monitor. Problem: Falls - Risk of 
Goal: *Absence of Falls Description Document Darleen Landin Fall Risk and appropriate interventions in the flowsheet. Outcome: Progressing Towards Goal 
Pt remains free of falls during admission. Call bell and frequently used items within reach. Bedside table within reach. Patient provided non skid socks and instructed to call out for nurse when in need of assistance. Problem: Patient Education: Go to Patient Education Activity Goal: Patient/Family Education Outcome: Progressing Towards Goal 
 Pt educated on fall prevention. Pt demonstrates appropriate understanding. Pt is oriented and calls out appropriately for assistance ambulating. Purposeful hourly rounds initiated by staff.

## 2019-05-11 NOTE — ROUTINE PROCESS
TRANSFER - OUT REPORT: 
 
Verbal report given to Aleshia(name) on Hedy Jimenez  being transferred to [facility]) for routine progression of care Report consisted of patients Situation, Background, Assessment and  
Recommendations(SBAR). Information from the following report(s) SBAR and ED Summary was reviewed with the receiving nurse. Lines:  
Peripheral IV 05/10/19 Right Antecubital (Active) Site Assessment Clean, dry, & intact 5/10/2019 10:21 PM  
Phlebitis Assessment 0 5/10/2019 10:21 PM  
Infiltration Assessment 0 5/10/2019 10:21 PM  
Dressing Status Clean, dry, & intact 5/10/2019 10:21 PM  
Hub Color/Line Status Pink 5/10/2019 10:21 PM  
  
 
Opportunity for questions and clarification was provided. Patient transported with: 
 [employer]

## 2019-05-11 NOTE — PROGRESS NOTES
TRANSFER - IN REPORT: 
 
Verbal report received from Sidra Pop rn(name) on Paty Canes  being received from ED(unit) for routine progression of care Report consisted of patients Situation, Background, Assessment and  
Recommendations(SBAR). Information from the following report(s) SBAR, Kardex, ED Summary, Intake/Output, MAR and Recent Results was reviewed with the receiving nurse. Opportunity for questions and clarification was provided. Assessment completed upon patients arrival to unit and care assumed.

## 2019-05-11 NOTE — H&P
[address] HISTORY AND PHYSICAL Name:  Eugenia Bar 
MR#:  [id number] :  10/11/1932 ACCOUNT #:  [de-identified] ADMIT DATE:  05/10/2019 CHIEF COMPLAINT:  Weakness. 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 The patient is a very poor historian, not much history could be obtained from her, although she is alert, oriented x3, but does not provide much history, reports that she has been having some decreased appetite and has not been feeling well. The patient reports that she has not taken a bath, has not been taking good care of herself since the time she was discharged from the hospital in 10/2018. The patient reports that she has had some chills, decreased appetite and has not been sleeping well. The patient reports that today \"she felt weaker than usual,\" got concerned and decided to come to the hospital.  The patient appears to be quite frail and appears to have significant flaking of the skin. The patient appears to be completely disheveled and appears that she has not been taking good care for herself. The patient denies any other complaints or problems. Denies any headache, blurry vision, sore throat, trouble swallowing, trouble with speech. Denies chest pain, shortness of breath, cough, fever, chills, abdominal pain, constipation, diarrhea, urinary symptoms, focal or generalized neurological weakness, recent travel, sick contacts or any other concerns or problems. The patient does report that two days back she had a fall and had pain in her right ankle and also had some pain in her right lower leg. The patient denies any other complaints or problems. PAST MEDICAL HISTORY:  See above. HOME MEDICATIONS:  Currently, the patient is not taking any medications. Prescribed medications include Cymbalta 20 mg daily and Xalatan. ALLERGIES:  TO LATEX, ERYTHROMYCIN, NEOSPORIN AND PENICILLIN. SOCIAL HISTORY:  Denies tobacco abuse, alcohol use or IV drug abuse. FAMILY HISTORY:  Discussed and was found to be noncontributory. REVIEW OF SYMPTOMS:   All systems were reviewed and were found to be essentially negative except for the symptoms mentioned above. Again, the patient is a poor historian. PHYSICAL EXAMINATION: 
VITAL SIGNS:  Temperature 98.9, pulse 66, respiratory rate 15, blood pressure 137/45, pulse oximetry 100% on room air. GENERAL:  Alert, oriented x3, awake, very flat affect, disheveled, thin, cachectic female, appears to be stated age. HEENT:  Pupils equal and reactive to light. Dry mucous membranes. Tympanic membranes clear. NECK:  Supple. CHEST:  Decreased basilar breath sounds. HEART:  S1 and S2 heard. ABDOMEN:  Soft, nontender, nondistended. Bowel sounds are physiological. 
EXTREMITIES:  No clubbing, no cyanosis, no edema. NEURO/PSYCH:  Pleasant mood and affect. Cranial nerves II through XII grossly intact. Moves all 4. Strength 5/5. Sensory grossly within normal limits. SKIN:  Warm, extremely eczematous, dry, flaky. LABORATORY DATA:  White count 6.5, hemoglobin 11.6, hematocrit 40.2, platelets 822. Urine shows no signs of infection. Sodium 151, potassium 4.6, chloride 119, bicarb 24, anion gap 8, glucose 22, BUN 13, creatinine 0.77, calcium 8.8, magnesium 2.6, bilirubin total 0.7, ALT 20, AST 27, alkaline phosphatase 269. X-ray of the chest shows no acute process. EKG shows normal sinus rhythm with left axis deviation. ASSESSMENT AND PLAN: 
1. Hypoglycemia:  Persistent. We will start the patient on D5 half normal saline drip, Accu-Cheks every 2 hours, close monitoring. Provide neurovascular checks and further intervention will be per hospital course. We will continue to closely monitor. We will check for any infections. Reassess as needed. 2.  Hypernatremia:  The patient will be on D5 half normal saline. Repeat sodium levels in the morning. We will provide neurovascular checks, supportive care. Continue to closely monitor. Further intervention per hospital course. Reassess as needed. 3.  Weakness:  Acute on chronic, could be secondary to hypoglycemia and hypernatremia. IV hydration, optimize blood glucose control. We will get physical therapy consult. Rule out infections. We will put the patient on fall precautions, and we will continue to closely monitor. We will get a CT of the head to rule out any acute pathology. If persists, may consider further intervention and diagnostics. Continue to monitor. 4.  Right ankle pain:  We will get x-ray of the right ankle and further intervention per hospital course. Continue to monitor. Supportive care, pain control. 5.  Gastrointestinal and deep venous thrombosis prophylaxes: The patient will be on heparin. Evangelina Hutchinson MD 
 
 
[id number] 
D:  05/10/2019 22:02 
T:  05/10/2019 23:41 JOB #:  [id number]

## 2019-05-12 LAB
ANION GAP SERPL CALC-SCNC: 3 MMOL/L (ref 5–15)
BUN SERPL-MCNC: 7 MG/DL (ref 6–20)
BUN/CREAT SERPL: 12 (ref 12–20)
CALCIUM SERPL-MCNC: 7.8 MG/DL (ref 8.5–10.1)
CHLORIDE SERPL-SCNC: 112 MMOL/L (ref 97–108)
CO2 SERPL-SCNC: 31 MMOL/L (ref 21–32)
CREAT SERPL-MCNC: 0.58 MG/DL (ref 0.55–1.02)
ERYTHROCYTE [DISTWIDTH] IN BLOOD BY AUTOMATED COUNT: 19.7 % (ref 11.5–14.5)
GLUCOSE BLD STRIP.AUTO-MCNC: 107 MG/DL (ref 65–100)
GLUCOSE BLD STRIP.AUTO-MCNC: 129 MG/DL (ref 65–100)
GLUCOSE BLD STRIP.AUTO-MCNC: 145 MG/DL (ref 65–100)
GLUCOSE BLD STRIP.AUTO-MCNC: 55 MG/DL (ref 65–100)
GLUCOSE BLD STRIP.AUTO-MCNC: 68 MG/DL (ref 65–100)
GLUCOSE BLD STRIP.AUTO-MCNC: 75 MG/DL (ref 65–100)
GLUCOSE BLD STRIP.AUTO-MCNC: 80 MG/DL (ref 65–100)
GLUCOSE BLD STRIP.AUTO-MCNC: 81 MG/DL (ref 65–100)
GLUCOSE BLD STRIP.AUTO-MCNC: 82 MG/DL (ref 65–100)
GLUCOSE BLD STRIP.AUTO-MCNC: 87 MG/DL (ref 65–100)
GLUCOSE BLD STRIP.AUTO-MCNC: 99 MG/DL (ref 65–100)
GLUCOSE SERPL-MCNC: 76 MG/DL (ref 65–100)
HCT VFR BLD AUTO: 34.2 % (ref 35–47)
HGB BLD-MCNC: 9.8 G/DL (ref 11.5–16)
MCH RBC QN AUTO: 26.1 PG (ref 26–34)
MCHC RBC AUTO-ENTMCNC: 28.7 G/DL (ref 30–36.5)
MCV RBC AUTO: 91 FL (ref 80–99)
NRBC # BLD: 0 K/UL (ref 0–0.01)
NRBC BLD-RTO: 0 PER 100 WBC
PLATELET # BLD AUTO: 159 K/UL (ref 150–400)
PMV BLD AUTO: 11.2 FL (ref 8.9–12.9)
POTASSIUM SERPL-SCNC: 3.5 MMOL/L (ref 3.5–5.1)
RBC # BLD AUTO: 3.76 M/UL (ref 3.8–5.2)
SERVICE CMNT-IMP: ABNORMAL
SERVICE CMNT-IMP: NORMAL
SODIUM SERPL-SCNC: 146 MMOL/L (ref 136–145)
WBC # BLD AUTO: 4.5 K/UL (ref 3.6–11)

## 2019-05-12 PROCEDURE — 51798 US URINE CAPACITY MEASURE: CPT

## 2019-05-12 PROCEDURE — 80048 BASIC METABOLIC PNL TOTAL CA: CPT

## 2019-05-12 PROCEDURE — 65660000000 HC RM CCU STEPDOWN

## 2019-05-12 PROCEDURE — 85027 COMPLETE CBC AUTOMATED: CPT

## 2019-05-12 PROCEDURE — 82962 GLUCOSE BLOOD TEST: CPT

## 2019-05-12 PROCEDURE — 74011250636 HC RX REV CODE- 250/636: Performed by: HOSPITALIST

## 2019-05-12 PROCEDURE — 74011250636 HC RX REV CODE- 250/636: Performed by: FAMILY MEDICINE

## 2019-05-12 PROCEDURE — 36415 COLL VENOUS BLD VENIPUNCTURE: CPT

## 2019-05-12 PROCEDURE — 74011000250 HC RX REV CODE- 250: Performed by: FAMILY MEDICINE

## 2019-05-12 PROCEDURE — 94760 N-INVAS EAR/PLS OXIMETRY 1: CPT

## 2019-05-12 RX ADMIN — Medication 10 ML: at 08:34

## 2019-05-12 RX ADMIN — Medication 10 ML: at 22:00

## 2019-05-12 RX ADMIN — DEXTROSE MONOHYDRATE 75 ML/HR: 5 INJECTION, SOLUTION INTRAVENOUS at 12:35

## 2019-05-12 RX ADMIN — Medication 10 ML: at 16:00

## 2019-05-12 RX ADMIN — DEXTROSE MONOHYDRATE 100 ML/HR: 5 INJECTION, SOLUTION INTRAVENOUS at 04:13

## 2019-05-12 RX ADMIN — HEPARIN SODIUM 5000 UNITS: 5000 INJECTION INTRAVENOUS; SUBCUTANEOUS at 08:33

## 2019-05-12 RX ADMIN — HEPARIN SODIUM 5000 UNITS: 5000 INJECTION INTRAVENOUS; SUBCUTANEOUS at 01:36

## 2019-05-12 RX ADMIN — HEPARIN SODIUM 5000 UNITS: 5000 INJECTION INTRAVENOUS; SUBCUTANEOUS at 17:31

## 2019-05-12 RX ADMIN — DEXTROSE MONOHYDRATE 25 G: 500 INJECTION PARENTERAL at 20:21

## 2019-05-12 NOTE — PROGRESS NOTES
Bedside verbal shift change report given to oncoming nurse Abebe Boyer R.N. Opportunity for questions and clarifications provided.

## 2019-05-12 NOTE — PROGRESS NOTES
Hospitalist Progress Note Kulwinder Thurston MD 
Answering service: [phone] OR [phone] from in house phone Cell: [phone] Date of Service:  2019 NAME:  Shantelle Ramirez :  10/11/1932 MRN:  [id number] Admission Summary:  
 
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 The patient is a very poor historian, not much history could be obtained from her, although she is alert, oriented x3, but does not provide much history, reports that she has been having some decreased appetite and has not been feeling well. The patient reports that she has not taken a bath, has not been taking good care of herself since the time she was discharged from the hospital in 10/2018. The patient reports that she has had some chills, decreased appetite and has not been sleeping well. The patient reports that today \"she felt weaker than usual,\" got concerned and decided to come to the hospital 
 
Interval history / Subjective:  
  
Patient poor historian, rectal temp was 94.8 this morning, placed Imelda Hugger, temp improved to 97.5 No chest pain, cough or abdominal pain Assessment & Plan: Hypoglycemia due to poor oral intake 
-improving with D5W, Cut D5W to 75 ml/hr 
-encourage po intake 
-continue monitor finger stick glucose Hypernatremia due to dehydration due to poor oral intake 
-improving with IVF to D5W at 100 ml hr, Na 146 from 151 
-repeat bmp in am 
 
Hypothermia unclear etiology 
-afebrile, no leukocytosis  
-chest x ray no acute process 
-temp was 94.8 this morning, improved with Imelda hugger, temp 97.5  
  
Hx of HTN 
-BP normal, home norvasc is held, not on medication, monitor BP 
  
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  
  
Chronic severe eczema  
-continue protective skin cream 
  
Debility 
-PT/OT Hx of recurrent fall  
-PT/OT 
-Fall precaution Severe protein calorie malnutrition  
-soft diet with nutritional supplement  
  
Full Code; at risk for decompensation, palliative care team consulted 
  
Code status: Full Code DVT prophylaxis: heparin Care Plan discussed with: Patient/Family, Nurse and  Disposition: [facility]/Rehab Hospital Problems  Date Reviewed: 5/11/2019 Codes Class Noted POA Hypernatremia ICD-10-CM: E87.0 ICD-9-CM: 276.0  5/11/2019 Unknown * (Principal) Hypoglycemia ICD-10-CM: E16.2 ICD-9-CM: 251.2  10/26/2018 Unknown Vital Signs:  
 Last 24hrs VS reviewed since prior progress note. Most recent are: 
Visit Vitals /56 (BP 1 Location: Left arm, BP Patient Position: At rest) Pulse (!) 54 Temp 97.5 °F (36.4 °C) Resp 12 Wt 44.3 kg (97 lb 11.2 oz) SpO2 100% BMI 19.73 kg/m² Intake/Output Summary (Last 24 hours) at 5/12/2019 1118 Last data filed at 5/12/2019 5910 Gross per 24 hour Intake 2360 ml Output 550 ml Net 1810 ml Physical Examination:  
 
 
     
Constitutional:  No acute distress, cooperative, pleasant   
ENT:  Oral mucous moist, oropharynx benign. Neck supple, Resp:  CTA bilaterally. No wheezing/rhonchi/rales. No accessory muscle use CV:  Regular rhythm, normal rate, no murmurs, gallops, rubs GI:  Soft, non distended, non tender. normoactive bowel sounds, no hepatosplenomegaly Musculoskeletal:  No edema, Neurologic:  Moves all extremities. AAOx3, CN II-XII reviewed Skin:  diffuse chronic eczema all over Data Review:  
 Review and/or order of clinical lab test 
 
 
Labs:  
 
Recent Labs 05/12/19 
1532 05/11/19 
7808 WBC 4.5 5.8 HGB 9.8* 8.7* HCT 34.2* 30.3*  208 Recent Labs 05/12/19 
9866 05/11/19 
0417 05/10/19 1855  
* 150* 151*  
K 3.5 3.6 4.6 * 117* 119* CO2 31 25 24 BUN 7 11 13 CREA 0.58 0.84 0.77 GLU 76 65 22* CA 7.8* 7.8* 8.8 MG  --   --  2.6* Recent Labs 05/10/19 
1855 SGOT 27 ALT 20 * TBILI 0.7 TP 7.5 ALB 2.4*  
GLOB 5.1* No results for input(s): INR, PTP, APTT in the last 72 hours. No lab exists for component: INREXT, INREXT No results for input(s): FE, TIBC, PSAT, FERR in the last 72 hours. No results found for: FOL, RBCF No results for input(s): PH, PCO2, PO2 in the last 72 hours. Recent Labs 05/11/19 
0417 05/10/19 
2219 TROIQ <0.05 <0.05 No results found for: CHOL, CHOLX, CHLST, CHOLV, HDL, LDL, LDLC, DLDLP, TGLX, TRIGL, TRIGP, CHHD, CHHDX Lab Results Component Value Date/Time Glucose (POC) 87 05/12/2019 10:03 AM  
 Glucose (POC) 80 05/12/2019 09:51 AM  
 Glucose (POC) 75 05/12/2019 09:32 AM  
 Glucose (POC) 68 05/12/2019 09:12 AM  
 Glucose (POC) 82 05/12/2019 05:23 AM  
 
Lab Results Component Value Date/Time Color DARK YELLOW 05/10/2019 04:54 PM  
 Appearance CLEAR 05/10/2019 04:54 PM  
 Specific gravity 1.022 05/10/2019 04:54 PM  
 pH (UA) 5.0 05/10/2019 04:54 PM  
 Protein NEGATIVE  05/10/2019 04:54 PM  
 Glucose NEGATIVE  05/10/2019 04:54 PM  
 Ketone TRACE (A) 05/10/2019 04:54 PM  
 Bilirubin NEGATIVE  11/23/2018 07:46 AM  
 Urobilinogen 1.0 05/10/2019 04:54 PM  
 Nitrites NEGATIVE  05/10/2019 04:54 PM  
 Leukocyte Esterase NEGATIVE  05/10/2019 04:54 PM  
 Epithelial cells FEW 11/23/2018 07:46 AM  
 Bacteria 2+ (A) 11/23/2018 07:46 AM  
 WBC 5-10 11/23/2018 07:46 AM  
 RBC 0-5 11/23/2018 07:46 AM  
 
 
 
Medications Reviewed:  
 
Current Facility-Administered Medications Medication Dose Route Frequency  glucose chewable tablet 16 g  4 Tab Oral PRN  
 dextrose (D50W) injection syrg 12.5-25 g  12.5-25 g IntraVENous PRN  
 glucagon (GLUCAGEN) injection 1 mg  1 mg IntraMUSCular PRN  
  sodium chloride (NS) flush 5-40 mL  5-40 mL IntraVENous Q8H  
 sodium chloride (NS) flush 5-40 mL  5-40 mL IntraVENous PRN  
 acetaminophen (TYLENOL) tablet 650 mg  650 mg Oral Q4H PRN  
 heparin (porcine) injection 5,000 Units  5,000 Units SubCUTAneous Q8H  
 dextrose 5% infusion  100 mL/hr IntraVENous CONTINUOUS  
 glucose chewable tablet 16 g  4 Tab Oral PRN  
 dextrose (D50W) injection syrg 12.5-25 g  12.5-25 g IntraVENous PRN  
 glucagon (GLUCAGEN) injection 1 mg  1 mg IntraMUSCular PRN  
 
______________________________________________________________________ EXPECTED LENGTH OF STAY: - - - 
ACTUAL LENGTH OF STAY:          1 Favian De La Fuente MD

## 2019-05-12 NOTE — PROGRESS NOTES
HYPOGLYCEMIC EPISODE DOCUMENTATION Patient with hypoglycemic episode(s) at 0912(time) on 05/12019(date). BG value(s) pre-treatment 68 Was patient symptomatic? [] yes, [x] no Patient was treated with the following rescue medications/treatments: [] D50 [] Glucose tablets 
              [] Glucagon 
              [x] 4oz juice 
              [] 6oz reg soda 
              [] 8oz low fat milk BG value post-treatment: 75 Once BG treated and value greater than 80mg/dl, pt was provided with the following: 
[] snack 
[] meal 
Name of MD notified:Hernesto The following orders were received: None HYPOGLYCEMIC EPISODE DOCUMENTATION Patient with hypoglycemic episode(s) at 0932(time) on 05/12/19(date). BG value(s) pre-treatment 75 Was patient symptomatic? [] yes, [x] no Patient was treated with the following rescue medications/treatments: [] D50 [] Glucose tablets 
              [] Glucagon 
              [] 4oz juice [x] 6oz reg soda 
              [] 8oz low fat milk BG value post-treatment: 80-87 Once BG treated and value greater than 80mg/dl, pt was provided with the following: 
[] snack [x] meal 
Name of MD notified:Erena The following orders were received:none

## 2019-05-12 NOTE — PROGRESS NOTES
HYPOGLYCEMIC EPISODE DOCUMENTATION Patient with hypoglycemic episode(s) at 2248 (time) on 05/11/2019 (date). BG value(s) pre-treatment 65 Was patient symptomatic? [] yes, [x] no Patient was treated with the following rescue medications/treatments: [] D50 [] Glucose tablets 
              [] Glucagon 
              [x] 4oz juice 
              [] 6oz reg soda 
              [] 8oz low fat milk BG value post-treatment: 67 Once BG treated and value greater than 80mg/dl, pt was provided with the following: 
[] snack 
[] meal 
 
HYPOGLYCEMIC EPISODE DOCUMENTATION Patient with hypoglycemic episode(s) at 2309 (time) on 05/11/19 (date). BG value(s) pre-treatment 67 Was patient symptomatic? [] yes, [x] no Patient was treated with the following rescue medications/treatments: [x] D50 [] Glucose tablets 
              [] Glucagon 
              [] 4oz juice 
              [] 6oz reg soda 
              [] 8oz low fat milk BG value post-treatment: 145 Once BG treated and value greater than 80mg/dl, pt was provided with the following: 
[] snack 
[] meal 
Will continue to monitor Hospitalist paged at [phone] per clarification of order about transferring pt to telemetry bed. Hospitalist repaged at [phone]. Will continue to monitor. 2258: Hospitalist U confirmed order to transfer pt to telemetry bed. Nursing Supervisor informed and orders placed. Will continue to monitor.

## 2019-05-12 NOTE — PROGRESS NOTES
Problem: Falls - Risk of 
Goal: *Absence of Falls Description Document Jose Diehl Fall Risk and appropriate interventions in the flowsheet.  
Outcome: Progressing Towards Goal

## 2019-05-13 LAB
ANION GAP SERPL CALC-SCNC: 6 MMOL/L (ref 5–15)
BUN SERPL-MCNC: 7 MG/DL (ref 6–20)
BUN/CREAT SERPL: 11 (ref 12–20)
CALCIUM SERPL-MCNC: 8 MG/DL (ref 8.5–10.1)
CHLORIDE SERPL-SCNC: 103 MMOL/L (ref 97–108)
CO2 SERPL-SCNC: 28 MMOL/L (ref 21–32)
CREAT SERPL-MCNC: 0.64 MG/DL (ref 0.55–1.02)
ERYTHROCYTE [DISTWIDTH] IN BLOOD BY AUTOMATED COUNT: 19 % (ref 11.5–14.5)
GLUCOSE BLD STRIP.AUTO-MCNC: 68 MG/DL (ref 65–100)
GLUCOSE BLD STRIP.AUTO-MCNC: 71 MG/DL (ref 65–100)
GLUCOSE BLD STRIP.AUTO-MCNC: 74 MG/DL (ref 65–100)
GLUCOSE BLD STRIP.AUTO-MCNC: 77 MG/DL (ref 65–100)
GLUCOSE BLD STRIP.AUTO-MCNC: 79 MG/DL (ref 65–100)
GLUCOSE BLD STRIP.AUTO-MCNC: 81 MG/DL (ref 65–100)
GLUCOSE BLD STRIP.AUTO-MCNC: 84 MG/DL (ref 65–100)
GLUCOSE BLD STRIP.AUTO-MCNC: 85 MG/DL (ref 65–100)
GLUCOSE BLD STRIP.AUTO-MCNC: 86 MG/DL (ref 65–100)
GLUCOSE BLD STRIP.AUTO-MCNC: 89 MG/DL (ref 65–100)
GLUCOSE BLD STRIP.AUTO-MCNC: 93 MG/DL (ref 65–100)
GLUCOSE SERPL-MCNC: 143 MG/DL (ref 65–100)
HCT VFR BLD AUTO: 40.1 % (ref 35–47)
HGB BLD-MCNC: 12.2 G/DL (ref 11.5–16)
MCH RBC QN AUTO: 25.8 PG (ref 26–34)
MCHC RBC AUTO-ENTMCNC: 30.4 G/DL (ref 30–36.5)
MCV RBC AUTO: 85 FL (ref 80–99)
NRBC # BLD: 0 K/UL (ref 0–0.01)
NRBC BLD-RTO: 0 PER 100 WBC
PLATELET # BLD AUTO: 211 K/UL (ref 150–400)
PMV BLD AUTO: 11.4 FL (ref 8.9–12.9)
POTASSIUM SERPL-SCNC: 4 MMOL/L (ref 3.5–5.1)
RBC # BLD AUTO: 4.72 M/UL (ref 3.8–5.2)
SERVICE CMNT-IMP: NORMAL
SODIUM SERPL-SCNC: 137 MMOL/L (ref 136–145)
WBC # BLD AUTO: 5.7 K/UL (ref 3.6–11)

## 2019-05-13 PROCEDURE — 80048 BASIC METABOLIC PNL TOTAL CA: CPT

## 2019-05-13 PROCEDURE — 82962 GLUCOSE BLOOD TEST: CPT

## 2019-05-13 PROCEDURE — 74011250636 HC RX REV CODE- 250/636: Performed by: FAMILY MEDICINE

## 2019-05-13 PROCEDURE — 97165 OT EVAL LOW COMPLEX 30 MIN: CPT | Performed by: OCCUPATIONAL THERAPIST

## 2019-05-13 PROCEDURE — 74011000258 HC RX REV CODE- 258: Performed by: INTERNAL MEDICINE

## 2019-05-13 PROCEDURE — 85027 COMPLETE CBC AUTOMATED: CPT

## 2019-05-13 PROCEDURE — 65660000000 HC RM CCU STEPDOWN

## 2019-05-13 PROCEDURE — 74011250637 HC RX REV CODE- 250/637: Performed by: HOSPITALIST

## 2019-05-13 PROCEDURE — 36415 COLL VENOUS BLD VENIPUNCTURE: CPT

## 2019-05-13 PROCEDURE — 97530 THERAPEUTIC ACTIVITIES: CPT | Performed by: OCCUPATIONAL THERAPIST

## 2019-05-13 PROCEDURE — 97535 SELF CARE MNGMENT TRAINING: CPT | Performed by: OCCUPATIONAL THERAPIST

## 2019-05-13 PROCEDURE — 94760 N-INVAS EAR/PLS OXIMETRY 1: CPT

## 2019-05-13 PROCEDURE — 74011250636 HC RX REV CODE- 250/636: Performed by: HOSPITALIST

## 2019-05-13 RX ORDER — DEXTROSE MONOHYDRATE 100 MG/ML
50 INJECTION, SOLUTION INTRAVENOUS CONTINUOUS
Status: DISCONTINUED | OUTPATIENT
Start: 2019-05-13 | End: 2019-05-14

## 2019-05-13 RX ADMIN — HEPARIN SODIUM 5000 UNITS: 5000 INJECTION INTRAVENOUS; SUBCUTANEOUS at 08:53

## 2019-05-13 RX ADMIN — DEXTROSE MONOHYDRATE 50 ML/HR: 10 INJECTION, SOLUTION INTRAVENOUS at 02:45

## 2019-05-13 RX ADMIN — DEXTROSE MONOHYDRATE 50 ML/HR: 10 INJECTION, SOLUTION INTRAVENOUS at 19:18

## 2019-05-13 RX ADMIN — WHITE PETROLATUM: 1.75 OINTMENT TOPICAL at 15:53

## 2019-05-13 RX ADMIN — HEPARIN SODIUM 5000 UNITS: 5000 INJECTION INTRAVENOUS; SUBCUTANEOUS at 01:07

## 2019-05-13 RX ADMIN — DEXTROSE MONOHYDRATE 75 ML/HR: 5 INJECTION, SOLUTION INTRAVENOUS at 01:08

## 2019-05-13 RX ADMIN — Medication 10 ML: at 06:00

## 2019-05-13 RX ADMIN — WHITE PETROLATUM: 1.75 OINTMENT TOPICAL at 19:14

## 2019-05-13 RX ADMIN — Medication 10 ML: at 22:00

## 2019-05-13 RX ADMIN — HEPARIN SODIUM 5000 UNITS: 5000 INJECTION INTRAVENOUS; SUBCUTANEOUS at 17:50

## 2019-05-13 NOTE — DIABETES MGMT
DTC Progress Note Recommendations/ Comments: Chart review for severe hypoglycemia. BG 49 mg/dL on arrival @ 2033 on 5/10/2019. She has had multiple episodes of hypoglycemia each day since admission. Overnight BG 55 mg/dL. This AM BG in the 70's. Noted IV D10 @ 50 ml/hr infusing. DTC yoly continue to follow Current hospital DM medication: none Chart reviewed on Curtistine Rash. Patient is a 80 y.o. female with no hx DM. A1c:  
Lab Results Component Value Date/Time Hemoglobin A1c 4.7 10/27/2018 02:48 AM  
 
 
Recent Glucose Results:  
Lab Results Component Value Date/Time  (H) 05/13/2019 11:17 AM  
 GLUCPOC 86 05/13/2019 10:18 AM  
 GLUCPOC 74 05/13/2019 09:44 AM  
 GLUCPOC 84 05/13/2019 07:49 AM  
  
 
Lab Results Component Value Date/Time Creatinine 0.64 05/13/2019 11:17 AM  
 
CrCl cannot be calculated (Unknown ideal weight. ). Active Orders Diet DIET CARDIAC Mechanical Soft PO intake:  
Patient Vitals for the past 72 hrs: 
 % Diet Eaten 05/12/19 1754 50 % 05/12/19 1340 50 % 05/12/19 0929 75 % Will continue to follow as needed. Thank you Sander Jaime RN, CDE Time spent: 8 min

## 2019-05-13 NOTE — WOUND CARE
WOCN Note:  
 
New consult placed for assessment of hips, sacrum and buttocks. Lula RN at the bedside for assessment. Chart reviewed. 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  
Patient is A&O x 3, communicative and requires assist with repositioning. Bed: [facility] Patient wearing briefs for incontinence. Sacrum, hip and heels intact without erythema. Patient reports that her heels are mildly tender. Generalized dry flaky skin from eczema. 1.  POA Right buttock, partial thickness wound:  0.5 x 0.5 x 0.1 cm; 100% red; no exudate; periwound has hyperpigmented dark skin. 2.  POA Left buttock, partial thickness wound:  0.8 x 0.4 x 0.1 cm; 100% red; no exudate; periwound has hyperpigmented dark skin. 3.  POA Right groin fold has mild erythema and denudation (< 0.1 x 0.5 x 0.1 cm; 100% red)  from moisture. Recommendations:   
Prevalon boots 1. Buttocks:  Every week cleanse with saline apply Duoderm. 2.  Apply Aquaphor to dry skin twice daily. Skin Care & Pressure Prevention: 
Minimize layers of linen/pads under patient to optimize support surface. Turn/reposition approximately every 2 hours and offload heels. Manage incontinence / promote continence Discussed above plan with patient and RN. Transition of Care: Plan to follow as needed while admitted to hospital. 
 
CHARU BlairN RN [facility] Wound Care Office [phone] Pager [phone]

## 2019-05-13 NOTE — PROGRESS NOTES
Problem: Self Care Deficits Care Plan (Adult) Goal: *Acute Goals and Plan of Care (Insert Text) Description Occupational Therapy Goals Initiated 5/13/2019 1. Patient will perform grooming with modified independence standing at the sink within 7 day(s). 2.  Patient will perform upper body dressing and lower body dressing with supervision/set-up within 7 day(s). 3.  Patient will perform simple home management with supervision/set-up within 7 day(s). 4.  Patient will perform toilet transfers with modified independence within 7 day(s). 5.  Patient will perform all aspects of toileting with supervision/set-up within 7 day(s). 6.  Patient will participate in upper extremity therapeutic exercise/activities with supervision/set-up for 10 minutes in standing with < or = 2 rest breaks and supervision within 7 day(s). OCCUPATIONAL THERAPY EVALUATION Patient: Nury Woods (80 y.o. female) Date: 5/13/2019 Primary Diagnosis: Hypoglycemia [E16.2] Hypernatremia [E87.0] Precautions:   Fall ASSESSMENT : 
Based on the objective data described below, patient presents with Setup and Contact guard assistance upper body ADLs, Maximum assistance lower body ADLs, and Minimum assistance functional mobility. The following are barriers to ADL independence while in acute care:  
- Cognitive and/or behavioral: safety awareness and fear of falling - Medical condition: strength, functional endurance, standing balance and low blood sugars    
- Other:    
 
Patient will benefit from skilled acute intervention to address the above impairments. Patient?s rehabilitation potential is considered to be Fair Discharge recommendations: Rehab at skilled nursing facility (SNF) (to regain functional baseline patient requires rehab) If above is not an option then recommend: 24 supervision Barriers to discharging home, in addition to above listed impairments: family availability to assist 
 significant other is elderly and unable to assist in patient care 
history of falls 
home environment unsafe due to per patient needs to be cleaned, waiting for  . Equipment recommendations for successful discharge (if) home: to be determined PLAN : 
Recommendations and Planned Interventions: self care training, functional mobility training, therapeutic exercise, balance training, therapeutic activities, endurance activities, patient education, home safety training and family training/education Frequency/Duration: Patient will be followed by occupational therapy 4 times a week to address goals. SUBJECTIVE:  
Patient stated ? I don't think I am.? when asked if she is safe to go home OBJECTIVE DATA SUMMARY:  
HISTORY:  
Past Medical History:  
Diagnosis Date Arthritis Hypertension Past Surgical History:  
Procedure Laterality Date HX HYSTERECTOMY HX TONSILLECTOMY Prior Level of Function/Environment/Context: lives with her elderly  who has arthritis, he drives, reports they do all cooking and cleaning but not doing well, reports he hasn't drove recently, unclear how they get groceries, reports baby sister checks on them Occupations in which the patient is/was successful, what are the barriers preventing that success:  
Performance Patterns (routines, roles, habits, and rituals):  
Personal Interests and/or values:  
Expanded or extensive additional review of patient history:  
 
Home Situation Home Environment: Private residence # Steps to Enter: 5 Rails to Enter: Yes Hand Rails : Right(steps up/down sideways) One/Two Story Residence: One story Living Alone: No 
Support Systems: Spouse/Significant Other/Partner, Family member(s), [employer] Patient Expects to be Discharged to[de-identified] Skilled nursing facility Current DME Used/Available at Home: Walker, rolling Tub or Shower Type: Tub/Shower combination Hand dominance: Right EXAMINATION OF PERFORMANCE DEFICITS: 
Cognitive/Behavioral Status: 
Neurologic State: Alert; Appropriate for age;Confused Orientation Level: Oriented to person;Oriented to place;Oriented to situation;Disoriented to time Cognition: Appropriate for age attention/concentration Perception: Appears intact Perseveration: No perseveration noted Safety/Judgement: Awareness of environment; Fall prevention;Home safety; Insight into deficits Skin: dry, flaking all over, see wound care note for additional 
 
Edema: none Hearing: Auditory Auditory Impairment: None Vision/Perceptual:   
    
    
    
  
    
Acuity: Able to read clock/calendar on wall without difficulty Corrective Lenses: Glasses Range of Motion: 
AROM: Within functional limits PROM: Within functional limits Strength: 
Strength: Generally decreased, functional 
  
  
  
  
Coordination: 
Coordination: Generally decreased, functional 
Fine Motor Skills-Upper: Left Impaired;Right Impaired Gross Motor Skills-Upper: Left Intact; Right Intact Tone & Sensation: 
Tone: Normal 
Sensation: Intact(per patient report) Balance: 
Sitting: Intact Standing: Impaired; With support Standing - Static: Constant support; Fair 
Standing - Dynamic : Poor Functional Mobility and Transfers for ADLs: 
Bed Mobility: 
Supine to Sit: Supervision;Stand-by assistance Scooting: Supervision Transfers: 
Sit to Stand: Minimum assistance Stand to Sit: Minimum assistance;Assist x1;Other (comment)(cues for safety) Bed to Chair: Minimum assistance; Adaptive equipment;Assist x1 Bathroom Mobility: Minimum assistance Toilet Transfer : Minimum assistance;Assist x1;Adaptive equipment; Additional time(stand pivot to bedside commode) ADL Assessment: 
Feeding: Setup Oral Facial Hygiene/Grooming: Setup Bathing: Maximum assistance Upper Body Dressing: Minimum assistance Lower Body Dressing: Maximum assistance; Other (comment)(unable to reach feet with LE's crossed) Toileting: Maximum assistance(incontinent bladder, brief and pur-wic in place) ADL Intervention and task modifications: 
  
 
 Educated on role of OT,use of call bell, benefit of out of bed, use of bedside commode versus Pur-wic, educated on home safety and recommendation for rehab Patient instructed and indicated understanding the benefits of maintaining activity tolerance, functional mobility, and independence with self care tasks during acute stay  to ensure safe return home and to baseline. Encouraged patient to increase frequency and duration OOB, be out of bed for all meals, perform daily ADLs (as approved by RN/MD regarding bathing etc), and performing functional mobility to/from bathroom. Cognitive Retraining Safety/Judgement: Awareness of environment; Fall prevention;Home safety; Insight into deficits Therapeutic Exercise: Instructed to perform UE AROM exercises seated in chair Functional Measure: 
Barthel Index: 
Bathin Bladder: 0 Bowels: 10 
Groomin Dressin Feedin Mobility: 10 Stairs: 5 Toilet Use: 5 Transfer (Bed to Chair and Back): 10 Total: 50/100 Percentage of impairment  
0% 1-19% 20-39% 40-59% 60-79% 80-99% 100% Barthel Score 0-100 100 99-80 79-60 59-40 20-39 1-19 
 0 The Barthel ADL Index: Guidelines 1. The index should be used as a record of what a patient does, not as a record of what a patient could do. 2. The main aim is to establish degree of independence from any help, physical or verbal, however minor and for whatever reason. 3. The need for supervision renders the patient not independent. 4. A patient's performance should be established using the best available evidence. Asking the patient, friends/relatives and nurses are the usual sources, but direct observation and common sense are also important. However direct testing is not needed. 5. Usually the patient's performance over the preceding 24-48 hours is important, but occasionally longer periods will be relevant. 6. Middle categories imply that the patient supplies over 50 per cent of the effort. 7. Use of aids to be independent is allowed. Ric Benjamin., Barthel, D.W. (9873). Functional evaluation: the Barthel Index. [address] [facility] (14)2. Ninfa Alejandre debbie ABBEY Wood, Connor Crick., Joretcasa Police., Linda Nyhan, [address] [facility] (1999). Measuring the change indisability after inpatient rehabilitation; comparison of the responsiveness of the Barthel Index and Functional [place] Measure. Journal of Neurology, Neurosurgery, and Psychiatry, 66(4), 926-570. Ciera Chance, N.J.A, ANIA Aguayo, & Amdao Sierra, M.A. (2004.) Assessment of post-stroke quality of life in cost-effectiveness studies: The usefulness of the Barthel Index and the EuroQoL-5D. [facility], 13, 521-04 Occupational Therapy Evaluation Charge Determination History Examination Decision-Making LOW Complexity : Brief history review  MEDIUM Complexity : 3-5 performance deficits relating to physical, cognitive , or psychosocial skils that result in activity limitations and / or participation restrictions MEDIUM Complexity : Patient may present with comorbidities that affect occupational performnce. Miniml to moderate modification of tasks or assistance (eg, physical or verbal ) with assesment(s) is necessary to enable patient to complete evaluation Based on the above components, the patient evaluation is determined to be of the following complexity level: LOW Pain: No complaint Activity Tolerance:  
Good Please refer to the flowsheet for vital signs taken during this treatment. After treatment patient left:  
Up in chair Call light within reach RN notified Tray table in front of her while eating breakfast    
Recommend call light to RN 
 COMMUNICATION/EDUCATION:  
The patient?s plan of care was discussed with: Registered Nurse. Home safety education was provided and the patient/caregiver indicated understanding. and Patient/family have participated as able in goal setting and plan of care. This patient?s plan of care is appropriate for delegation to KIM. Thank you for this referral. 
Umu Zamora OTR/L Time Calculation: 40 mins

## 2019-05-13 NOTE — PROGRESS NOTES
Met with pt at the bedside after reviewing medical plans with MD.  Expecting at least one more overnight in hospital, at minimum, pt remains on D10, hypernatremic and hypoglycemic. LACEY provided a list of SNF's to pt and she prefers CM not call her  as Curtis Xiomy doesn't keep those things organized. \" but she did agree for CM to call her great niece. She advised CM she has gone to [facility] before and would agree to go back again as she doesn't feel strong enough to go home. Advised that her medical team would also support SNF placement at discharge.  
 
SERAFIN Dodd

## 2019-05-13 NOTE — PROGRESS NOTES
Medication Reconciliation Update: 
 
  Medication reconciliation interview / process completed upon initial admission through the Emergency Department on Friday, May 10th, 2019. At this time, all medications were removed from the patient's [facility] Prior to Admission medication list secondary to a discussion with the admitting hospitalist as it did not appear that the patient had filled any chronic medications since November of 2018. However, additional questions about possible creams and ointments were raised today, 5/13/2019 secondary to the patient having significant eczema. The [facility]: The patient has had the following creams / ointments filled in the past with each having a prescription filled in March of 2019; however, whether the patient picked up these prescriptions cannot be confirmed. Therefore, the recommendation is to initiate the patient on appropriate topical therapy while inpatient at this time. The previous topicals were: 
 
                          Desonide 0.05% Ointment Fluocinonide 0.05% Cream 
                         Triamcinolone 0.1% Ointment Thank you for allowing me to participate in the care of this patient. If there are any further questions, please contact the pharmacy at  or the medication reconciliation pharmacist at . Martin Polanco., [facility]

## 2019-05-13 NOTE — PROGRESS NOTES
Patient's niece Amina Lr -[phone]- was provided update on patient. Niece says patient's  still expects patient to cook and take care of the house despite age, illness, and more. Niece states patient's  is \"very stubborn and expects her to cook for him\". Niece also states she has cesia given permission by patient to inform her grandkids of her being in hospital so she will be getting calls maybe visitors. Patient and  are usually very private. Patient mainly trusts her niece, Cassidy Harris.

## 2019-05-13 NOTE — PROGRESS NOTES
NUTRITION COMPLETE ASSESSMENT 
 
RECOMMENDATIONS:  
1. Continue with current diet - Mechanical soft 2. Ensure Enlive BID 3. Continue to monitor wt for trends. Interventions/Plan:  
Food/Nutrient Delivery:    Commercial supplement Assessment:  
Reason for Assessment:  
[x] Provider Consult Diet: Cardiac, Mechanical soft Supplements: Ensure [employer] Nutritionally Significant Medications: [x] Reviewed Meal Intake:  
Patient Vitals for the past 100 hrs: 
 % Diet Eaten 05/12/19 1754 50 % 05/12/19 1340 50 % 05/12/19 0929 75 % Subjective: 
I have just lost so much weight. Objective: 
80year old admitted for hypoglycemia. PMH: HTN, arthritis, chronic suprasellar mass,  Severe eczema among others. Pt with 7.7 kg or 14% wt loss from UBW over the past 6 months reflecting severe wt change. Pt contributes wt loss d/t missing teeth (lost dentures in previous hospitalization at another facility) and poor appetite. Mrs. Misty Ye usually drinks an Ensure or equivalent daily at home,  cooks. Sending mechanical soft meals for ease and Ensure Enlive to increase nutritional intake. Patient meets criteria for Severe Protein Calorie Malnutrition as evidenced by:  
ASPEN Malnutrition Criteria Acute Illness, Chronic Illness, or Social/Enviornmental: Chronic illness Energy Intake: Less than/equal to 75% of est energy req for greater than/equal to 1 month Weight Loss: Greater than 10% x 6 mos Body Fat: Severe Muscle Mass: Severe ASPEN Malnutrition Score - Chronic Illness: 24 Chronic Illness - Malnutrition Diagnosis: Severe malnutrition. Estimated Nutrition Needs:  
Kcals/day: 1400 Kcals/day(5394-0866 kcal/day ) Protein: 56 g Fluid: (1 ml/kcal) Based On: Kcal/kg - specify (Comment) Weight Used: Actual wt Pt expected to meet estimated nutrient needs:  []   Yes     []  No [x] Unable to predict at this time Nutrition Diagnosis: 1. Unintended weight loss related to poor appetite and poor dentition as evidenced by 7.7 kg wt loss over the past 6 months. Goals:   
 Consume 50% meals and 2 supplements daily x 5-7 days. Monitoring & Evaluation: - Total energy intake, Liquid meal replacement - Weight/weight change, Electrolyte and renal profile Previous Nutrition Goals Met:   N/A Previous Recommendations:    N/A Education & Discharge Needs: 
 [x] None Identified 
 [] Identified and addressed  
 [] Participated in care plan, discharge planning, and/or interdisciplinary rounds Cultural, Worship and ethnic food preferences identified: None Skin Integrity: [x]Intact  []Other Edema: [x]None []Other Food Allergies: [x]None []Other Diet Restrictions: Cultural/Religion Preference(s): None Anthropometrics:   
Weight Loss Metrics 5/13/2019 11/23/2018 7/28/2016 6/18/2016 3/25/2015 2/16/2013 Today's Wt 105 lb 121 lb 14.6 oz - 156 lb 162 lb 178 lb BMI 21.21 kg/m2 24.62 kg/m2 - 31.49 kg/m2 27.79 kg/m2 35.93 kg/m2 Weight Source: Bed 
 ,   
Body mass index is 21.21 kg/m². IBW : 47.6 kg (105 lb),   
 ,   
 
Labs:   
Lab Results Component Value Date/Time Sodium 137 05/13/2019 11:17 AM  
 Potassium 4.0 05/13/2019 11:17 AM  
 Chloride 103 05/13/2019 11:17 AM  
 CO2 28 05/13/2019 11:17 AM  
 Glucose 143 (H) 05/13/2019 11:17 AM  
 BUN 7 05/13/2019 11:17 AM  
 Creatinine 0.64 05/13/2019 11:17 AM  
 Calcium 8.0 (L) 05/13/2019 11:17 AM  
 Magnesium 2.6 (H) 05/10/2019 06:55 PM  
 Phosphorus 3.4 10/29/2018 02:10 AM  
 Albumin 2.4 (L) 05/10/2019 06:55 PM  
 
Lab Results Component Value Date/Time  Hemoglobin A1c 4.7 10/27/2018 02:48 AM  
 
 
Wilber Thomas RD

## 2019-05-13 NOTE — PROGRESS NOTES
Patient is resting with eyes closed no complaints at this time. Will continue to monitor patient for changes in her condition. 2016 patient's blood glucose 55 OJ and dextrose 25 g given patient is AOX3 no confusion at this time. Will recheck BG in 15 minutes 2031 BG rechecked 129  BG needs to be checked Q 2 hours patient's BG drops before the 4 hour quinn. 2150 BG 81 
 
0103 BG 77 ivet ensure was given 0117 recheck BG 85   
 
0149 paged Dr Ana Hernandez to see if we should changed patient's IV fluid  Since BG continues to drop about every 2 hours. 4810 Dr. Ana Hernandez called back he gave an order to changed IV fluids to D10 with a rate of 50 ml/her. 0439 BG 79 ivet ensure given 2653 patient resting with eyes closed no complaints at this time. 0750 Bedside and Verbal shift change report given to Celi Judge RN  (oncoming nurse) by Taco Doyle RN  (offgoing nurse). Report included the following information SBAR, MAR, Recent Results and Med Rec Status.   
 
 
 
0500 rechecked BG 93

## 2019-05-13 NOTE — PROGRESS NOTES
Hospitalist Progress Note Tiff Fowler MD 
Answering service: [phone] OR [phone] from in house phone Cell: [phone] Date of Service:  2019 NAME:  Amanda Crowley :  10/11/1932 MRN:  [id number] Admission Summary:  
 
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 The patient is a very poor historian, not much history could be obtained from her, although she is alert, oriented x3, but does not provide much history, reports that she has been having some decreased appetite and has not been feeling well. The patient reports that she has not taken a bath, has not been taking good care of herself since the time she was discharged from the hospital in 10/2018. The patient reports that she has had some chills, decreased appetite and has not been sleeping well. The patient reports that today \"she felt weaker than usual,\" got concerned and decided to come to the hospital 
 
Interval history / Subjective: She said she feels better, no chest pain, Assessment & Plan: Hypoglycemia due to poor oral intake 
-finger stick glucose dropped to 54 last night, on  D10 W at 50 ml/hr 
-encourage po intake 
-continue monitor finger stick glucose Hypernatremia due to dehydration due to poor oral intake 
-improved with IVF to D10W at 50 ml hr, Na 137 
-repeat bmp in am 
 
Hypothermia unclear etiology 
-afebrile, no leukocytosis  
-chest x ray no acute process 
-temp was 94.8  
-resolved, off niki hugger  
  
Hx of HTN 
-BP normal, add norvasc,monitor BP 
  
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 
-outpatient follow up at [facility] with neurosurgeon  
  
Chronic severe eczema  
-continue protective skin cream 
  
Debility 
-PT/OT Hx of recurrent fall  
-PT/OT 
-Fall precaution Severe protein calorie malnutrition  
-soft diet with nutritional supplement  
  
Full Code; at risk for decompensation, palliative care team consulted 
  
Code status: Full Code DVT prophylaxis: heparin Care Plan discussed with: Patient/Family, Nurse and  Disposition: SNF/Rehab Hospital Problems  Date Reviewed: 5/11/2019 Codes Class Noted POA Hypernatremia ICD-10-CM: E87.0 ICD-9-CM: 276.0  5/11/2019 Unknown * (Principal) Hypoglycemia ICD-10-CM: E16.2 ICD-9-CM: 251.2  10/26/2018 Unknown Vital Signs:  
 Last 24hrs VS reviewed since prior progress note. Most recent are: 
Visit Vitals /70 (BP 1 Location: Left arm, BP Patient Position: At rest) Pulse 72 Temp 97.6 °F (36.4 °C) Resp 16 Wt 47.6 kg (105 lb) SpO2 99% BMI 21.21 kg/m² Intake/Output Summary (Last 24 hours) at 5/13/2019 1244 Last data filed at 5/13/2019 1148 Gross per 24 hour Intake 1617.92 ml Output 750 ml Net 867.92 ml Physical Examination:  
 
 
     
Constitutional:  No acute distress, cooperative, pleasant   
ENT:  Oral mucous moist, oropharynx benign. Neck supple, Resp:  CTA bilaterally. No wheezing/rhonchi/rales. No accessory muscle use CV:  Regular rhythm, normal rate, no murmurs, gallops, rubs GI:  Soft, non distended, non tender. normoactive bowel sounds, no hepatosplenomegaly Musculoskeletal:  No edema, Neurologic:  Moves all extremities. AAOx3, CN II-XII reviewed Skin:  diffuse chronic eczema all over Data Review:  
 Review and/or order of clinical lab test 
 
 
Labs:  
 
Recent Labs 05/12/19 
0689 05/11/19 
8690 WBC 4.5 5.8 HGB 9.8* 8.7* HCT 34.2* 30.3*  208 Recent Labs 05/13/19 
1117 05/12/19 
2992 05/11/19 
0417 05/10/19 
1855  146* 150* 151*  
K 4.0 3.5 3.6 4.6  112* 117* 119* CO2 28 31 25 24 BUN 7 7 11 13 CREA 0.64 0.58 0.84 0.77 * 76 65 22*  
CA 8.0* 7.8* 7.8* 8.8 MG  --   --   --  2.6* Recent Labs 05/10/19 
1855 SGOT 27 ALT 20 * TBILI 0.7 TP 7.5 ALB 2.4*  
GLOB 5.1* No results for input(s): INR, PTP, APTT in the last 72 hours. No lab exists for component: INREXT, INREXT No results for input(s): FE, TIBC, PSAT, FERR in the last 72 hours. No results found for: FOL, RBCF No results for input(s): PH, PCO2, PO2 in the last 72 hours. Recent Labs 05/11/19 
0417 05/10/19 
2219 TROIQ <0.05 <0.05 No results found for: CHOL, CHOLX, CHLST, CHOLV, HDL, LDL, LDLC, DLDLP, TGLX, TRIGL, TRIGP, CHHD, CHHDX Lab Results Component Value Date/Time Glucose (POC) 86 05/13/2019 10:18 AM  
 Glucose (POC) 74 05/13/2019 09:44 AM  
 Glucose (POC) 84 05/13/2019 07:49 AM  
 Glucose (POC) 93 05/13/2019 05:02 AM  
 Glucose (POC) 79 05/13/2019 04:39 AM  
 
Lab Results Component Value Date/Time Color DARK YELLOW 05/10/2019 04:54 PM  
 Appearance CLEAR 05/10/2019 04:54 PM  
 Specific gravity 1.022 05/10/2019 04:54 PM  
 pH (UA) 5.0 05/10/2019 04:54 PM  
 Protein NEGATIVE  05/10/2019 04:54 PM  
 Glucose NEGATIVE  05/10/2019 04:54 PM  
 Ketone TRACE (A) 05/10/2019 04:54 PM  
 Bilirubin NEGATIVE  11/23/2018 07:46 AM  
 Urobilinogen 1.0 05/10/2019 04:54 PM  
 Nitrites NEGATIVE  05/10/2019 04:54 PM  
 Leukocyte Esterase NEGATIVE  05/10/2019 04:54 PM  
 Epithelial cells FEW 11/23/2018 07:46 AM  
 Bacteria 2+ (A) 11/23/2018 07:46 AM  
 WBC 5-10 11/23/2018 07:46 AM  
 RBC 0-5 11/23/2018 07:46 AM  
 
 
 
Medications Reviewed:  
 
Current Facility-Administered Medications Medication Dose Route Frequency  dextrose 10% infusion  50 mL/hr IntraVENous CONTINUOUS  
 pantothenic ac-min oil-pet,hyd (AQUAPHOR) 41 % ointment   Topical BID  
 glucose chewable tablet 16 g  4 Tab Oral PRN  
 dextrose (D50W) injection syrg 12.5-25 g  12.5-25 g IntraVENous PRN  
  glucagon (GLUCAGEN) injection 1 mg  1 mg IntraMUSCular PRN  
 sodium chloride (NS) flush 5-40 mL  5-40 mL IntraVENous Q8H  
 sodium chloride (NS) flush 5-40 mL  5-40 mL IntraVENous PRN  
 acetaminophen (TYLENOL) tablet 650 mg  650 mg Oral Q4H PRN  
 heparin (porcine) injection 5,000 Units  5,000 Units SubCUTAneous Q8H  
 glucose chewable tablet 16 g  4 Tab Oral PRN  
 dextrose (D50W) injection syrg 12.5-25 g  12.5-25 g IntraVENous PRN  
 glucagon (GLUCAGEN) injection 1 mg  1 mg IntraMUSCular PRN  
 
______________________________________________________________________ EXPECTED LENGTH OF STAY: - - - 
ACTUAL LENGTH OF STAY:          2 Paradise Valenzuela MD

## 2019-05-14 PROBLEM — E16.2 HYPOGLYCEMIA: Status: RESOLVED | Noted: 2018-10-26 | Resolved: 2019-05-14

## 2019-05-14 PROBLEM — E87.0 HYPERNATREMIA: Status: RESOLVED | Noted: 2019-05-11 | Resolved: 2019-05-14

## 2019-05-14 LAB
GLUCOSE BLD STRIP.AUTO-MCNC: 102 MG/DL (ref 65–100)
GLUCOSE BLD STRIP.AUTO-MCNC: 132 MG/DL (ref 65–100)
GLUCOSE BLD STRIP.AUTO-MCNC: 148 MG/DL (ref 65–100)
GLUCOSE BLD STRIP.AUTO-MCNC: 64 MG/DL (ref 65–100)
GLUCOSE BLD STRIP.AUTO-MCNC: 66 MG/DL (ref 65–100)
GLUCOSE BLD STRIP.AUTO-MCNC: 73 MG/DL (ref 65–100)
GLUCOSE BLD STRIP.AUTO-MCNC: 82 MG/DL (ref 65–100)
SERVICE CMNT-IMP: ABNORMAL
SERVICE CMNT-IMP: NORMAL

## 2019-05-14 PROCEDURE — 97535 SELF CARE MNGMENT TRAINING: CPT

## 2019-05-14 PROCEDURE — 82962 GLUCOSE BLOOD TEST: CPT

## 2019-05-14 PROCEDURE — 65660000000 HC RM CCU STEPDOWN

## 2019-05-14 PROCEDURE — 97530 THERAPEUTIC ACTIVITIES: CPT

## 2019-05-14 PROCEDURE — 97110 THERAPEUTIC EXERCISES: CPT

## 2019-05-14 PROCEDURE — 74011000258 HC RX REV CODE- 258: Performed by: INTERNAL MEDICINE

## 2019-05-14 PROCEDURE — 74011250636 HC RX REV CODE- 250/636: Performed by: FAMILY MEDICINE

## 2019-05-14 PROCEDURE — 74011000250 HC RX REV CODE- 250: Performed by: FAMILY MEDICINE

## 2019-05-14 RX ADMIN — HEPARIN SODIUM 5000 UNITS: 5000 INJECTION INTRAVENOUS; SUBCUTANEOUS at 09:04

## 2019-05-14 RX ADMIN — HEPARIN SODIUM 5000 UNITS: 5000 INJECTION INTRAVENOUS; SUBCUTANEOUS at 17:44

## 2019-05-14 RX ADMIN — Medication 10 ML: at 13:57

## 2019-05-14 RX ADMIN — DEXTROSE MONOHYDRATE 12.5 G: 500 INJECTION PARENTERAL at 02:53

## 2019-05-14 RX ADMIN — HEPARIN SODIUM 5000 UNITS: 5000 INJECTION INTRAVENOUS; SUBCUTANEOUS at 02:53

## 2019-05-14 RX ADMIN — WHITE PETROLATUM: 1.75 OINTMENT TOPICAL at 17:44

## 2019-05-14 RX ADMIN — WHITE PETROLATUM: 1.75 OINTMENT TOPICAL at 09:07

## 2019-05-14 RX ADMIN — DEXTROSE MONOHYDRATE 12.5 G: 500 INJECTION PARENTERAL at 06:57

## 2019-05-14 RX ADMIN — DEXTROSE MONOHYDRATE 50 ML/HR: 10 INJECTION, SOLUTION INTRAVENOUS at 02:58

## 2019-05-14 RX ADMIN — Medication 10 ML: at 22:00

## 2019-05-14 NOTE — PROGRESS NOTES
[facility] declined accepting pt for skilled care citing that concern is payor source for LTC. Per discussion with pt, her plan is to go home. Will send to other nursing facilities for review including [facility], [facility], located within [facility], [facility], [place] and [place] via [address]. Also sent referral to [address] via [facility] [employer]. Elizabeth Vela, MSW 
 
 
10:10am  Received confirmation from [employer] from [facility] that they can accept her at discharge. Elizabeth Vela, MSW 
 
 
11am  CM received call from pt's niece Eulalio Carrillo who is the family member that pt trusts. Eulalio Carrillo isn't aware of her finances but she feels that pt would disclose these things to her if needed. Pt's  is very \"stubborn\" and won't share with pt's niece. Pt and her  won't allow help into the home and pt is possibly ashamed of the presence of her home. Eulalio Carrillo visits her often. Eulalio Carrillo also clarified that pt's  expects her to cook 3 meals a day. Also advised Eulalio Carrillo that APS can be called from SNF if pt discharges home. Eulalio Carrillo also has family members help but  refuses.  
 
Will try [facility] facilities per Jinny's (phone [phone])  request.   
 
Khushi Rose, MSW

## 2019-05-14 NOTE — DIABETES MGMT
DTC Progress Note Recommendations/ Comments: Chart review for hypoglycemia. Note po intake 50-75%. If hypoglycemia persists, may consider adding D5. Current hospital DM medication: none Chart reviewed on Jack Gonzales. Patient is a 80 y.o. female with no hx DM. A1c:  
Lab Results Component Value Date/Time Hemoglobin A1c 4.7 10/27/2018 02:48 AM  
 
 
Recent Glucose Results:  
Lab Results Component Value Date/Time GLUCPOC 102 (H) 05/14/2019 11:59 AM  
 GLUCPOC 132 (H) 05/14/2019 07:12 AM  
 GLUCPOC 64 (L) 05/14/2019 06:28 AM  
  
 
Lab Results Component Value Date/Time Creatinine 0.64 05/13/2019 11:17 AM  
 
CrCl cannot be calculated (Unknown ideal weight. ). Active Orders Diet DIET CARDIAC Mechanical Soft PO intake:  
Patient Vitals for the past 72 hrs: 
 % Diet Eaten 05/12/19 1754 50 % 05/12/19 1340 50 % 05/12/19 0929 75 % Will continue to follow as needed. Thank you Zo Bronson RD, CDE Time spent: 3 min

## 2019-05-14 NOTE — PROGRESS NOTES
Problem: Falls - Risk of 
Goal: *Absence of Falls Description Document Elina Lay Fall Risk and appropriate interventions in the flowsheet. Outcome: Progressing Towards Goal 
Note:  
Fall Risk Interventions: 
Mobility Interventions: PT Consult for mobility concerns, OT consult for ADLs, Utilize walker, cane, or other assistive device, Communicate number of staff needed for ambulation/transfer Mentation Interventions: Adequate sleep, hydration, pain control, Door open when patient unattended, Eyeglasses and hearing aids, More frequent rounding, Reorient patient Medication Interventions: Patient to call before getting OOB, Teach patient to arise slowly Elimination Interventions: Call light in reach, Toileting schedule/hourly rounds Problem: Pressure Injury - Risk of 
Goal: *Prevention of pressure injury Description Document Bon Scale and appropriate interventions in the flowsheet. Outcome: Progressing Towards Goal 
Note:  
Pressure Injury Interventions: 
Sensory Interventions: Assess changes in LOC, Assess need for specialty bed, Minimize linen layers, Maintain/enhance activity level, Turn and reposition approx. every two hours (pillows and wedges if needed) Moisture Interventions: Absorbent underpads, Apply protective barrier, creams and emollients, Assess need for specialty bed, Check for incontinence Q2 hours and as needed, Maintain skin hydration (lotion/cream), Minimize layers Activity Interventions: Pressure redistribution bed/mattress(bed type), PT/OT evaluation Mobility Interventions: Pressure redistribution bed/mattress (bed type), PT/OT evaluation, Turn and reposition approx. every two hours(pillow and wedges) Nutrition Interventions: Document food/fluid/supplement intake, Discuss nutritional consult with provider, Offer support with meals,snacks and hydration Friction and Shear Interventions: Lift sheet, Minimize layers
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
## 2019-05-14 NOTE — PROGRESS NOTES
Problem: General Medical Care Plan Goal: *Skin integrity maintained Outcome: Progressing Towards Goal 
Note:  
Skin care done, patient bathed, gown and linen changed.  Patient on turn team q2h

## 2019-05-14 NOTE — PROGRESS NOTES
Hospitalist Progress Note Mila Murrell MD 
Answering service: [phone] OR [phone] from in house phone Cell: [phone] Date of Service:  2019 NAME:  Margarita Thornton :  10/11/1932 MRN:  [id number] Admission Summary:  
 
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 The patient is a very poor historian, not much history could be obtained from her, although she is alert, oriented x3, but does not provide much history, reports that she has been having some decreased appetite and has not been feeling well. The patient reports that she has not taken a bath, has not been taking good care of herself since the time she was discharged from the hospital in 10/2018. The patient reports that she has had some chills, decreased appetite and has not been sleeping well. The patient reports that today \"she felt weaker than usual,\" got concerned and decided to come to the hospital 
 
Interval history / Subjective:  
  
 
2019 : 
Had a thorough cleaning by PT tarah/aid assit today  Seems personal hygiene has gone lacking for a good period of time. Assessment & Plan: Hypoglycemia due to poor oral intake 
-finger stick glucose dropped to 54 last night, on  D10 W at 50 ml/hr 
-encourage po intake 
-continue monitor finger stick glucose 
- off iv dextrose today Hypernatremia due to dehydration due to poor oral intake 
-improved with IVF to D10W at 50 ml hr, Na 137 
-repeat bmp in am resolved f/u lab for 5/15 am  
 
Hypothermia unclear etiology 
-afebrile, no leukocytosis  
-chest x ray no acute process 
-temp was 94.8  
-resolved, off niki hugger  
  
Hx of HTN 
-BP normal, add norvasc,monitor BP 
BP Readings from Last 1 Encounters:  
19 138/62  
  
  
 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 
-outpatient follow up at [facility] with neurosurgeon  
  
Chronic severe eczema  
-continue protective skin cream 
  
Debility 
-PT/OT Hx of recurrent fall  
-PT/OT 
-Fall precaution Severe protein calorie malnutrition  
-soft diet with nutritional supplement Lab Results Component Value Date/Time Protein, total 7.5 05/10/2019 06:55 PM  
 Albumin 2.4 (L) 05/10/2019 06:55 PM  
 pt is effectively edentulous, she does like supplements.  
  
Full Code; at risk for decompensation, palliative care team consulted 
  
Code status: Full Code DVT prophylaxis: heparin Care Plan discussed with: Patient/Family, Nurse and  Disposition: SNF/Rehab Hospital Problems  Date Reviewed: 5/11/2019 None Vital Signs:  
 Last 24hrs VS reviewed since prior progress note. Most recent are: 
Visit Vitals /62 (BP 1 Location: Left arm, BP Patient Position: Sitting) Pulse 87 Temp 97.6 °F (36.4 °C) Resp 14 Wt 47.6 kg (104 lb 15 oz) SpO2 100% BMI 21.20 kg/m² Intake/Output Summary (Last 24 hours) at 5/14/2019 1136 Last data filed at 5/13/2019 1148 Gross per 24 hour Intake 162.5 ml Output  Net 162.5 ml Physical Examination:  
 
 
    Stable exam  
Constitutional:  No acute distress, cooperative, pleasant   
ENT:  Oral mucous moist, oropharynx benign. Neck supple, Resp:  CTA bilaterally. No wheezing/rhonchi/rales. No accessory muscle use CV:  Regular rhythm, normal rate, no murmurs, gallops, rubs GI:  Soft, non distended, non tender. normoactive bowel sounds, no hepatosplenomegaly Musculoskeletal:  No edema, Neurologic:  Moves all extremities. AAOx3, CN II-XII reviewed Skin:  diffuse chronic eczema all over Data Review:  
 Review and/or order of clinical lab test 
 
 
Labs:  
 
Recent Labs 05/13/19 
1117 05/12/19 
5132 WBC 5.7 4.5 HGB 12.2 9.8* HCT 40.1 34.2*  
  159 Recent Labs 05/13/19 
1117 05/12/19 
8134  146*  
K 4.0 3.5  112* CO2 28 31 BUN 7 7 CREA 0.64 0.58 * 76  
CA 8.0* 7.8* No results for input(s): SGOT, GPT, ALT, AP, TBIL, TBILI, TP, ALB, GLOB, GGT, AML, LPSE in the last 72 hours. No lab exists for component: AMYP, HLPSE No results for input(s): INR, PTP, APTT in the last 72 hours. No lab exists for component: INREXT, INREXT No results for input(s): FE, TIBC, PSAT, FERR in the last 72 hours. No results found for: FOL, RBCF No results for input(s): PH, PCO2, PO2 in the last 72 hours. No results for input(s): CPK, CKNDX, TROIQ in the last 72 hours. No lab exists for component: CPKMB No results found for: CHOL, CHOLX, CHLST, CHOLV, HDL, LDL, LDLC, DLDLP, TGLX, TRIGL, TRIGP, CHHD, CHHDX Lab Results Component Value Date/Time Glucose (POC) 132 (H) 05/14/2019 07:12 AM  
 Glucose (POC) 64 (L) 05/14/2019 06:28 AM  
 Glucose (POC) 148 (H) 05/14/2019 03:22 AM  
 Glucose (POC) 66 05/14/2019 02:39 AM  
 Glucose (POC) 73 05/14/2019 02:22 AM  
 
Lab Results Component Value Date/Time Color DARK YELLOW 05/10/2019 04:54 PM  
 Appearance CLEAR 05/10/2019 04:54 PM  
 Specific gravity 1.022 05/10/2019 04:54 PM  
 pH (UA) 5.0 05/10/2019 04:54 PM  
 Protein NEGATIVE  05/10/2019 04:54 PM  
 Glucose NEGATIVE  05/10/2019 04:54 PM  
 Ketone TRACE (A) 05/10/2019 04:54 PM  
 Bilirubin NEGATIVE  11/23/2018 07:46 AM  
 Urobilinogen 1.0 05/10/2019 04:54 PM  
 Nitrites NEGATIVE  05/10/2019 04:54 PM  
 Leukocyte Esterase NEGATIVE  05/10/2019 04:54 PM  
 Epithelial cells FEW 11/23/2018 07:46 AM  
 Bacteria 2+ (A) 11/23/2018 07:46 AM  
 WBC 5-10 11/23/2018 07:46 AM  
 RBC 0-5 11/23/2018 07:46 AM  
 
 
 
Medications Reviewed:  
 
Current Facility-Administered Medications Medication Dose Route Frequency  pantothenic ac-min oil-pet,hyd (AQUAPHOR) 41 % ointment   Topical BID  
  glucose chewable tablet 16 g  4 Tab Oral PRN  
 dextrose (D50W) injection syrg 12.5-25 g  12.5-25 g IntraVENous PRN  
 glucagon (GLUCAGEN) injection 1 mg  1 mg IntraMUSCular PRN  
 sodium chloride (NS) flush 5-40 mL  5-40 mL IntraVENous Q8H  
 sodium chloride (NS) flush 5-40 mL  5-40 mL IntraVENous PRN  
 acetaminophen (TYLENOL) tablet 650 mg  650 mg Oral Q4H PRN  
 heparin (porcine) injection 5,000 Units  5,000 Units SubCUTAneous Q8H  
 glucose chewable tablet 16 g  4 Tab Oral PRN  
 dextrose (D50W) injection syrg 12.5-25 g  12.5-25 g IntraVENous PRN  
 glucagon (GLUCAGEN) injection 1 mg  1 mg IntraMUSCular PRN  
 
______________________________________________________________________ EXPECTED LENGTH OF STAY: 3d 7h 
ACTUAL LENGTH OF STAY:          3 Edson Lin MD

## 2019-05-14 NOTE — PROGRESS NOTES
Problem: Self Care Deficits Care Plan (Adult) Goal: *Acute Goals and Plan of Care (Insert Text) Description Occupational Therapy Goals Initiated 5/13/2019 1. Patient will perform grooming with modified independence standing at the sink within 7 day(s). 2.  Patient will perform upper body dressing and lower body dressing with supervision/set-up within 7 day(s). 3.  Patient will perform simple home management with supervision/set-up within 7 day(s). 4.  Patient will perform toilet transfers with modified independence within 7 day(s). 5.  Patient will perform all aspects of toileting with supervision/set-up within 7 day(s). 6.  Patient will participate in upper extremity therapeutic exercise/activities with supervision/set-up for 10 minutes in standing with < or = 2 rest breaks and supervision within 7 day(s). Outcome: Progressing Towards Goal 
 OCCUPATIONAL THERAPY TREATMENT Patient: Sherlyn Gustafson (80 y.o. female) Date: 5/14/2019 Diagnosis: Hypoglycemia [E16.2] Hypernatremia [E87.0] Hypoglycemia Precautions: Fall Chart, occupational therapy assessment, plan of care, and goals were reviewed. ASSESSMENT: 
Cleared by RN to see pt for therapy session. Pt received supine in bed, agreeable to participate. SBA for bed mobility with good sitting balance. Performed SPT transfer to [facility] and then to chair with min A using RW. Cues required for safe RW management, hand placement, and controlled descent when sitting. CGA-min A for balance during standing bladder hygiene. In chair pt performed grooming ADLs with setup. Reported feeling fatigued with light activity, vitals monitored and stable. Overall pt's functional performance limited from baseline by generalized weakness, impaired functional mobility and decreased balance. Recommend SNF rehab at discharge. Progression toward goals: 
?       Improving appropriately and progressing toward goals ?       Improving slowly and progressing toward goals ? Not making progress toward goals and plan of care will be adjusted PLAN: 
Patient continues to benefit from skilled intervention to address the above impairments. Continue treatment per established plan of care. Discharge Recommendations:  Dylan Cui Further Equipment Recommendations for Discharge:  TBD SUBJECTIVE:  
Patient stated ? I feel tired. ? OBJECTIVE DATA SUMMARY:  
Cognitive/Behavioral Status: 
Neurologic State: Alert Orientation Level: Oriented X4 Cognition: Follows commands Perception: Appears intact Perseveration: No perseveration noted Safety/Judgement: Fall prevention; Awareness of environment Functional Mobility and Transfers for ADLs: 
Bed Mobility: 
Supine to Sit: Stand-by assistance; Additional time; Adaptive equipment Transfers: 
Sit to Stand: Minimum assistance; Additional time; Adaptive equipment(RW) Functional Transfers Toilet Transfer : Minimum assistance; Additional time; Adaptive equipment(RW, SPT to [facility]) Bed to Chair: Minimum assistance; Adaptive equipment; Additional time Balance: 
 Sitting: Intact Standing: Impaired with support (Fair) ADL Intervention: 
  
 
Grooming Washing Face: Set-up; Supervision(seated in chair) Toileting Bladder Hygiene: Contact guard assistance;Minimum assistance(for standing balance) Cognitive Retraining Safety/Judgement: Fall prevention; Awareness of environment Pain: 
 Reported no pain during session Activity Tolerance:  
Fair Please refer to the flowsheet for vital signs taken during this treatment. After treatment:  
? Patient left in no apparent distress sitting up in chair ? Patient left in no apparent distress in bed 
? Call bell left within reach ? Nursing notified ? Caregiver present ? Bed alarm activated COMMUNICATION/COLLABORATION:  
The patient?s plan of care was discussed with: Physical Therapist and Registered Nurse Steve Jon, OT Time Calculation: 26 mins

## 2019-05-14 NOTE — PROGRESS NOTES
2100: patient resting in bed, no c/o pain. VSS, RA, D10 fluids running, check sugar q4h, they are still low, even with fluids going. 0300: patient bathed and skin care done, patient up to commode with assist x1-2, minor hip discomfort. 
 
0322: sugar 148, patient resting in bed. Bedside and Verbal shift change report given to [id number] (oncoming nurse) by Kavya Franklin (offgoing nurse).  Report included the following information SBAR, Kardex, MAR, Med Rec Status and Cardiac Rhythm SR.

## 2019-05-15 PROBLEM — E23.0 PANHYPOPITUITARISM (HCC): Status: ACTIVE | Noted: 2019-05-15

## 2019-05-15 PROBLEM — G93.89 SUPRASELLAR MASS: Status: ACTIVE | Noted: 2019-05-15

## 2019-05-15 LAB
ANION GAP SERPL CALC-SCNC: 5 MMOL/L (ref 5–15)
BASOPHILS # BLD: 0 K/UL (ref 0–0.1)
BASOPHILS NFR BLD: 0 % (ref 0–1)
BUN SERPL-MCNC: 8 MG/DL (ref 6–20)
BUN/CREAT SERPL: 14 (ref 12–20)
CALCIUM SERPL-MCNC: 7.8 MG/DL (ref 8.5–10.1)
CHLORIDE SERPL-SCNC: 107 MMOL/L (ref 97–108)
CO2 SERPL-SCNC: 29 MMOL/L (ref 21–32)
CORTIS PM SERPL-MCNC: 6.2 UG/DL (ref 3.44–16.7)
CORTIS SERPL-MCNC: 10.7 UG/DL
CREAT SERPL-MCNC: 0.56 MG/DL (ref 0.55–1.02)
DIFFERENTIAL METHOD BLD: ABNORMAL
EOSINOPHIL # BLD: 0.4 K/UL (ref 0–0.4)
EOSINOPHIL NFR BLD: 9 % (ref 0–7)
ERYTHROCYTE [DISTWIDTH] IN BLOOD BY AUTOMATED COUNT: 18.4 % (ref 11.5–14.5)
GLUCOSE BLD STRIP.AUTO-MCNC: 102 MG/DL (ref 65–100)
GLUCOSE BLD STRIP.AUTO-MCNC: 109 MG/DL (ref 65–100)
GLUCOSE BLD STRIP.AUTO-MCNC: 137 MG/DL (ref 65–100)
GLUCOSE BLD STRIP.AUTO-MCNC: 171 MG/DL (ref 65–100)
GLUCOSE BLD STRIP.AUTO-MCNC: 39 MG/DL (ref 65–100)
GLUCOSE BLD STRIP.AUTO-MCNC: 48 MG/DL (ref 65–100)
GLUCOSE BLD STRIP.AUTO-MCNC: 56 MG/DL (ref 65–100)
GLUCOSE SERPL-MCNC: 42 MG/DL (ref 65–100)
HCT VFR BLD AUTO: 30.6 % (ref 35–47)
HGB BLD-MCNC: 9.9 G/DL (ref 11.5–16)
IMM GRANULOCYTES # BLD AUTO: 0 K/UL (ref 0–0.04)
IMM GRANULOCYTES NFR BLD AUTO: 0 % (ref 0–0.5)
LYMPHOCYTES # BLD: 0.9 K/UL (ref 0.8–3.5)
LYMPHOCYTES NFR BLD: 20 % (ref 12–49)
MCH RBC QN AUTO: 26.3 PG (ref 26–34)
MCHC RBC AUTO-ENTMCNC: 32.4 G/DL (ref 30–36.5)
MCV RBC AUTO: 81.4 FL (ref 80–99)
MONOCYTES # BLD: 0.4 K/UL (ref 0–1)
MONOCYTES NFR BLD: 8 % (ref 5–13)
NEUTS SEG # BLD: 2.9 K/UL (ref 1.8–8)
NEUTS SEG NFR BLD: 63 % (ref 32–75)
NRBC # BLD: 0 K/UL (ref 0–0.01)
NRBC BLD-RTO: 0 PER 100 WBC
PLATELET # BLD AUTO: 159 K/UL (ref 150–400)
PMV BLD AUTO: 12.3 FL (ref 8.9–12.9)
POTASSIUM SERPL-SCNC: 3.8 MMOL/L (ref 3.5–5.1)
RBC # BLD AUTO: 3.76 M/UL (ref 3.8–5.2)
SERVICE CMNT-IMP: ABNORMAL
SODIUM SERPL-SCNC: 141 MMOL/L (ref 136–145)
T4 FREE SERPL-MCNC: 0.6 NG/DL (ref 0.8–1.5)
TSH SERPL DL<=0.05 MIU/L-ACNC: 2.97 UIU/ML (ref 0.36–3.74)
WBC # BLD AUTO: 4.7 K/UL (ref 3.6–11)

## 2019-05-15 PROCEDURE — 74011000250 HC RX REV CODE- 250: Performed by: FAMILY MEDICINE

## 2019-05-15 PROCEDURE — 74011250636 HC RX REV CODE- 250/636: Performed by: FAMILY MEDICINE

## 2019-05-15 PROCEDURE — 97530 THERAPEUTIC ACTIVITIES: CPT | Performed by: PHYSICAL THERAPIST

## 2019-05-15 PROCEDURE — 84443 ASSAY THYROID STIM HORMONE: CPT

## 2019-05-15 PROCEDURE — 80048 BASIC METABOLIC PNL TOTAL CA: CPT

## 2019-05-15 PROCEDURE — 74011250637 HC RX REV CODE- 250/637: Performed by: INTERNAL MEDICINE

## 2019-05-15 PROCEDURE — 74011250636 HC RX REV CODE- 250/636: Performed by: INTERNAL MEDICINE

## 2019-05-15 PROCEDURE — 97535 SELF CARE MNGMENT TRAINING: CPT

## 2019-05-15 PROCEDURE — 74011250637 HC RX REV CODE- 250/637: Performed by: HOSPITALIST

## 2019-05-15 PROCEDURE — 36415 COLL VENOUS BLD VENIPUNCTURE: CPT

## 2019-05-15 PROCEDURE — 85025 COMPLETE CBC W/AUTO DIFF WBC: CPT

## 2019-05-15 PROCEDURE — 84439 ASSAY OF FREE THYROXINE: CPT

## 2019-05-15 PROCEDURE — 97116 GAIT TRAINING THERAPY: CPT | Performed by: PHYSICAL THERAPIST

## 2019-05-15 PROCEDURE — 65660000000 HC RM CCU STEPDOWN

## 2019-05-15 PROCEDURE — 82962 GLUCOSE BLOOD TEST: CPT

## 2019-05-15 PROCEDURE — 82533 TOTAL CORTISOL: CPT

## 2019-05-15 RX ORDER — DEXAMETHASONE 4 MG/1
2 TABLET ORAL EVERY EVENING
Status: DISCONTINUED | OUTPATIENT
Start: 2019-05-15 | End: 2019-05-16

## 2019-05-15 RX ORDER — LEVOTHYROXINE SODIUM 25 UG/1
25 TABLET ORAL
Status: DISCONTINUED | OUTPATIENT
Start: 2019-05-15 | End: 2019-05-16

## 2019-05-15 RX ORDER — DEXAMETHASONE 4 MG/1
4 TABLET ORAL DAILY
Status: DISCONTINUED | OUTPATIENT
Start: 2019-05-15 | End: 2019-05-16

## 2019-05-15 RX ADMIN — WHITE PETROLATUM: 1.75 OINTMENT TOPICAL at 18:00

## 2019-05-15 RX ADMIN — DEXTROSE MONOHYDRATE 25 G: 500 INJECTION PARENTERAL at 06:44

## 2019-05-15 RX ADMIN — LEVOTHYROXINE SODIUM 25 MCG: 25 TABLET ORAL at 10:16

## 2019-05-15 RX ADMIN — WHITE PETROLATUM: 1.75 OINTMENT TOPICAL at 09:00

## 2019-05-15 RX ADMIN — HEPARIN SODIUM 5000 UNITS: 5000 INJECTION INTRAVENOUS; SUBCUTANEOUS at 01:00

## 2019-05-15 RX ADMIN — DEXAMETHASONE 2 MG: 1 TABLET ORAL at 17:23

## 2019-05-15 RX ADMIN — Medication 10 ML: at 07:01

## 2019-05-15 RX ADMIN — HEPARIN SODIUM 5000 UNITS: 5000 INJECTION INTRAVENOUS; SUBCUTANEOUS at 08:54

## 2019-05-15 RX ADMIN — HEPARIN SODIUM 5000 UNITS: 5000 INJECTION INTRAVENOUS; SUBCUTANEOUS at 17:23

## 2019-05-15 RX ADMIN — DEXTROSE MONOHYDRATE 25 G: 500 INJECTION PARENTERAL at 03:30

## 2019-05-15 RX ADMIN — DEXAMETHASONE 4 MG: 1 TABLET ORAL at 08:53

## 2019-05-15 NOTE — PROGRESS NOTES
Chart reviewed and spoke with MD.  Pt now awaiting lab results and may require 1-2 more hospital days. Pt is accepted at [facility] and niece indicated that would be her preference due to location and her ability to be present and help pt moving forward, specifically if/when pt moves back in with her  as there had been care needs at home. Updated [address] and pt aware and in agreement.  
 
SERAFIN Hoang

## 2019-05-15 NOTE — PROGRESS NOTES
Problem: Self Care Deficits Care Plan (Adult) Goal: *Acute Goals and Plan of Care (Insert Text) Description Occupational Therapy Goals Initiated 5/13/2019 1. Patient will perform grooming with modified independence standing at the sink within 7 day(s). 2.  Patient will perform upper body dressing and lower body dressing with supervision/set-up within 7 day(s). 3.  Patient will perform simple home management with supervision/set-up within 7 day(s). 4.  Patient will perform toilet transfers with modified independence within 7 day(s). 5.  Patient will perform all aspects of toileting with supervision/set-up within 7 day(s). 6.  Patient will participate in upper extremity therapeutic exercise/activities with supervision/set-up for 10 minutes in standing with < or = 2 rest breaks and supervision within 7 day(s). Outcome: Progressing Towards Goal 
 OCCUPATIONAL THERAPY TREATMENT Patient: Abundio Bower (80 y.o. female) Date: 5/15/2019 Diagnosis: Hypoglycemia [E16.2] Hypernatremia [E87.0] Hypoglycemia Precautions: Fall Chart, occupational therapy assessment, plan of care, and goals were reviewed. ASSESSMENT:  
The patient presents with Minimum assistance upper body ADLs, Maximum assistance lower body ADLs, and Minimum assistance assist functional mobility. Remains with fair insight and awareness for needing assist for fall prevention. Stood for 3 minutes with fair fatigue and constant support, fair balance. The following are barriers to ADL independence while in acute care:  
- Cognitive and/or behavioral: sequencing, safety awareness, insight into deficits and insight into abilities - Medical condition: ROM, strength, functional reach, functional endurance, sitting balance, standing balance and medical history   
- Other:    
 
Prior level of function: was living alone, however poor hygiene and awareness for need for assist. Has RW at home per patient PLAN: 
 Patient continues to benefit from skilled intervention to address the above impairments. Continue treatment per established plan of care. Recommend with staff: up in chair 3x daily, setup for grooming and hygiene, bathroom for toileting Recommend next OT session: bathroom mobility, standing endurance, bathing session Discharge recommendations: Rehab at skilled nursing facility (SNF) (to regain functional baseline patient requires rehab) If above is not an option then recommend: 24 hour skilled services 24 supervision Barriers to discharging home, in addition to above listed impairments: lives alone 
entry and exit into the home, patient will require physical assist from medical transport/ambulance 
level of physical assist required to maintain patient safety. Equipment recommendations for successful discharge (if) home: tbd, home in unsafe at this ttime SUBJECTIVE:  
Patient stated ? I have help from my friends and [employer]. ? OBJECTIVE DATA SUMMARY:  
Cognitive/Behavioral Status: 
Neurologic State: Alert Orientation Level: Oriented X4 Cognition: Follows commands;Memory loss Functional Mobility and Transfers for ADLs: 
Bed Mobility: 
Supine to Sit: Minimum assistance Transfers: 
Sit to Stand: Minimum assistance Balance: 
Sitting: Intact Standing: Impaired; With support Standing - Static: Constant support; Fair 
Standing - Dynamic : Fair ADL Intervention: 
  
 
Grooming Washing Face: Set-up Required moderate encouragement to participate, up to chair with min A, stoodf or 3 minutes with encouargement, no LOB, but constant support with min A at times, cues for RW management, sequencing and safety Upper Body Dressing Assistance Hospital Gown: Minimum  assistance Lower Body Dressing Assistance Socks: Maximum assistance Therapeutic Exercises:  
 
Pain: 
Pain in heels with standing Activity Tolerance:  
Fair and requires rest breaks Please refer to the flowsheet for vital signs taken during this treatment. After treatment patient left:  
Up in chair Call light within reach RN notified COMMUNICATION/COLLABORATION:  
The patient?s plan of care was discussed with: Physical Therapist and Registered Nurse Grey Meyer OT Time Calculation: 21 mins

## 2019-05-15 NOTE — PROGRESS NOTES
Bedside shift change report given to Freda Johnson RN (oncoming nurse) by Reji Schneider RN (offgoing nurse). Report included the following information SBAR.

## 2019-05-15 NOTE — PROGRESS NOTES
Problem: Pressure Injury - Risk of 
Goal: *Prevention of pressure injury Description Document Bon Scale and appropriate interventions in the flowsheet. Outcome: Progressing Towards Goal 
Note:  
Pressure Injury Interventions: 
Sensory Interventions: Assess need for specialty bed, Keep linens dry and wrinkle-free, Minimize linen layers, Maintain/enhance activity level, Turn and reposition approx. every two hours (pillows and wedges if needed) Moisture Interventions: Absorbent underpads, Apply protective barrier, creams and emollients, Assess need for specialty bed, Check for incontinence Q2 hours and as needed, Minimize layers, Maintain skin hydration (lotion/cream) Activity Interventions: Assess need for specialty bed, Increase time out of bed, Pressure redistribution bed/mattress(bed type), PT/OT evaluation Mobility Interventions: Pressure redistribution bed/mattress (bed type), PT/OT evaluation, Turn and reposition approx. every two hours(pillow and wedges) Nutrition Interventions: Document food/fluid/supplement intake, Discuss nutritional consult with provider, Offer support with meals,snacks and hydration Friction and Shear Interventions: Lift sheet Problem: General Medical Care Plan Goal: *Vital signs within specified parameters Outcome: Progressing Towards Goal 
  
Problem: Falls - Risk of 
Goal: *Absence of Falls Description Document Sergey Bashir Fall Risk and appropriate interventions in the flowsheet. Note:  
Fall Risk Interventions: 
Mobility Interventions: Communicate number of staff needed for ambulation/transfer, PT Consult for mobility concerns, Patient to call before getting OOB, OT consult for ADLs Mentation Interventions: Door open when patient unattended, More frequent rounding, Reorient patient, Adequate sleep, hydration, pain control Medication Interventions: Patient to call before getting OOB, Teach patient to arise slowly Elimination Interventions: Call light in reach

## 2019-05-15 NOTE — PROGRESS NOTES
Brief: Suspect, now knowing pt for less than 24 hours, that she is pan hypopit and will need full endocrine replacement; but await confirmatory lab draw: pt has suprasellar mass on CT in 2018, low free t4 and TSH in past,   Once lab drawn will start replacements and the hypoglycemia will likely resolve. Jose De Jesus Alejo MD 5/15/2019

## 2019-05-15 NOTE — PROGRESS NOTES
Hospitalist Progress Note Lamont Hill MD 
Answering service: [phone] OR [phone] from in house phone Cell: [phone] Date of Service:  5/15/2019 NAME:  Jack Gonzales :  10/11/1932 MRN:  [id number] Admission Summary:  
 
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 The patient is a very poor historian, not much history could be obtained from her, although she is alert, oriented x3, but does not provide much history, reports that she has been having some decreased appetite and has not been feeling well. The patient reports that she has not taken a bath, has not been taking good care of herself since the time she was discharged from the hospital in 10/2018. The patient reports that she has had some chills, decreased appetite and has not been sleeping well. The patient reports that today \"she felt weaker than usual,\" got concerned and decided to come to the hospital 
 
Interval history / Subjective:  
  
 
5/15/2019 : 
 
Cont low bs' Chart suggest pan hypopit, lab collected supports, replacements started,   
 
Assessment & Plan: Hypoglycemia due to poor oral intake 
-finger stick glucose dropped to 54 last night, on  D10 W at 50 ml/hr 
-encourage po intake 
-continue monitor finger stick glucose 
- off iv dextrose today Hypernatremia due to dehydration due to poor oral intake 
-improved with IVF to D10W at 50 ml hr, Na 137 
-repeat bmp in am resolved f/u lab for 5/15 am  
 
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    F/u TSH however is reasonable as started low intro dose 5/15/2019 Hypothermia unclear etiology 
-afebrile, no leukocytosis  
-chest x ray no acute process -temp was 94.8 5/12 
-resolved, off niki hugger  
  
Hx of HTN 
-BP normal, add norvasc,monitor BP 
BP Readings from Last 1 Encounters:  
05/15/19 134/60  
  
  
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 
-outpatient follow up at [facility] with neurosurgeon  
  
Chronic severe eczema  
-continue protective skin cream 
  
Debility 
-PT/OT Hx of recurrent fall  
-PT/OT 
-Fall precaution Severe protein calorie malnutrition  
-soft diet with nutritional supplement Lab Results Component Value Date/Time Protein, total 7.5 05/10/2019 06:55 PM  
 Albumin 2.4 (L) 05/10/2019 06:55 PM  
 pt is effectively edentulous, she does like supplements.  
  
Full Code; at risk for decompensation, palliative care team consulted 
  
Code status: Full Code DVT prophylaxis: heparin Care Plan discussed with: Patient/Family, Nurse and  Disposition: SNF/Rehab Hospital Problems  Date Reviewed: 5/11/2019 Codes Class Noted POA Panhypopituitarism ([facility] [address] ICD-10-CM: E23.0 ICD-9-CM: 253.2  5/15/2019 Unknown Suprasellar mass ICD-10-CM: R22.0 ICD-9-CM: 784.2  5/15/2019 Unknown Vital Signs:  
 Last 24hrs VS reviewed since prior progress note. Most recent are: 
Visit Vitals /60 (BP 1 Location: Left arm, BP Patient Position: At rest) Pulse 75 Temp 97.9 °F (36.6 °C) Resp 16 Wt 47.6 kg (104 lb 15 oz) SpO2 100% BMI 21.20 kg/m² Intake/Output Summary (Last 24 hours) at 5/15/2019 1312 Last data filed at 5/15/2019 1018 Gross per 24 hour Intake 540 ml Output 200 ml Net 340 ml Physical Examination:  
 
 
    Stable exam  
Constitutional:  No acute distress, cooperative, pleasant   
ENT:  Oral mucous moist, oropharynx benign. Neck supple, Resp:  CTA bilaterally. No wheezing/rhonchi/rales. No accessory muscle use CV:  Regular rhythm, normal rate, no murmurs, gallops, rubs GI:  Soft, non distended, non tender. normoactive bowel sounds, no hepatosplenomegaly Musculoskeletal:  No edema, Neurologic:  Moves all extremities. AAOx3, CN II-XII reviewed Skin:  diffuse chronic eczema all over Data Review:  
 Review and/or order of clinical lab test 
 
 
Labs:  
 
Recent Labs 05/15/19 
0645 05/13/19 
1117 WBC 4.7 5.7 HGB 9.9* 12.2 HCT 30.6* 40.1  211 Recent Labs 05/15/19 
0645 05/13/19 
1117  137  
K 3.8 4.0  
 103 CO2 29 28 BUN 8 7 CREA 0.56 0.64 GLU 42* 143* CA 7.8* 8.0* No results for input(s): SGOT, GPT, ALT, AP, TBIL, TBILI, TP, ALB, GLOB, GGT, AML, LPSE in the last 72 hours. No lab exists for component: AMYP, HLPSE No results for input(s): INR, PTP, APTT in the last 72 hours. No lab exists for component: INREXT, INREXT No results for input(s): FE, TIBC, PSAT, FERR in the last 72 hours. No results found for: FOL, RBCF No results for input(s): PH, PCO2, PO2 in the last 72 hours. No results for input(s): CPK, CKNDX, TROIQ in the last 72 hours. No lab exists for component: CPKMB No results found for: CHOL, CHOLX, CHLST, CHOLV, HDL, LDL, LDLC, DLDLP, TGLX, TRIGL, TRIGP, CHHD, CHHDX Lab Results Component Value Date/Time Glucose (POC) 109 (H) 05/15/2019 10:16 AM  
 Glucose (POC) 137 (H) 05/15/2019 07:24 AM  
 Glucose (POC) 48 (LL) 05/15/2019 06:37 AM  
 Glucose (POC) 39 (LL) 05/15/2019 06:36 AM  
 Glucose (POC) 171 (H) 05/15/2019 03:53 AM  
 
Lab Results Component Value Date/Time  Color DARK YELLOW 05/10/2019 04:54 PM  
 Appearance CLEAR 05/10/2019 04:54 PM  
 Specific gravity 1.022 05/10/2019 04:54 PM  
 pH (UA) 5.0 05/10/2019 04:54 PM  
 Protein NEGATIVE  05/10/2019 04:54 PM  
 Glucose NEGATIVE  05/10/2019 04:54 PM  
 Ketone TRACE (A) 05/10/2019 04:54 PM  
 Bilirubin NEGATIVE  11/23/2018 07:46 AM  
 Urobilinogen 1.0 05/10/2019 04:54 PM  
 Nitrites NEGATIVE  05/10/2019 04:54 PM  
 Leukocyte Esterase NEGATIVE  05/10/2019 04:54 PM  
 Epithelial cells FEW 11/23/2018 07:46 AM  
 Bacteria 2+ (A) 11/23/2018 07:46 AM  
 WBC 5-10 11/23/2018 07:46 AM  
 RBC 0-5 11/23/2018 07:46 AM  
 
 
 
Medications Reviewed:  
 
Current Facility-Administered Medications Medication Dose Route Frequency  dexamethasone (DECADRON) tablet 4 mg  4 mg Oral DAILY  dexamethasone (DECADRON) tablet 2 mg  2 mg Oral QPM  
 levothyroxine (SYNTHROID) tablet 25 mcg  25 mcg Oral ACB  pantothenic ac-min oil-pet,hyd (AQUAPHOR) 41 % ointment   Topical BID  
 glucose chewable tablet 16 g  4 Tab Oral PRN  
 dextrose (D50W) injection syrg 12.5-25 g  12.5-25 g IntraVENous PRN  
 glucagon (GLUCAGEN) injection 1 mg  1 mg IntraMUSCular PRN  
 sodium chloride (NS) flush 5-40 mL  5-40 mL IntraVENous Q8H  
 sodium chloride (NS) flush 5-40 mL  5-40 mL IntraVENous PRN  
 acetaminophen (TYLENOL) tablet 650 mg  650 mg Oral Q4H PRN  
 heparin (porcine) injection 5,000 Units  5,000 Units SubCUTAneous Q8H  
 
______________________________________________________________________ EXPECTED LENGTH OF STAY: 3d 7h 
ACTUAL LENGTH OF STAY:          4 Johan Mathis MD

## 2019-05-15 NOTE — PROGRESS NOTES
Problem: Mobility Impaired (Adult and Pediatric) Goal: *Acute Goals and Plan of Care (Insert Text) Description Physical Therapy Goals 5/15/2019 1. Patient will move from supine to sit and sit to supine , scoot up and down and roll side to side in bed with supervision/set-up within 7 day(s). 2.  Patient will transfer from bed to chair and chair to bed with supervision/set-up using the least restrictive device within 7 day(s). 3.  Patient will perform sit to stand with supervision/set-up within 7 day(s). 4.  Patient will ambulate with supervision/set-up for 200 feet with the least restrictive device within 7 day(s). 5/15/2019 1332 by Herschel Boas, PT Outcome: Progressing Towards Goal 
 
PHYSICAL THERAPY TREATMENT Patient: Amisha Pablo (80 y.o. female) Date: 5/15/2019 Diagnosis: Hypoglycemia [E16.2] Hypernatremia [E87.0] Hypoglycemia Precautions: Fall Chart, physical therapy assessment, plan of care and goals were reviewed. ASSESSMENT: 
Based on the objective data described below, the patient presents with Contact guard assistance level overall for transfers. Gait training completed at Contact guard assistance, 150 feet and using a gait belt and rolling walker. Patient is confused. Requires CGA for safety. Ambulated in the hallway and then toileted and washed her hands at the sink with supervision. The following are barriers to independence while in acute care:  
-Cognitive and/or behavioral: orientation 
-Medical condition: functional endurance   
-Other:    
 
Prior level of function: Ambulated with a RW. PLAN: 
Patient continues to benefit from skilled intervention to address the above impairments. Continue treatment per established plan of care. Recommend with staff: Ambulate to the bathroom with assist.  
Recommend next PT session: Continue with ambulation with a RW. Discharge recommendations: Rehab at skilled nursing facility (SNF) (to regain functional baseline patient requires rehab) If above is not an option then recommend: Rehab at skilled nursing facility (SNF) (to regain functional baseline patient requires rehab) Patient's barriers to discharging home, in addition to above impairments: level of physical assist required to maintain patient safety. Equipment recommendations for successful discharge (if) home: none SUBJECTIVE:  
Patient stated ? I'm so cold. ? OBJECTIVE DATA SUMMARY:  
Critical Behavior: 
Neurologic State: Alert Orientation Level: Oriented X4 Cognition: Follows commands, Memory loss Safety/Judgement: Fall prevention, Awareness of environment Functional Mobility Training: 
Bed Mobility: 
  
Supine to Sit: Minimum assistance Transfers: 
Sit to Stand: Contact guard assistance Stand to Sit: Contact guard assistance Balance: 
Sitting: Intact Standing: Intact; With support Standing - Static: Constant support;Good Standing - Dynamic : Constant support; Vivian Grandee Ambulation/Gait Training: 
Distance (ft): 150 Feet (ft) Assistive Device: Walker, rolling;Gait belt Ambulation - Level of Assistance: Contact guard assistance Gait Abnormalities: Decreased step clearance Base of Support: Narrowed Speed/Malorie: Pace decreased (<100 feet/min) Step Length: Right shortened;Left shortened Patient ambulated 150 feet with a RW. Ambulated around the floor, back to the chair, and then toileted with CGA. Activity Tolerance:  
Good Please refer to the flowsheet for vital signs taken during this treatment. After treatment patient left:  
Up in chair Call light within reach COMMUNICATION/COLLABORATION:  
The patient?s plan of care was discussed with: Occupational Therapist and Registered Nurse Shannon Lemus, PT Time Calculation: 27 mins

## 2019-05-15 NOTE — DIABETES MGMT
DTC Progress Note Recommendations/ Comments: Chart review for hypoglycemia. Pt with FPG <50 mg/dl this am. Per chart review, appears she is having endocrine work-up. ' If appropriate, please consider:  
Provide HS snack as tolerated/desired Consider low rate D5 if hypoglycemia persists Current hospital DM medication: none Chart reviewed on [facility]. Patient is a 80 y.o. female with no hx DM. A1c:  
Lab Results Component Value Date/Time Hemoglobin A1c 4.7 10/27/2018 02:48 AM  
 
 
Recent Glucose Results:  
Lab Results Component Value Date/Time GLU 42 (LL) 05/15/2019 06:45 AM  
 GLUCPOC 109 (H) 05/15/2019 10:16 AM  
 GLUCPOC 137 (H) 05/15/2019 07:24 AM  
 GLUCPOC 48 (LL) 05/15/2019 06:37 AM  
  
 
Lab Results Component Value Date/Time Creatinine 0.56 05/15/2019 06:45 AM  
 
CrCl cannot be calculated (Unknown ideal weight. ). Active Orders Diet DIET CARDIAC Mechanical Soft PO intake:  
Patient Vitals for the past 72 hrs: 
 % Diet Eaten 05/15/19 1018 100 % 05/15/19 0823 90 % 05/12/19 1754 50 % Will continue to follow as needed. Thank you Freddy Lee RD Time spent: 4 minutes

## 2019-05-16 ENCOUNTER — APPOINTMENT (OUTPATIENT)
Dept: CT IMAGING | Age: 84
DRG: 640 | End: 2019-05-16
Attending: NURSE PRACTITIONER
Payer: MEDICARE

## 2019-05-16 ENCOUNTER — APPOINTMENT (OUTPATIENT)
Dept: GENERAL RADIOLOGY | Age: 84
DRG: 640 | End: 2019-05-16
Attending: INTERNAL MEDICINE
Payer: MEDICARE

## 2019-05-16 LAB
ALBUMIN SERPL-MCNC: 2.1 G/DL (ref 3.5–5)
ALBUMIN/GLOB SERPL: 0.5 {RATIO} (ref 1.1–2.2)
ALP SERPL-CCNC: 393 U/L (ref 45–117)
ALT SERPL-CCNC: 50 U/L (ref 12–78)
ANION GAP SERPL CALC-SCNC: 6 MMOL/L (ref 5–15)
APPEARANCE UR: ABNORMAL
ARTERIAL PATENCY WRIST A: YES
AST SERPL-CCNC: 93 U/L (ref 15–37)
BACTERIA URNS QL MICRO: ABNORMAL /HPF
BASE EXCESS BLD CALC-SCNC: 2 MMOL/L
BASOPHILS # BLD: 0 K/UL (ref 0–0.1)
BASOPHILS NFR BLD: 0 % (ref 0–1)
BDY SITE: ABNORMAL
BILIRUB SERPL-MCNC: 0.6 MG/DL (ref 0.2–1)
BILIRUB UR QL: NEGATIVE
BUN SERPL-MCNC: 10 MG/DL (ref 6–20)
BUN/CREAT SERPL: 15 (ref 12–20)
CALCIUM SERPL-MCNC: 8.6 MG/DL (ref 8.5–10.1)
CHLORIDE SERPL-SCNC: 109 MMOL/L (ref 97–108)
CO2 SERPL-SCNC: 28 MMOL/L (ref 21–32)
COLOR UR: ABNORMAL
CREAT SERPL-MCNC: 0.67 MG/DL (ref 0.55–1.02)
DIFFERENTIAL METHOD BLD: ABNORMAL
EOSINOPHIL # BLD: 0 K/UL (ref 0–0.4)
EOSINOPHIL NFR BLD: 0 % (ref 0–7)
EPITH CASTS URNS QL MICRO: ABNORMAL /LPF
ERYTHROCYTE [DISTWIDTH] IN BLOOD BY AUTOMATED COUNT: 18.3 % (ref 11.5–14.5)
GAS FLOW.O2 O2 DELIVERY SYS: ABNORMAL L/MIN
GLOBULIN SER CALC-MCNC: 4.3 G/DL (ref 2–4)
GLUCOSE BLD STRIP.AUTO-MCNC: 100 MG/DL (ref 65–100)
GLUCOSE BLD STRIP.AUTO-MCNC: 128 MG/DL (ref 65–100)
GLUCOSE BLD STRIP.AUTO-MCNC: 158 MG/DL (ref 65–100)
GLUCOSE BLD STRIP.AUTO-MCNC: 74 MG/DL (ref 65–100)
GLUCOSE BLD STRIP.AUTO-MCNC: 85 MG/DL (ref 65–100)
GLUCOSE BLD STRIP.AUTO-MCNC: 89 MG/DL (ref 65–100)
GLUCOSE BLD STRIP.AUTO-MCNC: 92 MG/DL (ref 65–100)
GLUCOSE SERPL-MCNC: 73 MG/DL (ref 65–100)
GLUCOSE UR STRIP.AUTO-MCNC: NEGATIVE MG/DL
HCO3 BLD-SCNC: 26.5 MMOL/L (ref 22–26)
HCT VFR BLD AUTO: 35.5 % (ref 35–47)
HGB BLD-MCNC: 11.2 G/DL (ref 11.5–16)
HGB UR QL STRIP: NEGATIVE
IMM GRANULOCYTES # BLD AUTO: 0 K/UL (ref 0–0.04)
IMM GRANULOCYTES NFR BLD AUTO: 0 % (ref 0–0.5)
KETONES UR QL STRIP.AUTO: NEGATIVE MG/DL
LACTATE SERPL-SCNC: 1.5 MMOL/L (ref 0.4–2)
LACTATE SERPL-SCNC: 2.7 MMOL/L (ref 0.4–2)
LEUKOCYTE ESTERASE UR QL STRIP.AUTO: NEGATIVE
LYMPHOCYTES # BLD: 0.6 K/UL (ref 0.8–3.5)
LYMPHOCYTES NFR BLD: 9 % (ref 12–49)
MAGNESIUM SERPL-MCNC: 2.1 MG/DL (ref 1.6–2.4)
MCH RBC QN AUTO: 26.3 PG (ref 26–34)
MCHC RBC AUTO-ENTMCNC: 31.5 G/DL (ref 30–36.5)
MCV RBC AUTO: 83.3 FL (ref 80–99)
MONOCYTES # BLD: 0.4 K/UL (ref 0–1)
MONOCYTES NFR BLD: 6 % (ref 5–13)
NEUTS SEG # BLD: 6 K/UL (ref 1.8–8)
NEUTS SEG NFR BLD: 85 % (ref 32–75)
NITRITE UR QL STRIP.AUTO: NEGATIVE
NRBC # BLD: 0 K/UL (ref 0–0.01)
NRBC BLD-RTO: 0 PER 100 WBC
PCO2 BLD: 40.1 MMHG (ref 35–45)
PH BLD: 7.43 [PH] (ref 7.35–7.45)
PH UR STRIP: 7 [PH] (ref 5–8)
PHOSPHATE SERPL-MCNC: 3.2 MG/DL (ref 2.6–4.7)
PLATELET # BLD AUTO: 148 K/UL (ref 150–400)
PO2 BLD: 98 MMHG (ref 80–100)
POTASSIUM SERPL-SCNC: 3.9 MMOL/L (ref 3.5–5.1)
PROT SERPL-MCNC: 6.4 G/DL (ref 6.4–8.2)
PROT UR STRIP-MCNC: NEGATIVE MG/DL
RBC # BLD AUTO: 4.26 M/UL (ref 3.8–5.2)
RBC #/AREA URNS HPF: ABNORMAL /HPF (ref 0–5)
RBC MORPH BLD: ABNORMAL
SAO2 % BLD: 98 % (ref 92–97)
SERVICE CMNT-IMP: ABNORMAL
SERVICE CMNT-IMP: ABNORMAL
SERVICE CMNT-IMP: NORMAL
SODIUM SERPL-SCNC: 143 MMOL/L (ref 136–145)
SP GR UR REFRACTOMETRY: 1.01 (ref 1–1.03)
SPECIMEN TYPE: ABNORMAL
UA: UC IF INDICATED,UAUC: ABNORMAL
UROBILINOGEN UR QL STRIP.AUTO: 1 EU/DL (ref 0.2–1)
WBC # BLD AUTO: 7 K/UL (ref 3.6–11)
WBC URNS QL MICRO: ABNORMAL /HPF (ref 0–4)

## 2019-05-16 PROCEDURE — 84100 ASSAY OF PHOSPHORUS: CPT

## 2019-05-16 PROCEDURE — 74011250637 HC RX REV CODE- 250/637: Performed by: INTERNAL MEDICINE

## 2019-05-16 PROCEDURE — 74011250636 HC RX REV CODE- 250/636: Performed by: FAMILY MEDICINE

## 2019-05-16 PROCEDURE — 83605 ASSAY OF LACTIC ACID: CPT

## 2019-05-16 PROCEDURE — 97110 THERAPEUTIC EXERCISES: CPT

## 2019-05-16 PROCEDURE — 83735 ASSAY OF MAGNESIUM: CPT

## 2019-05-16 PROCEDURE — 82803 BLOOD GASES ANY COMBINATION: CPT

## 2019-05-16 PROCEDURE — 74011250637 HC RX REV CODE- 250/637: Performed by: HOSPITALIST

## 2019-05-16 PROCEDURE — 71045 X-RAY EXAM CHEST 1 VIEW: CPT

## 2019-05-16 PROCEDURE — 74011250636 HC RX REV CODE- 250/636: Performed by: NURSE PRACTITIONER

## 2019-05-16 PROCEDURE — 82962 GLUCOSE BLOOD TEST: CPT

## 2019-05-16 PROCEDURE — 36415 COLL VENOUS BLD VENIPUNCTURE: CPT

## 2019-05-16 PROCEDURE — 87040 BLOOD CULTURE FOR BACTERIA: CPT

## 2019-05-16 PROCEDURE — 80053 COMPREHEN METABOLIC PANEL: CPT

## 2019-05-16 PROCEDURE — 85025 COMPLETE CBC W/AUTO DIFF WBC: CPT

## 2019-05-16 PROCEDURE — 65660000001 HC RM ICU INTERMED STEPDOWN

## 2019-05-16 PROCEDURE — 77030013079 HC BLNKT BAIR HGGR 3M -A

## 2019-05-16 PROCEDURE — 81001 URINALYSIS AUTO W/SCOPE: CPT

## 2019-05-16 PROCEDURE — 97530 THERAPEUTIC ACTIVITIES: CPT

## 2019-05-16 PROCEDURE — 36600 WITHDRAWAL OF ARTERIAL BLOOD: CPT

## 2019-05-16 PROCEDURE — 74011000258 HC RX REV CODE- 258: Performed by: INTERNAL MEDICINE

## 2019-05-16 PROCEDURE — 74011250636 HC RX REV CODE- 250/636: Performed by: INTERNAL MEDICINE

## 2019-05-16 PROCEDURE — 87086 URINE CULTURE/COLONY COUNT: CPT

## 2019-05-16 RX ORDER — HALOPERIDOL 5 MG/ML
0.5 INJECTION INTRAMUSCULAR ONCE
Status: DISCONTINUED | OUTPATIENT
Start: 2019-05-17 | End: 2019-05-16

## 2019-05-16 RX ORDER — SODIUM CHLORIDE 9 MG/ML
50 INJECTION, SOLUTION INTRAVENOUS CONTINUOUS
Status: DISCONTINUED | OUTPATIENT
Start: 2019-05-16 | End: 2019-05-22

## 2019-05-16 RX ORDER — HYDROCORTISONE 10 MG/1
10 TABLET ORAL
Status: DISCONTINUED | OUTPATIENT
Start: 2019-05-17 | End: 2019-05-22 | Stop reason: HOSPADM

## 2019-05-16 RX ORDER — LANOLIN ALCOHOL/MO/W.PET/CERES
100 CREAM (GRAM) TOPICAL DAILY
Status: DISCONTINUED | OUTPATIENT
Start: 2019-05-16 | End: 2019-05-22 | Stop reason: HOSPADM

## 2019-05-16 RX ORDER — HALOPERIDOL 5 MG/ML
0.5 INJECTION INTRAMUSCULAR ONCE
Status: COMPLETED | OUTPATIENT
Start: 2019-05-17 | End: 2019-05-16

## 2019-05-16 RX ORDER — ASCORBIC ACID 500 MG
500 TABLET ORAL DAILY
Status: DISCONTINUED | OUTPATIENT
Start: 2019-05-16 | End: 2019-05-22 | Stop reason: HOSPADM

## 2019-05-16 RX ORDER — HYDROCORTISONE 10 MG/1
20 TABLET ORAL
Status: DISCONTINUED | OUTPATIENT
Start: 2019-05-17 | End: 2019-05-22 | Stop reason: HOSPADM

## 2019-05-16 RX ORDER — HYDROCORTISONE 10 MG/1
10 TABLET ORAL
Status: DISCONTINUED | OUTPATIENT
Start: 2019-05-16 | End: 2019-05-16

## 2019-05-16 RX ORDER — FOLIC ACID 1 MG/1
1 TABLET ORAL DAILY
Status: DISCONTINUED | OUTPATIENT
Start: 2019-05-16 | End: 2019-05-22 | Stop reason: HOSPADM

## 2019-05-16 RX ORDER — LEVOTHYROXINE SODIUM 25 UG/1
50 TABLET ORAL
Status: DISCONTINUED | OUTPATIENT
Start: 2019-05-16 | End: 2019-05-18

## 2019-05-16 RX ADMIN — CEFEPIME 2 G: 2 INJECTION, POWDER, FOR SOLUTION INTRAVENOUS at 08:37

## 2019-05-16 RX ADMIN — Medication 1 TABLET: at 11:59

## 2019-05-16 RX ADMIN — Medication 10 ML: at 14:41

## 2019-05-16 RX ADMIN — SODIUM CHLORIDE 50 ML/HR: 900 INJECTION, SOLUTION INTRAVENOUS at 11:00

## 2019-05-16 RX ADMIN — SODIUM CHLORIDE 419 ML: 900 INJECTION, SOLUTION INTRAVENOUS at 10:17

## 2019-05-16 RX ADMIN — Medication 10 ML: at 21:48

## 2019-05-16 RX ADMIN — SODIUM CHLORIDE 1000 ML: 900 INJECTION, SOLUTION INTRAVENOUS at 08:38

## 2019-05-16 RX ADMIN — HALOPERIDOL LACTATE 0.5 MG: 5 INJECTION, SOLUTION INTRAMUSCULAR at 23:49

## 2019-05-16 RX ADMIN — OXYCODONE HYDROCHLORIDE AND ACETAMINOPHEN 500 MG: 500 TABLET ORAL at 11:59

## 2019-05-16 RX ADMIN — METHYLPREDNISOLONE SODIUM SUCCINATE 40 MG: 40 INJECTION, POWDER, FOR SOLUTION INTRAMUSCULAR; INTRAVENOUS at 21:47

## 2019-05-16 RX ADMIN — METHYLPREDNISOLONE SODIUM SUCCINATE 40 MG: 40 INJECTION, POWDER, FOR SOLUTION INTRAMUSCULAR; INTRAVENOUS at 08:37

## 2019-05-16 RX ADMIN — LEVOTHYROXINE SODIUM 50 MCG: 50 TABLET ORAL at 06:58

## 2019-05-16 RX ADMIN — Medication 100 MG: at 11:59

## 2019-05-16 RX ADMIN — HEPARIN SODIUM 5000 UNITS: 5000 INJECTION INTRAVENOUS; SUBCUTANEOUS at 08:38

## 2019-05-16 RX ADMIN — CEFEPIME 2 G: 2 INJECTION, POWDER, FOR SOLUTION INTRAVENOUS at 21:47

## 2019-05-16 RX ADMIN — WHITE PETROLATUM: 1.75 OINTMENT TOPICAL at 17:19

## 2019-05-16 RX ADMIN — WHITE PETROLATUM: 1.75 OINTMENT TOPICAL at 08:50

## 2019-05-16 RX ADMIN — SODIUM CHLORIDE 50 ML/HR: 900 INJECTION, SOLUTION INTRAVENOUS at 07:24

## 2019-05-16 RX ADMIN — HEPARIN SODIUM 5000 UNITS: 5000 INJECTION INTRAVENOUS; SUBCUTANEOUS at 17:17

## 2019-05-16 RX ADMIN — METHYLPREDNISOLONE SODIUM SUCCINATE 40 MG: 40 INJECTION, POWDER, FOR SOLUTION INTRAMUSCULAR; INTRAVENOUS at 14:40

## 2019-05-16 RX ADMIN — FOLIC ACID 1 MG: 1 TABLET ORAL at 11:59

## 2019-05-16 RX ADMIN — Medication 10 ML: at 06:58

## 2019-05-16 RX ADMIN — VANCOMYCIN HYDROCHLORIDE 1000 MG: 1 INJECTION, POWDER, LYOPHILIZED, FOR SOLUTION INTRAVENOUS at 09:41

## 2019-05-16 RX ADMIN — Medication 23.7 MG: at 11:59

## 2019-05-16 NOTE — PROGRESS NOTES
Problem: Self Care Deficits Care Plan (Adult) Goal: *Acute Goals and Plan of Care (Insert Text) Description Occupational Therapy Goals Initiated 5/13/2019 1. Patient will perform grooming with modified independence standing at the sink within 7 day(s). 2.  Patient will perform upper body dressing and lower body dressing with supervision/set-up within 7 day(s). 3.  Patient will perform simple home management with supervision/set-up within 7 day(s). 4.  Patient will perform toilet transfers with modified independence within 7 day(s). 5.  Patient will perform all aspects of toileting with supervision/set-up within 7 day(s). 6.  Patient will participate in upper extremity therapeutic exercise/activities with supervision/set-up for 10 minutes in standing with < or = 2 rest breaks and supervision within 7 day(s). Outcome: Progressing Towards Goal 
  
OCCUPATIONAL THERAPY TREATMENT Patient: Shantelle Ramirez (80 y.o. female) Date: 5/16/2019 Diagnosis: Hypoglycemia [E16.2] Hypernatremia [E87.0] Hypoglycemia Precautions: Fall Chart, occupational therapy assessment, plan of care, and goals were reviewed. ASSESSMENT: Patient seen by OT for treatment. Currently has [employer] in place. Participated in BUE exercise and also did a few ankle pumps. Reports of pain were intermittent and stated she felt better after bed exercise. Anticipate will transition to SNF at discharge. Progression toward goals: 
?       Improving appropriately and progressing toward goals ? Improving slowly and progressing toward goals ? Not making progress toward goals and plan of care will be adjusted PLAN: 
Patient continues to benefit from skilled intervention to address the above impairments. Continue treatment per established plan of care. Discharge Recommendations:  Dylan See Further Equipment Recommendations for Discharge:  none SUBJECTIVE:  
 Patient stated ? I feel like I have pain everywhere. ? OBJECTIVE DATA SUMMARY:  
Cognitive/Behavioral Status: 
Neurologic State: Alert Orientation Level: Oriented to person;Oriented to place; Disoriented to situation;Oriented to time; Other (Comment)(hallucinations) Cognition: Follows commands;Memory loss; Impulsive Therapeutic Exercises:  
10 reps shoulder flexion, elbow flexion abd/add Pain: 
Pain Scale 1: Numeric (0 - 10) Pain Intensity 1: 0 Activity Tolerance:  
fair Please refer to the flowsheet for vital signs taken during this treatment. After treatment:  
? Patient left in no apparent distress sitting up in chair ? Patient left in no apparent distress in bed 
? Call bell left within reach ? Nursing notified ? Caregiver present ? Bed alarm activated COMMUNICATION/COLLABORATION:  
The patient?s plan of care was discussed with: Registered Nurse Ct Vazquez Time Calculation: 14 mins

## 2019-05-16 NOTE — PROGRESS NOTES
Full note to follow. Army Abram, PT Vitals:  
   05/16/19 1556 05/16/19 1603 BP:   157/75 166/86 BP 1 Location:   Left arm Left arm BP Patient Position:   Supine Standing Pulse:   87 86 Resp:      
Temp:      
SpO2: on room air   100% 100%

## 2019-05-16 NOTE — PROGRESS NOTES
Spoke with attending and pt still requiring inpt care, possibly sepsis and will remain hospitalized over w/e. Pt is accepted at [facility], CM will update.  
 
SERAFIN Johnson

## 2019-05-16 NOTE — PROGRESS NOTES
Hospitalist Progress Note Tom Estrada MD 
Answering service: [phone] OR [phone] from in house phone Cell: [phone] Date of Service:  2019 NAME:  Amisha Pablo :  10/11/1932 MRN:  [id number] Admission Summary:  
 
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 The patient is a very poor historian, not much history could be obtained from her, although she is alert, oriented x3, but does not provide much history, reports that she has been having some decreased appetite and has not been feeling well. The patient reports that she has not taken a bath, has not been taking good care of herself since the time she was discharged from the hospital in 10/2018. The patient reports that she has had some chills, decreased appetite and has not been sleeping well. The patient reports that today \"she felt weaker than usual,\" got concerned and decided to come to the hospital 
 
Interval history / Subjective:  
  
 
2019 : 
 
BS;s better but became hypothermic and elevated lactic acid last pm. Now on bear hugger warmer ; for cont monitoring and empiric sepsis w/u rx ,  Pt cherie bolus ivf/ abx; blood cult cxr pending. Cont on abx for now. Assessment & Plan: Hypoglycemia due to poor oral intake 
-finger stick glucose dropped to 54 last night, on  D10 W at 50 ml/hr 
-encourage po intake 
-continue monitor finger stick glucose 
- off iv dextrose today Hypernatremia due to dehydration due to poor oral intake 
-improved with IVF to D10W at 50 ml hr, Na 137 
-repeat bmp in am resolved f/u lab for 5/15 am  
 
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    F/u TSH however is reasonable as started low intro dose 5/16/2019 Sepsis/sirs; under eval as of early am 5/16. Hypothermia unclear etiology = likely 2n2 pan hypo pit status  
-afebrile, no leukocytosis  
-chest x ray no acute process 
-temp was 94.8 5/12 
-resolved, off niki hugger  
  
Hx of HTN 
-BP normal, add norvasc,monitor BP 
BP Readings from Last 1 Encounters:  
05/16/19 130/62  
  
  
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 
-outpatient follow up at [address] [place] [address] with neurosurgeon  
  
Chronic severe eczema  Could be assoc with vit deficiency/mal nurishement.  
-continue protective skin cream 
  
Debility 
-PT/OT Hx of recurrent fall  
-PT/OT 
-Fall precaution Severe protein calorie malnutrition  
-soft diet with nutritional supplement Lab Results Component Value Date/Time Protein, total 6.4 05/16/2019 04:42 AM  
 Albumin 2.1 (L) 05/16/2019 04:42 AM  
 pt is effectively edentulous, she does like supplements. ; nutri consult and for vit eval /replacement empirically.  
  
Full Code; at risk for decompensation, palliative care team consulted 
  
Code status: Full Code DVT prophylaxis: heparin Care Plan discussed with: Patient/Family, Nurse and  Disposition: SNF/Rehab Hospital Problems  Date Reviewed: 5/11/2019 Codes Class Noted POA Panhypopituitarism ([facility] [address] ICD-10-CM: E23.0 ICD-9-CM: 253.2  5/15/2019 Unknown Suprasellar mass ICD-10-CM: R22.0 ICD-9-CM: 784.2  5/15/2019 Unknown Vital Signs:  
 Last 24hrs VS reviewed since prior progress note. Most recent are: 
Visit Vitals /62 (BP 1 Location: Right arm, BP Patient Position: At rest;Supine) Pulse 74 Temp 96.6 °F (35.9 °C) Resp 12 Wt 47.3 kg (104 lb 4.4 oz) SpO2 100% BMI 21.06 kg/m² Intake/Output Summary (Last 24 hours) at 5/16/2019 1036 Last data filed at 5/15/2019 4127 Gross per 24 hour Intake 200 ml Output  Net 200 ml Physical Examination: Stable exam  
Constitutional:  No acute distress, cooperative, pleasant   
ENT:  Oral mucous moist, oropharynx benign. Neck supple, Resp:  CTA bilaterally. No wheezing/rhonchi/rales. No accessory muscle use CV:  Regular rhythm, normal rate, no murmurs, gallops, rubs GI:  Soft, non distended, non tender. normoactive bowel sounds, no hepatosplenomegaly Musculoskeletal:  No edema, Neurologic:  Moves all extremities. AAOx3, CN II-XII reviewed Skin:  diffuse chronic eczema all over Data Review:  
 Review and/or order of clinical lab test 
 
 
Labs:  
 
Recent Labs 05/16/19 
2896 05/15/19 
6122 WBC 7.0 4.7 HGB 11.2* 9.9*  
HCT 35.5 30.6* * 159 Recent Labs 05/16/19 
8751 05/15/19 
0645 05/13/19 
1117  141 137  
K 3.9 3.8 4.0  
* 107 103 CO2 28 29 28 BUN 10 8 7 CREA 0.67 0.56 0.64 GLU 73 42* 143* CA 8.6 7.8* 8.0*  
MG 2.1  --   --   
PHOS 3.2  --   --   
 
Recent Labs 05/16/19 
6480 SGOT 93* ALT 50 * TBILI 0.6 TP 6.4 ALB 2.1*  
GLOB 4.3* No results for input(s): INR, PTP, APTT in the last 72 hours. No lab exists for component: INREXT, INREXT No results for input(s): FE, TIBC, PSAT, FERR in the last 72 hours. No results found for: FOL, RBCF No results for input(s): PH, PCO2, PO2 in the last 72 hours. No results for input(s): CPK, CKNDX, TROIQ in the last 72 hours. No lab exists for component: CPKMB No results found for: CHOL, CHOLX, CHLST, CHOLV, HDL, LDL, LDLC, DLDLP, TGLX, TRIGL, TRIGP, CHHD, CHHDX Lab Results Component Value Date/Time Glucose (POC) 89 05/16/2019 07:18 AM  
 Glucose (POC) 74 05/16/2019 06:57 AM  
 Glucose (POC) 92 05/16/2019 03:07 AM  
 Glucose (POC) 158 (H) 05/16/2019 12:41 AM  
 Glucose (POC) 102 (H) 05/15/2019 03:32 PM  
 
Lab Results Component Value Date/Time  Color YELLOW/STRAW 05/16/2019 06:17 AM  
 Appearance CLOUDY (A) 05/16/2019 06:17 AM  
 Specific gravity 1.013 05/16/2019 06:17 AM  
 pH (UA) 7.0 05/16/2019 06:17 AM  
 Protein NEGATIVE  05/16/2019 06:17 AM  
 Glucose NEGATIVE  05/16/2019 06:17 AM  
 Ketone NEGATIVE  05/16/2019 06:17 AM  
 Bilirubin NEGATIVE  05/16/2019 06:17 AM  
 Urobilinogen 1.0 05/16/2019 06:17 AM  
 Nitrites NEGATIVE  05/16/2019 06:17 AM  
 Leukocyte Esterase NEGATIVE  05/16/2019 06:17 AM  
 Epithelial cells FEW 05/16/2019 06:17 AM  
 Bacteria 2+ (A) 05/16/2019 06:17 AM  
 WBC 0-4 05/16/2019 06:17 AM  
 RBC 0-5 05/16/2019 06:17 AM  
 
 
 
Medications Reviewed:  
 
Current Facility-Administered Medications Medication Dose Route Frequency  levothyroxine (SYNTHROID) tablet 50 mcg  50 mcg Oral ACB  
 0.9% sodium chloride infusion  50 mL/hr IntraVENous CONTINUOUS  
 methylPREDNISolone (PF) (SOLU-MEDROL) injection 40 mg  40 mg IntraVENous Q8H  
 cefepime (MAXIPIME) 2 g in 0.9% sodium chloride (MBP/ADV) 100 mL  2 g IntraVENous Q12H  
 sodium chloride 0.9 % bolus infusion 419 mL  419 mL IntraVENous ONCE  
 vancomycin (VANCOCIN) 1,000 mg in 0.9% sodium chloride (MBP/ADV) 250 mL  1,000 mg IntraVENous ONCE  Vancomycin- Pharmacy to Dose   Other Rx Dosing/Monitoring  folic acid (FOLVITE) tablet 1 mg  1 mg Oral DAILY  thiamine HCL (B-1) tablet 100 mg  100 mg Oral DAILY  ascorbic acid (vitamin C) (VITAMIN C) tablet 500 mg  500 mg Oral DAILY  zinc sulfate (10 mg/mL elemental zinc) oral suspension (44 mg/mL zinc sulfate)  0.5 mg/kg/day Oral DAILY  multivitamin with iron (FLINTSTONES) chewable tablet 1 Tab  1 Tab Oral DAILY  pantothenic ac-min oil-pet,hyd (AQUAPHOR) 41 % ointment   Topical BID  
 glucose chewable tablet 16 g  4 Tab Oral PRN  
 dextrose (D50W) injection syrg 12.5-25 g  12.5-25 g IntraVENous PRN  
 glucagon (GLUCAGEN) injection 1 mg  1 mg IntraMUSCular PRN  
 sodium chloride (NS) flush 5-40 mL  5-40 mL IntraVENous Q8H  
 sodium chloride (NS) flush 5-40 mL  5-40 mL IntraVENous PRN  
  acetaminophen (TYLENOL) tablet 650 mg  650 mg Oral Q4H PRN  
 heparin (porcine) injection 5,000 Units  5,000 Units SubCUTAneous Q8H  
 
______________________________________________________________________ EXPECTED LENGTH OF STAY: 3d 7h 
ACTUAL LENGTH OF STAY:          5 Idalmis Cartagena MD

## 2019-05-16 NOTE — PROGRESS NOTES
1930: Bedside shift change report given to TRISTAN Lopez (oncoming nurse) by [facility], RN (offgoing nurse). Report included the following information SBAR, Kardex, Intake/Output, MAR, Recent Results and Cardiac Rhythm NSR. Problem: Falls - Risk of 
Goal: *Absence of Falls Description Document Shivam Buckley Fall Risk and appropriate interventions in the flowsheet. Outcome: Progressing Towards Goal 
Note:  
Fall Risk Interventions: 
Mobility Interventions: Bed/chair exit alarm, Communicate number of staff needed for ambulation/transfer, OT consult for ADLs, Patient to call before getting OOB, PT Consult for mobility concerns, PT Consult for assist device competence, Strengthening exercises (ROM-active/passive), Utilize walker, cane, or other assistive device Mentation Interventions: Adequate sleep, hydration, pain control, Door open when patient unattended, Bed/chair exit alarm, Evaluate medications/consider consulting pharmacy, Eyeglasses and hearing aids, Familiar objects from home, Increase mobility, More frequent rounding, Room close to nurse's station, Reorient patient, Toileting rounds, Update white board Medication Interventions: Assess postural VS orthostatic hypotension, Evaluate medications/consider consulting pharmacy, Patient to call before getting OOB, Teach patient to arise slowly, Bed/chair exit alarm Elimination Interventions: Call light in reach, Elevated toilet seat, Bed/chair exit alarm, Patient to call for help with toileting needs, Stay With Me (per policy), Toilet paper/wipes in reach, Toileting schedule/hourly rounds History of Falls Interventions: Bed/chair exit alarm, Consult care management for discharge planning, Door open when patient unattended, Investigate reason for fall, Evaluate medications/consider consulting pharmacy, Room close to nurse's station, Utilize gait belt for transfer/ambulation Problem: Pressure Injury - Risk of 
Goal: *Prevention of pressure injury Description Document Bon Scale and appropriate interventions in the flowsheet. Outcome: Progressing Towards Goal 
Note:  
Pressure Injury Interventions: 
Sensory Interventions: Assess changes in LOC, Assess need for specialty bed, Check visual cues for pain, Float heels, Discuss PT/OT consult with provider, Keep linens dry and wrinkle-free, Minimize linen layers, Maintain/enhance activity level, Monitor skin under medical devices, Pressure redistribution bed/mattress (bed type), Turn and reposition approx. every two hours (pillows and wedges if needed) Moisture Interventions: Absorbent underpads, Apply protective barrier, creams and emollients, Assess need for specialty bed, Check for incontinence Q2 hours and as needed, Limit adult briefs, Maintain skin hydration (lotion/cream), Minimize layers, Moisture barrier Activity Interventions: Assess need for specialty bed, Increase time out of bed, Pressure redistribution bed/mattress(bed type), PT/OT evaluation Mobility Interventions: Assess need for specialty bed, Float heels, HOB 30 degrees or less, Pressure redistribution bed/mattress (bed type), PT/OT evaluation, Turn and reposition approx. every two hours(pillow and wedges) Nutrition Interventions: Document food/fluid/supplement intake, Discuss nutritional consult with provider, Offer support with meals,snacks and hydration Friction and Shear Interventions: Apply protective barrier, creams and emollients, Feet elevated on foot rest, Foam dressings/transparent film/skin sealants, HOB 30 degrees or less, Lift sheet, Lift team/patient mobility team, Minimize layers, Transferring/repositioning devices Problem: Body Temperature -  Risk of, Imbalanced Goal: *Absence of cold stress or hypothermia signs and symptoms Outcome: Progressing Towards Goal 
Note:  
Pt off of niki hugger. Monitoring temperature. Monitoring labs. BC sent. ABG obtained this morning.

## 2019-05-16 NOTE — PROGRESS NOTES
Pharmacist Note - Vancomycin Dosing Consult provided for this 80 y.o. female for indication of sepsis of unknown origin. Antibiotic regimen(s): vancomycin + cefepime Recent Labs 19 
0595 19 
4277 05/15/19 
9842 WBC  --  7.0 4.7 CREA 0.67  --  0.56 BUN 10  --  8 Frequency of BMP: daily through  Height: Pending Weight: 47.3 kg Est CrCl: 45 ml/min; UO: x3 unmeasured outputs Temp (24hrs), Av.8 °F (35.4 °C), Min:92.2 °F (33.4 °C), Max:97.9 °F (36.6 °C) Cultures: 
 urine, pending  blood, pending Goal trough = 15 - 20 mcg/mL Therapy will be initiated with a loading dose of 1000 mg IV x 1 to be followed by a maintenance dose of 750 mg IV every 18 hours per [facility] vancomycin dosing protocol. Pharmacy to follow patient daily and order levels / make dose adjustments as appropriate.

## 2019-05-16 NOTE — PROGRESS NOTES
Day #1 of cefepime Indication:  sepsis of unknown origin Current regimen:  1 gram Q8H Abx regimen: cefepime + vancomycin Recent Labs 19 
9571 19 
6494 05/15/19 
0645 19 
1117 WBC  --  7.0 4.7 5.7 CREA 0.67  --  0.56 0.64 BUN 10  --  8 7 Est CrCl: ~40-50 ml/min; UO: x3 unmeasured outputs Temp (24hrs), Av.4 °F (35.2 °C), Min:92.2 °F (33.4 °C), Max:98 °F (36.7 °C) Cultures:  
 blood, pending Plan: Change to cefepime 2 gram Q12H for crcl 30-60 ml/min

## 2019-05-16 NOTE — PROGRESS NOTES
Spiritual Care Partner Volunteer visited patient in room 414 on 5.16.19. Documented by: : Rev. Miladis Moreno. Cedric Orosco; [facility], to contact [phone] [address] call: [phone]

## 2019-05-16 NOTE — PROGRESS NOTES
NUTRITION Consult received. Chart reviewed, discussed with RN. Full assessment completed 5/13. Attempted to see pt x 4 (on the phone x 2, working with PT, and then son called at the beginning of 4th visit, so RD deferred). Will return to evaluate further tomorrow. The pt's intake has been % of meals. Eczema noted on visit. MVI, vitamin C, thiamine, folic acid and zinc ordered by MD to start today. Will follow for additional recommendations as appropriate. Patient Vitals for the past 100 hrs: 
 % Diet Eaten 05/16/19 0800 50 % 05/16/19 0703 120 % 05/15/19 1619 90 % 05/15/19 1018 100 % 05/15/19 0823 90 % 05/12/19 1754 50 % 05/12/19 1340 50 % Last 3 Recorded Weights in this Encounter 05/14/19 2232 05/16/19 0147 05/16/19 4555 Weight: 47.6 kg (104 lb 15 oz) 45.7 kg (100 lb 12.8 oz) 47.3 kg (104 lb 4.4 oz) Patient meets criteria for Severe Protein Calorie Malnutrition as evidenced by:  
ASPEN Malnutrition Criteria Acute Illness, Chronic Illness, or Social/Enviornmental: Chronic illness Energy Intake: Less than/equal to 75% of est energy req for greater than/equal to 1 month Weight Loss: Greater than 10% x 6 mos Body Fat: Severe Muscle Mass: Severe ASPEN Malnutrition Score - Chronic Illness: 24 Chronic Illness - Malnutrition Diagnosis: Severe malnutrition. Estimated Nutrition Needs:  
Kcals/day: 1400 Kcals/day(9019-8598 kcal/day ) Protein: 56 g Fluid: (1 ml/kcal) Based On: Kcal/kg - specify (Comment) Weight Used: Actual wt RD to follow. [address]

## 2019-05-16 NOTE — WOUND CARE
Wound Consult:  Re-Consult Visit. Chart reviewed. Consulted for bilateral hips/sacrum [id number] Spoke with patients nurse,  Bashir Nava. Patient is resting on a Versacare bed with accumax mattress. She is alert and moves well in bed from side to side; bridges up her hips.  at bedside and close family friend. Assessment: 
Sacrum - intact, no redness or dark discoloration. Bilateral buttocks - resurfaced tiny areas on pink intact skin on each buttock - previously open when seen on 5/13. Right lateral upper thigh - linear excoriations from scratching; patient with eczema since childhood. No discoloration/breakdown related to pressure noted on either hip. Abdominal skin fold - intact, some plaques noted. Bilateral heels - red, blanching to slow to milton, tender. Treatment: 
Mepilex border in place to both buttocks - left in place - would continue. Aquaphor to moisturize skin due to eczema. Off load heels with Prevalon boots - Venelex to heels BID. Skin Care Recommendations: 1. Minimize friction/shear: minimize layers of linen/pads under patient. 2. Off load pressure/reposition: continue to turn and reposition approximately every 2 hours; float heels and/or use Prevalon boots; waffle cushion for sitting. 3. Manage Moisture - keep skin folds dry; incontinence skin care as needed; appropriate sized briefs if needed. 4. Continue to monitor nutrition, pain, and skin risk scale, and skin assessment. Plan: 
Call out to Dr. Ned Chaudhry to update him and update orders. We will continue to reassess routinely and as needed. Rangel Wang RN,[facility] Wound Healing Office [phone] Pager [phone]

## 2019-05-16 NOTE — PROGRESS NOTES
1945: Night shift RN entered room with day shift RN Efrain Morocho, he told me that patient had refused to let him check her sugar at 1930, and that she was a little confused. When we entered room and tried to talk to her, I immediately knew that something was different with her mental status. Over the next hour, patient became even more confused, saying things like, \"take me back to my room, I want to go back to my hospital room, this isn't my room\". Patient was sitting on edge of bed, and refused to lay back down or allow us to put a bed alarm in place. 2025: I was on my way to different room, when I over the the monitor tech answering the call light, and someone said Graham bojorquez is in my room\". I immediately ran to the other room and observed Mrs. Dilip Aviles all the way in another patients room, and she refused to leave multiple times. David walters was called, NP hospitalist, security, supervisor and forensic nurse on floor. NP Chuck Pearson was trying to talk to patient to get her back in her room and was succucessful after about 15-20 minutes of conversation. During this time, I had another staff member put a bed alarm on patients bed. 2045: Sitter in place, along with bed alarm. Patient is standing in the corner of her room, talking with sitter, doesn't want to sit or lay down. Still refusing vitals, assessment and refusing to allow us to check her blood sugar. I tried to explain the importance of letting us check it. Her sugar was down to 39 this morning, and although day RN said that patient ate more today, we still want to check q4h. 
 
2300: patient is laying in bed, sitter at bedside. Thong Shook brought to my attention that patient is hallucinating, I also observed this for myself. Paged on call NP to make him aware. Patient also removed her tele wires, and refuses to put it back on. Will ctm 0019: patient back out in the halls, trying to go into another patients room, she was grabbing the door handles, I was trying to get her hands off the door, and she was becoming aggressive trying to move my hands. David Stovall called. NP was already on the phone prior to this, by the time security arrived on phone, she was no longer aggressive but still wondering the halls. We eventually got her back into her room. 0041: patient agreed to allow NP Sukhdeep Rich to check her sugar, it was 158.  
 
0330: patient has been resting in bed, she has agreed to let us get her vitals and blood sugar again. Unable to obtain a temperature (when this happened before, she had to be put on niki hugger). Oral temp 92.2, ax 93.0, rectal 94.8. NP paged, orders in. Awaiting transfer bed.

## 2019-05-16 NOTE — PROGRESS NOTES
Problem: Mobility Impaired (Adult and Pediatric) Goal: *Acute Goals and Plan of Care (Insert Text) Description Physical Therapy Goals 5/15/2019 1. Patient will move from supine to sit and sit to supine , scoot up and down and roll side to side in bed with supervision/set-up within 7 day(s). 2.  Patient will transfer from bed to chair and chair to bed with supervision/set-up using the least restrictive device within 7 day(s). 3.  Patient will perform sit to stand with supervision/set-up within 7 day(s). 4.  Patient will ambulate with supervision/set-up for 200 feet with the least restrictive device within 7 day(s). Outcome: Not Progressing Towards Goal 
 
PHYSICAL THERAPY TREATMENT Patient: Amisha Pablo (80 y.o. female) Date: 5/16/2019 Diagnosis: Hypoglycemia [E16.2] Hypernatremia [E87.0] Hypoglycemia Precautions: Fall, bed alarm Chart, physical therapy assessment, plan of care and goals were reviewed. ASSESSMENT: 
Pt received in supine agreeable to PT with some coaxing. Pt was transferred to [facility] from [address] [place] [address] because of need for [employer], was hypothermic. Vital signs were monitored and were stable. Pt was overall at a min assist to stand by assist level. Note tendency for retropulsion. Pt reported feeling dizzy despite stable BP. She did not amb far and was returned to bed. Anticipate need for rehab at SNF level. Vitals:  
      05/16/19 1556 05/16/19 1603 BP:     157/75 166/86 BP 1 Location:     Left arm Left arm BP Patient Position:     Supine Standing Pulse:     87 86 Resp:          
Temp:          
SpO2: on room air     100% 100% Progression toward goals: 
?    Improving appropriately and progressing toward goals ? Improving slowly and progressing toward goals ? Not making progress toward goals and plan of care will be adjusted PLAN: 
Patient continues to benefit from skilled intervention to address the above impairments. Continue treatment per established plan of care. Discharge Recommendations:  Dylan Cui Further Equipment Recommendations for Discharge:  none SUBJECTIVE:  
Patient stated ? I'm afraid I'm going to fall. .? OBJECTIVE DATA SUMMARY:  
Chart checked, pt cleared by nursing course noted Critical Behavior: 
Neurologic State: Alert Orientation Level: Oriented to person, Oriented to place, Disoriented to situation, Oriented to time, Other (Comment)(hallucinations) Cognition: Follows commands, Memory loss, Impulsive Safety/Judgement: Fall prevention, Awareness of environment Functional Mobility Training: 
Bed Mobility: 
  
Supine to Sit: Stand-by assistance Sit to Supine: Minimum assistance Transfers: 
Sit to Stand: Minimum assistance Stand to Sit: Contact guard assistance Balance: 
Sitting: Intact; Without support Standing: Impaired; With support Standing - Static: Constant support; Fair 
Standing - Dynamic : Fair Ambulation/Gait Training: 
Distance (ft): 3 Feet (ft) Assistive Device: Gait belt(pt holding onto my arms) Ambulation - Level of Assistance: Minimal assistance Gait Abnormalities: Decreased step clearance Base of Support: Narrowed Speed/Malorie: Slow Step Length: Left shortened;Right shortened Stairs: 
  
  
   
 
Neuro Re-Education: 
Therapeutic Exercises:  
 
Pain: 
Pain Scale 1: Numeric (0 - 10) Pain Intensity 1: 0 Activity Tolerance:  
See assessment above Please refer to the flowsheet for vital signs taken during this treatment. After treatment:  
?    Patient left in no apparent distress sitting up in chair ? Patient left in no apparent distress in bed 
? Call bell left within reach ? Nursing notified ? Caregiver present ? Bed alarm activated COMMUNICATION/COLLABORATION:  
The patient?s plan of care was discussed with: Registered Nurse Oral Colton Time Calculation: 17 mins

## 2019-05-16 NOTE — PROGRESS NOTES
Odin NP Progress note Name: Doyle Herrera YOB: 1932 MRN: [id number] Admission Date: 5/10/2019  4:04 PM 
 
Date of service: 5/16/2019 3:26 AM 
 
Overnight Update:  
  
 
Complaint: Agitation, altered mental status, hallucinations Paged by: Laury Adams, primary nurse Subjective: Patient with two code atlas called on her due to wandering into other patient's rooms and refusing to return to her room. Discussed with primary RN, apparently patient is at baseline alert and oriented x 3, now hallucinating that there is water on the walls and spider webs on her sheets. Spent some time with patient, she appears to be oriented to self and somewhat to the situation, going back and forth between being in the hospital or a school. Does not know the current location and did not answer regarding the year. Quite anxious regarding staff, though she just had two code atlases called, the increased stimulation may be worsening this. Patient gave permission to speak to her niece, Mikayla De Paz. Spoke to her on the phone, updated her regarding patient's current mental status. She lives locally, but is unable to come to the hospital due to transportation issues. Plan:  
- Etiology of confusion unclear, will check stat labs, head CT when patient is able to tolerate 
- Had been hypoglycemic earlier in visit, 158 at present - PCT sitting with patient for safety Update 0330 - Notified by primary RN Anel Chambers that patient's temperature is 92.2F. Per chart review, she had been hypothermic earlier in the hospital stay, cause unknown. Will place her on Imelda Hugger and monitor, will require transfer to higher level of care for rewarming. Discussed with primary RN and nursing supervisor Serg Guillen. CBC looks ok, awaiting the rest of her labs. Update 0500 - Patient now more calm, resting in bed, no longer wandering about unit. Head CT ordered.   
 
Dm QUIROGA-C, FAIZA 
 [phone] or [facility]

## 2019-05-16 NOTE — PROGRESS NOTES
TRANSFER - IN REPORT: 
 
Verbal report received from Ralph(name) on Urvashi Urbina  being received from 3N(unit) for change in patient condition(Hypothermia) Report consisted of patients Situation, Background, Assessment and  
Recommendations(SBAR). Information from the following report(s) SBAR, Kardex, Procedure Summary, Recent Results and Cardiac Rhythm SB was reviewed with the receiving nurse. Opportunity for questions and clarification was provided. Skin assessment completed upon patients arrival to unit and care assumed.

## 2019-05-16 NOTE — PROGRESS NOTES
Bedside and Verbal shift change report given to Freda Johnson (oncoming nurse) by Brinda Jones (offgoing nurse). Report included the following information SBAR, MAR and Recent Results.

## 2019-05-17 LAB
ANION GAP SERPL CALC-SCNC: 7 MMOL/L (ref 5–15)
BACTERIA SPEC CULT: NORMAL
BUN SERPL-MCNC: 8 MG/DL (ref 6–20)
BUN/CREAT SERPL: 14 (ref 12–20)
CALCIUM SERPL-MCNC: 8.1 MG/DL (ref 8.5–10.1)
CC UR VC: NORMAL
CHLORIDE SERPL-SCNC: 113 MMOL/L (ref 97–108)
CO2 SERPL-SCNC: 27 MMOL/L (ref 21–32)
CREAT SERPL-MCNC: 0.56 MG/DL (ref 0.55–1.02)
FOLATE SERPL-MCNC: 16.8 NG/ML (ref 5–21)
GLUCOSE BLD STRIP.AUTO-MCNC: 106 MG/DL (ref 65–100)
GLUCOSE BLD STRIP.AUTO-MCNC: 112 MG/DL (ref 65–100)
GLUCOSE BLD STRIP.AUTO-MCNC: 115 MG/DL (ref 65–100)
GLUCOSE BLD STRIP.AUTO-MCNC: 117 MG/DL (ref 65–100)
GLUCOSE BLD STRIP.AUTO-MCNC: 82 MG/DL (ref 65–100)
GLUCOSE BLD STRIP.AUTO-MCNC: 87 MG/DL (ref 65–100)
GLUCOSE SERPL-MCNC: 100 MG/DL (ref 65–100)
POTASSIUM SERPL-SCNC: 3.4 MMOL/L (ref 3.5–5.1)
SERVICE CMNT-IMP: ABNORMAL
SERVICE CMNT-IMP: NORMAL
SODIUM SERPL-SCNC: 147 MMOL/L (ref 136–145)
VIT B12 SERPL-MCNC: >2000 PG/ML (ref 193–986)

## 2019-05-17 PROCEDURE — 94760 N-INVAS EAR/PLS OXIMETRY 1: CPT

## 2019-05-17 PROCEDURE — 80048 BASIC METABOLIC PNL TOTAL CA: CPT

## 2019-05-17 PROCEDURE — 74011250637 HC RX REV CODE- 250/637: Performed by: INTERNAL MEDICINE

## 2019-05-17 PROCEDURE — 74011250636 HC RX REV CODE- 250/636: Performed by: FAMILY MEDICINE

## 2019-05-17 PROCEDURE — 74011250636 HC RX REV CODE- 250/636: Performed by: INTERNAL MEDICINE

## 2019-05-17 PROCEDURE — 74011250636 HC RX REV CODE- 250/636: Performed by: NURSE PRACTITIONER

## 2019-05-17 PROCEDURE — 82607 VITAMIN B-12: CPT

## 2019-05-17 PROCEDURE — 77030011256 HC DRSG MEPILEX <16IN NO BORD MOLN -A

## 2019-05-17 PROCEDURE — 65270000029 HC RM PRIVATE

## 2019-05-17 PROCEDURE — 82962 GLUCOSE BLOOD TEST: CPT

## 2019-05-17 PROCEDURE — 82746 ASSAY OF FOLIC ACID SERUM: CPT

## 2019-05-17 PROCEDURE — 36415 COLL VENOUS BLD VENIPUNCTURE: CPT

## 2019-05-17 PROCEDURE — 74011000258 HC RX REV CODE- 258: Performed by: INTERNAL MEDICINE

## 2019-05-17 PROCEDURE — 84630 ASSAY OF ZINC: CPT

## 2019-05-17 RX ORDER — NYSTATIN 100000 [USP'U]/G
POWDER TOPICAL 2 TIMES DAILY
Status: DISCONTINUED | OUTPATIENT
Start: 2019-05-17 | End: 2019-05-22 | Stop reason: HOSPADM

## 2019-05-17 RX ORDER — BALSAM PERU/CASTOR OIL
OINTMENT (GRAM) TOPICAL 2 TIMES DAILY
Status: DISCONTINUED | OUTPATIENT
Start: 2019-05-17 | End: 2019-05-22 | Stop reason: HOSPADM

## 2019-05-17 RX ADMIN — VANCOMYCIN HYDROCHLORIDE 750 MG: 750 INJECTION, POWDER, LYOPHILIZED, FOR SOLUTION INTRAVENOUS at 05:58

## 2019-05-17 RX ADMIN — Medication 100 MG: at 09:53

## 2019-05-17 RX ADMIN — OXYCODONE HYDROCHLORIDE AND ACETAMINOPHEN 500 MG: 500 TABLET ORAL at 09:53

## 2019-05-17 RX ADMIN — WHITE PETROLATUM: 1.75 OINTMENT TOPICAL at 17:53

## 2019-05-17 RX ADMIN — LEVOTHYROXINE SODIUM 50 MCG: 50 TABLET ORAL at 10:14

## 2019-05-17 RX ADMIN — HYDROCORTISONE 10 MG: 10 TABLET ORAL at 16:48

## 2019-05-17 RX ADMIN — Medication 23.7 MG: at 09:52

## 2019-05-17 RX ADMIN — Medication 1 TABLET: at 09:53

## 2019-05-17 RX ADMIN — HEPARIN SODIUM 5000 UNITS: 5000 INJECTION INTRAVENOUS; SUBCUTANEOUS at 05:55

## 2019-05-17 RX ADMIN — CEFEPIME 2 G: 2 INJECTION, POWDER, FOR SOLUTION INTRAVENOUS at 20:40

## 2019-05-17 RX ADMIN — WHITE PETROLATUM: 1.75 OINTMENT TOPICAL at 10:11

## 2019-05-17 RX ADMIN — CASTOR OIL AND BALSAM, PERU: 788; 87 OINTMENT TOPICAL at 17:52

## 2019-05-17 RX ADMIN — VANCOMYCIN HYDROCHLORIDE 750 MG: 750 INJECTION, POWDER, LYOPHILIZED, FOR SOLUTION INTRAVENOUS at 22:16

## 2019-05-17 RX ADMIN — Medication 10 ML: at 06:04

## 2019-05-17 RX ADMIN — HEPARIN SODIUM 5000 UNITS: 5000 INJECTION INTRAVENOUS; SUBCUTANEOUS at 09:52

## 2019-05-17 RX ADMIN — HEPARIN SODIUM 5000 UNITS: 5000 INJECTION INTRAVENOUS; SUBCUTANEOUS at 16:48

## 2019-05-17 RX ADMIN — CEFEPIME 2 G: 2 INJECTION, POWDER, FOR SOLUTION INTRAVENOUS at 09:51

## 2019-05-17 RX ADMIN — SODIUM CHLORIDE 50 ML/HR: 900 INJECTION, SOLUTION INTRAVENOUS at 06:06

## 2019-05-17 RX ADMIN — FOLIC ACID 1 MG: 1 TABLET ORAL at 09:53

## 2019-05-17 RX ADMIN — HYDROCORTISONE 20 MG: 10 TABLET ORAL at 10:13

## 2019-05-17 RX ADMIN — CASTOR OIL AND BALSAM, PERU: 788; 87 OINTMENT TOPICAL at 09:53

## 2019-05-17 RX ADMIN — Medication 10 ML: at 13:14

## 2019-05-17 RX ADMIN — NYSTATIN: 100000 POWDER TOPICAL at 09:52

## 2019-05-17 RX ADMIN — NYSTATIN: 100000 POWDER TOPICAL at 17:53

## 2019-05-17 NOTE — PROGRESS NOTES
Hospitalist Progress Note Billy Rosario MD 
Answering service: [phone] OR [phone] from in house phone Cell: [phone] Date of Service:  2019 NAME:  Billy Bradley :  10/11/1932 MRN:  [id number] Admission Summary:  
 
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 The patient is a very poor historian, not much history could be obtained from her, although she is alert, oriented x3, but does not provide much history, reports that she has been having some decreased appetite and has not been feeling well. The patient reports that she has not taken a bath, has not been taking good care of herself since the time she was discharged from the hospital in 10/2018. The patient reports that she has had some chills, decreased appetite and has not been sleeping well. The patient reports that today \"she felt weaker than usual,\" got concerned and decided to come to the hospital 
 
Interval history / Subjective:  
  
 
2019 : 
 
No issues over night,  Bs/bp/temp all stable ; cont current rx. De- escalate abx over weekend Assessment & Plan: Hypoglycemia due to poor oral intake 
-finger stick glucose dropped to 54 last night, on  D10 W at 50 ml/hr 
-encourage po intake 
-continue monitor finger stick glucose 
- off iv dextrose today Resolving with endo replacement Hypernatremia due to dehydration due to poor oral intake 
-improved with IVF to D10W at 50 ml hr, Na 137 
-repeat bmp in am resolved f/u lab for 5/15 am  
 
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    F/u TSH however is reasonable as started low intro dose 5/15/2019 and advance dose slowly Sepsis/sirs; under eval as of early am 5/16. - seem resolved, as an acute clinical issues, de escalate abx over next couple of days. Hypothermia  likely 2n2 pan hypo pit status  
-afebrile, no leukocytosis  
-chest x ray no acute process 
-temp was 94.8 5/12 
-resolved, off niki hugger  
  
Hx of HTN 
-BP normal, add norvasc,monitor BP 
BP Readings from Last 1 Encounters:  
05/17/19 151/59  
  
  
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 
-outpatient follow up at [facility] with neurosurgeon  
  
Chronic severe eczema  Could be assoc with vit deficiency/mal nurishement.  
-continue protective skin cream 
  
Debility 
-PT/OT Hx of recurrent fall  
-PT/OT 
-Fall precaution Severe protein calorie malnutrition  
-soft diet with nutritional supplement Lab Results Component Value Date/Time Protein, total 6.4 05/16/2019 04:42 AM  
 Albumin 2.1 (L) 05/16/2019 04:42 AM  
 pt is effectively edentulous, she does like supplements. ; nutri consult and for vit eval /replacement empirically.  
  
Full Code; at risk for decompensation, palliative care team consulted 
  
Code status: Full Code DVT prophylaxis: heparin Care Plan discussed with: Patient/Family, Nurse and  Disposition: SNF/Rehab Hospital Problems  Date Reviewed: 5/11/2019 Codes Class Noted POA Panhypopituitarism ([address] ICD-10-CM: E23.0 ICD-9-CM: 253.2  5/15/2019 Unknown Suprasellar mass ICD-10-CM: R22.0 ICD-9-CM: 784.2  5/15/2019 Unknown Ros; No cp no sob no n/v/d/. Is weak but feeling stronger Vital Signs:  
 Last 24hrs VS reviewed since prior progress note. Most recent are: 
Visit Vitals /59 (BP 1 Location: Left arm, BP Patient Position: At rest;Supine) Pulse 85 Temp 97.4 °F (36.3 °C) Resp 18 Wt 49.5 kg (109 lb 3.2 oz) SpO2 100% BMI 22.06 kg/m² Intake/Output Summary (Last 24 hours) at 5/17/2019 1306 Last data filed at 5/17/2019 2021 Gross per 24 hour Intake 1170.83 ml Output 600 ml Net 570.83 ml Physical Examination:  
 
 
    Stable exam  
Constitutional:  No acute distress, cooperative, pleasant   
ENT:  Oral mucous moist, oropharynx benign. Neck supple, Resp:  CTA bilaterally. No wheezing/rhonchi/rales. No accessory muscle use CV:  Regular rhythm, normal rate, no murmurs, gallops, rubs GI:  Soft, non distended, non tender. normoactive bowel sounds, no hepatosplenomegaly Musculoskeletal:  No edema, Neurologic:  Moves all extremities. AAOx3, CN II-XII reviewed Skin:  diffuse chronic eczema all over Data Review:  
 Review and/or order of clinical lab test 
 
 
Labs:  
 
Recent Labs 05/16/19 
6178 05/15/19 
3860 WBC 7.0 4.7 HGB 11.2* 9.9*  
HCT 35.5 30.6* * 159 Recent Labs 05/17/19 
1009 05/16/19 
0442 05/15/19 
0774 * 143 141  
K 3.4* 3.9 3.8 * 109* 107 CO2 27 28 29 BUN 8 10 8 CREA 0.56 0.67 0.56  73 42*  
CA 8.1* 8.6 7.8*  
MG  --  2.1  --   
PHOS  --  3.2  --   
 
Recent Labs 05/16/19 
7338 SGOT 93* ALT 50 * TBILI 0.6 TP 6.4 ALB 2.1*  
GLOB 4.3* No results for input(s): INR, PTP, APTT in the last 72 hours. No lab exists for component: INREXT, INREXT No results for input(s): FE, TIBC, PSAT, FERR in the last 72 hours. Lab Results Component Value Date/Time Folate 16.8 05/17/2019 10:09 AM  
  
No results for input(s): PH, PCO2, PO2 in the last 72 hours. No results for input(s): CPK, CKNDX, TROIQ in the last 72 hours. No lab exists for component: CPKMB No results found for: CHOL, CHOLX, CHLST, CHOLV, HDL, LDL, LDLC, DLDLP, TGLX, TRIGL, TRIGP, CHHD, CHHDX Lab Results Component Value Date/Time  Glucose (POC) 117 (H) 05/17/2019 10:11 AM  
 Glucose (POC) 115 (H) 05/17/2019 05:45 AM  
 Glucose (POC) 82 05/17/2019 03:02 AM  
 Glucose (POC) 128 (H) 05/16/2019 08:54 PM  
 Glucose (POC) 100 05/16/2019 02:55 PM  
 
 Lab Results Component Value Date/Time Color YELLOW/STRAW 05/16/2019 06:17 AM  
 Appearance CLOUDY (A) 05/16/2019 06:17 AM  
 Specific gravity 1.013 05/16/2019 06:17 AM  
 pH (UA) 7.0 05/16/2019 06:17 AM  
 Protein NEGATIVE  05/16/2019 06:17 AM  
 Glucose NEGATIVE  05/16/2019 06:17 AM  
 Ketone NEGATIVE  05/16/2019 06:17 AM  
 Bilirubin NEGATIVE  05/16/2019 06:17 AM  
 Urobilinogen 1.0 05/16/2019 06:17 AM  
 Nitrites NEGATIVE  05/16/2019 06:17 AM  
 Leukocyte Esterase NEGATIVE  05/16/2019 06:17 AM  
 Epithelial cells FEW 05/16/2019 06:17 AM  
 Bacteria 2+ (A) 05/16/2019 06:17 AM  
 WBC 0-4 05/16/2019 06:17 AM  
 RBC 0-5 05/16/2019 06:17 AM  
 
 
 
Medications Reviewed:  
 
Current Facility-Administered Medications Medication Dose Route Frequency  nystatin (MYCOSTATIN) 100,000 unit/gram powder   Topical BID  
 balsam peru-castor oil (VENELEX) ointment   Topical BID  levothyroxine (SYNTHROID) tablet 50 mcg  50 mcg Oral ACB  
 0.9% sodium chloride infusion  50 mL/hr IntraVENous CONTINUOUS  
 cefepime (MAXIPIME) 2 g in 0.9% sodium chloride (MBP/ADV) 100 mL  2 g IntraVENous Q12H  Vancomycin- Pharmacy to Dose   Other Rx Dosing/Monitoring  folic acid (FOLVITE) tablet 1 mg  1 mg Oral DAILY  thiamine HCL (B-1) tablet 100 mg  100 mg Oral DAILY  ascorbic acid (vitamin C) (VITAMIN C) tablet 500 mg  500 mg Oral DAILY  zinc sulfate (10 mg/mL elemental zinc) oral suspension (44 mg/mL zinc sulfate)  0.5 mg/kg/day Oral DAILY  multivitamin with iron (FLINTSTONES) chewable tablet 1 Tab  1 Tab Oral DAILY  hydrocortisone (CORTEF) tablet 20 mg  20 mg Oral ACB  hydrocortisone (CORTEF) tablet 10 mg  10 mg Oral ACD  vancomycin (VANCOCIN) 750 mg in 0.9% sodium chloride (MBP/ADV) 250 mL  750 mg IntraVENous Q18H  pantothenic ac-min oil-pet,hyd (AQUAPHOR) 41 % ointment   Topical BID  
 glucose chewable tablet 16 g  4 Tab Oral PRN  
  dextrose (D50W) injection syrg 12.5-25 g  12.5-25 g IntraVENous PRN  
 glucagon (GLUCAGEN) injection 1 mg  1 mg IntraMUSCular PRN  
 sodium chloride (NS) flush 5-40 mL  5-40 mL IntraVENous Q8H  
 sodium chloride (NS) flush 5-40 mL  5-40 mL IntraVENous PRN  
 acetaminophen (TYLENOL) tablet 650 mg  650 mg Oral Q4H PRN  
 heparin (porcine) injection 5,000 Units  5,000 Units SubCUTAneous Q8H  
 
______________________________________________________________________ EXPECTED LENGTH OF STAY: 3d 7h 
ACTUAL LENGTH OF STAY:          6 Tom Estrada MD

## 2019-05-17 NOTE — PROGRESS NOTES
Hospitalist paged for restraints order d/t pt's non-compliance with nursing interventions (obtaining vital signs, attempting to remove IV and exiting bed). Pt stated, \"I want to get out of here, give me my shoes, I'm home. Why are you keeping me here? \" Attempts to redirect ineffective, pt is a risk to herself, confused and combative.

## 2019-05-17 NOTE — PROGRESS NOTES
1403- blood cultures grow gram positive cocci in 1/4 bottles. Dr. Samuels Left informed. 1731-TRANSFER - OUT REPORT: 
 
Verbal report given to Hermilo Duffy (name) on John Wakefield  being transferred to [address] [place] [address] 
(unit) for routine progression of care Report consisted of patients Situation, Background, Assessment and  
Recommendations(SBAR). Information from the following report(s) SBAR, Intake/Output, MAR, Accordion and Recent Results was reviewed with the receiving nurse. Lines:  
Peripheral IV 05/15/19 Posterior;Right Hand (Active) Site Assessment Clean, dry, & intact 5/17/2019  1:13 PM  
Phlebitis Assessment 0 5/17/2019  1:13 PM  
Infiltration Assessment 0 5/17/2019  1:13 PM  
Dressing Status Clean, dry, & intact 5/17/2019  1:13 PM  
Dressing Type Tape;Transparent 5/17/2019  1:13 PM  
Hub Color/Line Status Blue; Infusing 5/17/2019  1:13 PM  
Action Taken Open ports on tubing capped 5/17/2019  1:13 PM  
Alcohol Cap Used Yes 5/17/2019  1:13 PM  
   
Peripheral IV 05/16/19 Right Antecubital (Active) Site Assessment Clean, dry, & intact 5/17/2019  1:13 PM  
Phlebitis Assessment 0 5/17/2019  1:13 PM  
Infiltration Assessment 0 5/17/2019  1:13 PM  
Dressing Status Clean, dry, & intact 5/17/2019  1:13 PM  
Dressing Type Tape;Transparent 5/17/2019  1:13 PM  
Hub Color/Line Status Pink;Capped 5/17/2019  1:13 PM  
Action Taken Open ports on tubing capped 5/17/2019  1:13 PM  
Alcohol Cap Used Yes 5/17/2019  1:13 PM  
  
 
Opportunity for questions and clarification was provided. Patient transported with: 
Transport

## 2019-05-17 NOTE — PROGRESS NOTES
Problem: Falls - Risk of 
Goal: *Absence of Falls Description Document Sergey Bashir Fall Risk and appropriate interventions in the flowsheet. Outcome: Not Progressing Towards Goal 
  
Problem: Patient Education: Go to Patient Education Activity Goal: Patient/Family Education Outcome: Not Progressing Towards Goal 
  
Problem: Patient Education: Go to Patient Education Activity Goal: Patient/Family Education Outcome: Not Progressing Towards Goal 
  
Problem: Pressure Injury - Risk of 
Goal: *Prevention of pressure injury Description Document Bon Scale and appropriate interventions in the flowsheet. Outcome: Not Progressing Towards Goal 
  
Problem: Patient Education: Go to Patient Education Activity Goal: Patient/Family Education Outcome: Not Progressing Towards Goal 
  
Problem: Patient Education: Go to Patient Education Activity Goal: Patient/Family Education Outcome: Not Progressing Towards Goal 
  
Problem: [address] Goal: *Vital signs within specified parameters Outcome: Not Progressing Towards Goal 
Goal: *Labs within defined limits Outcome: Not Progressing Towards Goal 
Goal: *Absence of infection signs and symptoms Outcome: Not Progressing Towards Goal 
Goal: *Optimal pain control at patient's stated goal 
Outcome: Not Progressing Towards Goal 
Goal: *Skin integrity maintained Outcome: Not Progressing Towards Goal 
Goal: *Fluid volume balance Outcome: Not Progressing Towards Goal 
Goal: *Optimize nutritional status Outcome: Not Progressing Towards Goal 
Goal: *Anxiety reduced or absent Outcome: Not Progressing Towards Goal 
Goal: *Progressive mobility and function (eg: ADL's) Outcome: Not Progressing Towards Goal 
  
Problem: Patient Education: Go to Patient Education Activity Goal: Patient/Family Education Outcome: Not Progressing Towards Goal 
  
Problem: Body Temperature -  Risk of, Imbalanced Goal: *Absence of heat stress or hyperthermia signs and symptoms Outcome: Not Progressing Towards Goal 
Goal: *Absence of cold stress or hypothermia signs and symptoms Outcome: Not Progressing Towards Goal 
  
Problem: Non-Violent Restraints Goal: *Removal from restraints as soon as assessed to be safe Outcome: Not Progressing Towards Goal 
Goal: *No harm/injury to patient while restraints in use Outcome: Not Progressing Towards Goal 
Goal: *Patient's dignity will be maintained Outcome: Not Progressing Towards Goal 
Goal: *Patient Specific Goal (EDIT GOAL, INSERT TEXT) Outcome: Not Progressing Towards Goal 
Goal: Non-violent Restaints:Standard Interventions Outcome: Not Progressing Towards Goal 
Goal: Non-violent Restraints:Patient Interventions Outcome: Not Progressing Towards Goal 
Goal: Patient/Family Education Outcome: Not Progressing Towards Goal

## 2019-05-17 NOTE — PROGRESS NOTES
NUTRITION COMPLETE ASSESSMENT 
 
RECOMMENDATIONS:  
1. Continue diet as ordered 2. Continue daily weights 3. Continue vitamin/mineral regimen-- consider B complex vitamin rather than folic acid/thiamine separate Interventions/Plan:  
Food/Nutrient Delivery:    Commercial supplement -- Ensure [employer] Assessment:  
Reason for Assessment:  
[x]Reassessment Diet: Cardiac, Mechanical soft Nutritionally Significant Medications: [x] Reviewed & Includes: 0.9% sodium chloride @ 50 mL/hr; viamin C 180 mg daily; folic acid 1 mg daily; synthroid daily; flintstones 1 tab daily; thiamine (100 mg), vancomycin q 8 hours; zinc sulfate (24 mg) Meal Intake:  
Patient Vitals for the past 100 hrs: 
 % Diet Eaten 05/17/19 1313 60 % 05/17/19 0850 80 % 05/16/19 1823 50 % 05/16/19 1200 50 % 05/16/19 0800 50 % 05/16/19 0703 120 % 05/15/19 1619 90 % 05/15/19 1018 100 % 05/15/19 0823 90 % Subjective: Pt pleasantly confused. Niece and niece's  at the bedside. She admits her appetite seems to be improving. She has had 2 Ensure shakes so far today. She likes chocolate. Objective: 
Chart reviewed, discussed with RN and team during interdisciplinary rounds. Glad to see pt eating better over the past few days. She ate % of meals. Supplements ordered TID and she is accepting these as well (1050 kcal, 60 g protein)-- if 100% consumed, these would meet 75% and 100% of estimated needs. Patient meets criteria for Severe Protein Calorie Malnutrition as evidenced by:  
ASPEN Malnutrition Criteria Acute Illness, Chronic Illness, or Social/Enviornmental: Chronic illness Energy Intake: Less than/equal to 75% of est energy req for greater than/equal to 1 month Weight Loss: Greater than 10% x 6 mos Body Fat: Severe Muscle Mass: Severe ASPEN Malnutrition Score - Chronic Illness: 24 Chronic Illness - Malnutrition Diagnosis: Severe malnutrition. Estimated Nutrition Needs: Kcals/day: 1400 Kcals/day(7651-4452 kcal/day ) Protein: 56 g Fluid: (1 ml/kcal) Based On: Kcal/kg - specify (Comment) Weight Used: Actual wt Pt expected to meet estimated nutrient needs:  [x]   Yes (with supplements)    []  No [] Unable to predict at this time Nutrition Diagnosis:  
1. Unintended weight loss related to poor appetite and poor dentition as evidenced by 7.7 kg wt loss over the past 6 months. Goals:   
 Consume 50% meals and 2 supplements daily x 5-7 days. Monitoring & Evaluation: - Total energy intake, Liquid meal replacement - Weight/weight change, Electrolyte and renal profile -   
 
Previous Nutrition Goals Met:   Progressing Previous Recommendations:    Progressing Education & Discharge Needs: 
 [x] None Identified 
 [] Identified and addressed [x] Participated in care plan, discharge planning, and/or interdisciplinary rounds Cultural, Alevism and ethnic food preferences identified: None Skin Integrity: []Intact  [x]Other: eczema Edema: [x]None []Other Last BM: 5/16/19 Food Allergies: [x]None []Other Diet Restrictions: Cultural/Orthodox Preference(s): None Anthropometrics:   
Weight Loss Metrics 5/17/2019 11/23/2018 7/28/2016 6/18/2016 3/25/2015 2/16/2013 Today's Wt 109 lb 3.2 oz 121 lb 14.6 oz - 156 lb 162 lb 178 lb BMI 22.06 kg/m2 24.62 kg/m2 - 31.49 kg/m2 27.79 kg/m2 35.93 kg/m2 Weight Source: Bed Height: 4' 11\" (149.9 cm), Body mass index is 22.06 kg/m². IBW : 47.6 kg (105 lb),   
 ,   
 
Labs:   
Lab Results Component Value Date/Time  Sodium 147 (H) 05/17/2019 10:09 AM  
 Potassium 3.4 (L) 05/17/2019 10:09 AM  
 Chloride 113 (H) 05/17/2019 10:09 AM  
 CO2 27 05/17/2019 10:09 AM  
 Glucose 100 05/17/2019 10:09 AM  
 BUN 8 05/17/2019 10:09 AM  
 Creatinine 0.56 05/17/2019 10:09 AM  
 Calcium 8.1 (L) 05/17/2019 10:09 AM  
 Magnesium 2.1 05/16/2019 04:42 AM  
 Phosphorus 3.2 05/16/2019 04:42 AM  
 Albumin 2.1 (L) 05/16/2019 04:42 AM  
 
Lab Results Component Value Date/Time Hemoglobin A1c 4.7 10/27/2018 02:48 AM  
 
Emmanuelle Kumar, [address]

## 2019-05-17 NOTE — PROGRESS NOTES
Problem: Falls - Risk of 
Goal: *Absence of Falls Description Document Lori Bunting Fall Risk and appropriate interventions in the flowsheet. Outcome: Progressing Towards Goal 
 Patient off restraints since this morning, sitter in the room, patient verbalizes understanding of call bell use, however remains impulsive Problem: Pressure Injury - Risk of 
Goal: *Prevention of pressure injury Description Document Bon Scale and appropriate interventions in the flowsheet. Daily skin assessment performed, right thigh skin tear, buttocks two small skin tears - mepilex applied,  venelex applied to heels, feet elevated, on turn team  
 
  
Problem: Body Temperature -  Risk of, Imbalanced Goal: *Absence of heat stress or hyperthermia signs and symptoms Outcome: Progressing Towards Goal 
 
Off niki hugger, maintains normal body temp 97.1

## 2019-05-18 LAB
ANION GAP SERPL CALC-SCNC: 3 MMOL/L (ref 5–15)
BUN SERPL-MCNC: 11 MG/DL (ref 6–20)
BUN/CREAT SERPL: 22 (ref 12–20)
CALCIUM SERPL-MCNC: 7.4 MG/DL (ref 8.5–10.1)
CHLORIDE SERPL-SCNC: 116 MMOL/L (ref 97–108)
CO2 SERPL-SCNC: 26 MMOL/L (ref 21–32)
CREAT SERPL-MCNC: 0.51 MG/DL (ref 0.55–1.02)
GLUCOSE BLD STRIP.AUTO-MCNC: 102 MG/DL (ref 65–100)
GLUCOSE BLD STRIP.AUTO-MCNC: 65 MG/DL (ref 65–100)
GLUCOSE BLD STRIP.AUTO-MCNC: 80 MG/DL (ref 65–100)
GLUCOSE BLD STRIP.AUTO-MCNC: 83 MG/DL (ref 65–100)
GLUCOSE BLD STRIP.AUTO-MCNC: 87 MG/DL (ref 65–100)
GLUCOSE BLD STRIP.AUTO-MCNC: 95 MG/DL (ref 65–100)
GLUCOSE SERPL-MCNC: 61 MG/DL (ref 65–100)
POTASSIUM SERPL-SCNC: 3.7 MMOL/L (ref 3.5–5.1)
SERVICE CMNT-IMP: ABNORMAL
SERVICE CMNT-IMP: NORMAL
SODIUM SERPL-SCNC: 145 MMOL/L (ref 136–145)
ZINC SERPL-MCNC: 52 UG/DL (ref 56–134)

## 2019-05-18 PROCEDURE — 82962 GLUCOSE BLOOD TEST: CPT

## 2019-05-18 PROCEDURE — 80048 BASIC METABOLIC PNL TOTAL CA: CPT

## 2019-05-18 PROCEDURE — 65270000029 HC RM PRIVATE

## 2019-05-18 PROCEDURE — 74011250637 HC RX REV CODE- 250/637: Performed by: INTERNAL MEDICINE

## 2019-05-18 PROCEDURE — 36415 COLL VENOUS BLD VENIPUNCTURE: CPT

## 2019-05-18 PROCEDURE — 74011250636 HC RX REV CODE- 250/636: Performed by: NURSE PRACTITIONER

## 2019-05-18 PROCEDURE — 74011250636 HC RX REV CODE- 250/636: Performed by: INTERNAL MEDICINE

## 2019-05-18 PROCEDURE — 74011250636 HC RX REV CODE- 250/636: Performed by: FAMILY MEDICINE

## 2019-05-18 PROCEDURE — 74011000258 HC RX REV CODE- 258: Performed by: INTERNAL MEDICINE

## 2019-05-18 RX ORDER — LEVOTHYROXINE SODIUM 75 UG/1
75 TABLET ORAL
Status: DISCONTINUED | OUTPATIENT
Start: 2019-05-19 | End: 2019-05-22 | Stop reason: HOSPADM

## 2019-05-18 RX ADMIN — Medication 10 ML: at 13:41

## 2019-05-18 RX ADMIN — Medication 100 MG: at 08:59

## 2019-05-18 RX ADMIN — WHITE PETROLATUM: 1.75 OINTMENT TOPICAL at 16:43

## 2019-05-18 RX ADMIN — LEVOTHYROXINE SODIUM 50 MCG: 50 TABLET ORAL at 06:28

## 2019-05-18 RX ADMIN — CEFEPIME 2 G: 2 INJECTION, POWDER, FOR SOLUTION INTRAVENOUS at 08:47

## 2019-05-18 RX ADMIN — HEPARIN SODIUM 5000 UNITS: 5000 INJECTION INTRAVENOUS; SUBCUTANEOUS at 09:02

## 2019-05-18 RX ADMIN — NYSTATIN: 100000 POWDER TOPICAL at 16:42

## 2019-05-18 RX ADMIN — CEFEPIME 2 G: 2 INJECTION, POWDER, FOR SOLUTION INTRAVENOUS at 21:36

## 2019-05-18 RX ADMIN — FOLIC ACID 1 MG: 1 TABLET ORAL at 08:59

## 2019-05-18 RX ADMIN — WHITE PETROLATUM: 1.75 OINTMENT TOPICAL at 09:05

## 2019-05-18 RX ADMIN — HYDROCORTISONE 10 MG: 10 TABLET ORAL at 16:41

## 2019-05-18 RX ADMIN — VANCOMYCIN HYDROCHLORIDE 750 MG: 750 INJECTION, POWDER, LYOPHILIZED, FOR SOLUTION INTRAVENOUS at 16:35

## 2019-05-18 RX ADMIN — HEPARIN SODIUM 5000 UNITS: 5000 INJECTION INTRAVENOUS; SUBCUTANEOUS at 17:33

## 2019-05-18 RX ADMIN — SODIUM CHLORIDE 50 ML/HR: 900 INJECTION, SOLUTION INTRAVENOUS at 00:09

## 2019-05-18 RX ADMIN — HYDROCORTISONE 20 MG: 10 TABLET ORAL at 06:28

## 2019-05-18 RX ADMIN — Medication 10 ML: at 08:48

## 2019-05-18 RX ADMIN — Medication 23.7 MG: at 09:08

## 2019-05-18 RX ADMIN — CASTOR OIL AND BALSAM, PERU: 788; 87 OINTMENT TOPICAL at 16:42

## 2019-05-18 RX ADMIN — NYSTATIN: 100000 POWDER TOPICAL at 10:42

## 2019-05-18 RX ADMIN — CASTOR OIL AND BALSAM, PERU: 788; 87 OINTMENT TOPICAL at 09:05

## 2019-05-18 RX ADMIN — OXYCODONE HYDROCHLORIDE AND ACETAMINOPHEN 500 MG: 500 TABLET ORAL at 08:59

## 2019-05-18 RX ADMIN — Medication 1 TABLET: at 08:59

## 2019-05-18 NOTE — PROGRESS NOTES
Hospitalist Progress Note Gala Limon MD 
Answering service: [phone] OR [phone] from in house phone Cell: [phone] Date of Service:  2019 NAME:  Amanda Crowley :  10/11/1932 MRN:  [id number] Admission Summary:  
 
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 The patient is a very poor historian, not much history could be obtained from her, although she is alert, oriented x3, but does not provide much history, reports that she has been having some decreased appetite and has not been feeling well. The patient reports that she has not taken a bath, has not been taking good care of herself since the time she was discharged from the hospital in 10/2018. The patient reports that she has had some chills, decreased appetite and has not been sleeping well. The patient reports that today \"she felt weaker than usual,\" got concerned and decided to come to the hospital 
 
Interval history / Subjective:  
  
 
2019 : 
 
Pt feels better day over dya. No issues over night,  Bs/bp/temp all stable ; cont current rx. Cardiac m noted, for ? Echo. Doubt new, likely missed. Assessment & Plan: Hypoglycemia due to poor oral intake 
-finger stick glucose dropped to 54 last night, on  D10 W at 50 ml/hr 
-encourage po intake 
-continue monitor finger stick glucose 
- off iv dextrose today Resolving with endo replacement Hypernatremia due to dehydration due to poor oral intake 
-improved with IVF to D10W at 50 ml hr, Na 137 
-repeat bmp in am resolved f/u lab for 15 am  
 
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    F/u TSH however is reasonable as started low intro dose 5/15/2019 and advance dose slowly Sepsis/sirs; under eval as of early am 5/16. - seem resolved, as an acute clinical issues, de escalate abx over next couple of days. Hypothermia  likely 2n2 pan hypo pit status  
-afebrile, no leukocytosis  
-chest x ray no acute process 
-temp was 94.8 5/12 
-resolved, off niki hugger  
  
Hx of HTN 
-BP normal, add norvasc,monitor BP 
BP Readings from Last 1 Encounters:  
05/18/19 131/54  
  
  
syst m, to follow ? Echo. 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 
-outpatient follow up at [facility] with neurosurgeon  
  
Chronic severe eczema  Could be assoc with vit deficiency/mal nurishement.  
-continue protective skin cream 
  
Debility 
-PT/OT Hx of recurrent fall  
-PT/OT 
-Fall precaution Severe protein calorie malnutrition  
-soft diet with nutritional supplement Lab Results Component Value Date/Time Protein, total 6.4 05/16/2019 04:42 AM  
 Albumin 2.1 (L) 05/16/2019 04:42 AM  
 pt is effectively edentulous, she does like supplements. ; nutri consult and for vit eval /replacement empirically.  
  
Full Code; at risk for decompensation, palliative care team consulted 
  
Code status: Full Code DVT prophylaxis: heparin Care Plan discussed with: Patient/Family, Nurse and  Disposition: SNF/Rehab Hospital Problems  Date Reviewed: 5/11/2019 Codes Class Noted POA Panhypopituitarism ([facility] [address] ICD-10-CM: E23.0 ICD-9-CM: 253.2  5/15/2019 Unknown Suprasellar mass ICD-10-CM: R22.0 ICD-9-CM: 784.2  5/15/2019 Unknown Ros; No cp no sob no n/v/d/. Is weak but feeling stronger Vital Signs:  
 Last 24hrs VS reviewed since prior progress note. Most recent are: 
Visit Vitals /54 (BP 1 Location: Left arm, BP Patient Position: At rest) Pulse 72 Temp 99.2 °F (37.3 °C) Resp 19 Ht 4' 11\" (1.499 m) Wt 49.5 kg (109 lb 3.2 oz) SpO2 99% BMI 22.06 kg/m² Intake/Output Summary (Last 24 hours) at 5/18/2019 1534 Last data filed at 5/18/2019 5039 Gross per 24 hour Intake 4976 ml Output 200 ml Net 4776 ml Physical Examination:  
 
 
    Stable exam  
Constitutional:  No acute distress, cooperative, pleasant   
ENT:  Oral mucous moist, oropharynx benign. Neck supple, Resp:  CTA bilaterally. No wheezing/rhonchi/rales. No accessory muscle use CV:  Regular rhythm, normal rate, + murmurs, no gallops, GI:  Soft, non distended, non tender. normoactive bowel sounds, no hepatosplenomegaly Musculoskeletal:  No edema, Neurologic:  Moves all extremities. AAOx3, CN II-XII reviewed Skin:  diffuse chronic eczema all over Data Review:  
 Review and/or order of clinical lab test 
 
 
Labs:  
 
Recent Labs 05/16/19 
5666 WBC 7.0 HGB 11.2* HCT 35.5 * Recent Labs 05/18/19 
1044 05/17/19 
1009 05/16/19 
3350  147* 143  
K 3.7 3.4* 3.9 * 113* 109* CO2 26 27 28 BUN 11 8 10 CREA 0.51* 0.56 0.67 GLU 61* 100 73 CA 7.4* 8.1* 8.6 MG  --   --  2.1 PHOS  --   --  3.2 Recent Labs 05/16/19 
7753 SGOT 93* ALT 50 * TBILI 0.6 TP 6.4 ALB 2.1*  
GLOB 4.3* No results for input(s): INR, PTP, APTT in the last 72 hours. No lab exists for component: INREXT, INREXT No results for input(s): FE, TIBC, PSAT, FERR in the last 72 hours. Lab Results Component Value Date/Time Folate 16.8 05/17/2019 10:09 AM  
  
No results for input(s): PH, PCO2, PO2 in the last 72 hours. No results for input(s): CPK, CKNDX, TROIQ in the last 72 hours. No lab exists for component: CPKMB No results found for: CHOL, CHOLX, CHLST, CHOLV, HDL, LDL, LDLC, DLDLP, TGLX, TRIGL, TRIGP, CHHD, CHHDX Lab Results Component Value Date/Time  Glucose (POC) 102 (H) 05/18/2019 11:16 AM  
 Glucose (POC) 87 05/18/2019 07:00 AM  
 Glucose (POC) 65 05/18/2019 06:19 AM  
 Glucose (POC) 95 05/18/2019 03:22 AM  
 Glucose (POC) 87 05/17/2019 09:53 PM  
 
Lab Results Component Value Date/Time Color YELLOW/STRAW 05/16/2019 06:17 AM  
 Appearance CLOUDY (A) 05/16/2019 06:17 AM  
 Specific gravity 1.013 05/16/2019 06:17 AM  
 pH (UA) 7.0 05/16/2019 06:17 AM  
 Protein NEGATIVE  05/16/2019 06:17 AM  
 Glucose NEGATIVE  05/16/2019 06:17 AM  
 Ketone NEGATIVE  05/16/2019 06:17 AM  
 Bilirubin NEGATIVE  05/16/2019 06:17 AM  
 Urobilinogen 1.0 05/16/2019 06:17 AM  
 Nitrites NEGATIVE  05/16/2019 06:17 AM  
 Leukocyte Esterase NEGATIVE  05/16/2019 06:17 AM  
 Epithelial cells FEW 05/16/2019 06:17 AM  
 Bacteria 2+ (A) 05/16/2019 06:17 AM  
 WBC 0-4 05/16/2019 06:17 AM  
 RBC 0-5 05/16/2019 06:17 AM  
 
 
 
Medications Reviewed:  
 
Current Facility-Administered Medications Medication Dose Route Frequency  [START ON 5/19/2019] levothyroxine (SYNTHROID) tablet 75 mcg  75 mcg Oral ACB  nystatin (MYCOSTATIN) 100,000 unit/gram powder   Topical BID  
 balsam peru-castor oil (VENELEX) ointment   Topical BID  
 0.9% sodium chloride infusion  50 mL/hr IntraVENous CONTINUOUS  
 cefepime (MAXIPIME) 2 g in 0.9% sodium chloride (MBP/ADV) 100 mL  2 g IntraVENous Q12H  Vancomycin- Pharmacy to Dose   Other Rx Dosing/Monitoring  folic acid (FOLVITE) tablet 1 mg  1 mg Oral DAILY  thiamine HCL (B-1) tablet 100 mg  100 mg Oral DAILY  ascorbic acid (vitamin C) (VITAMIN C) tablet 500 mg  500 mg Oral DAILY  zinc sulfate (10 mg/mL elemental zinc) oral suspension (44 mg/mL zinc sulfate)  0.5 mg/kg/day Oral DAILY  multivitamin with iron (FLINTSTONES) chewable tablet 1 Tab  1 Tab Oral DAILY  hydrocortisone (CORTEF) tablet 20 mg  20 mg Oral ACB  hydrocortisone (CORTEF) tablet 10 mg  10 mg Oral ACD  vancomycin (VANCOCIN) 750 mg in 0.9% sodium chloride (MBP/ADV) 250 mL  750 mg IntraVENous Q18H  pantothenic ac-min oil-pet,hyd (AQUAPHOR) 41 % ointment   Topical BID  
  glucose chewable tablet 16 g  4 Tab Oral PRN  
 dextrose (D50W) injection syrg 12.5-25 g  12.5-25 g IntraVENous PRN  
 glucagon (GLUCAGEN) injection 1 mg  1 mg IntraMUSCular PRN  
 sodium chloride (NS) flush 5-40 mL  5-40 mL IntraVENous Q8H  
 sodium chloride (NS) flush 5-40 mL  5-40 mL IntraVENous PRN  
 acetaminophen (TYLENOL) tablet 650 mg  650 mg Oral Q4H PRN  
 heparin (porcine) injection 5,000 Units  5,000 Units SubCUTAneous Q8H  
 
______________________________________________________________________ EXPECTED LENGTH OF STAY: 3d 7h 
ACTUAL LENGTH OF STAY:          7 Mila Murrell MD

## 2019-05-19 LAB
ANION GAP SERPL CALC-SCNC: 4 MMOL/L (ref 5–15)
BUN SERPL-MCNC: 9 MG/DL (ref 6–20)
BUN/CREAT SERPL: 18 (ref 12–20)
CALCIUM SERPL-MCNC: 7.1 MG/DL (ref 8.5–10.1)
CHLORIDE SERPL-SCNC: 115 MMOL/L (ref 97–108)
CO2 SERPL-SCNC: 29 MMOL/L (ref 21–32)
CREAT SERPL-MCNC: 0.51 MG/DL (ref 0.55–1.02)
GLUCOSE BLD STRIP.AUTO-MCNC: 115 MG/DL (ref 65–100)
GLUCOSE BLD STRIP.AUTO-MCNC: 139 MG/DL (ref 65–100)
GLUCOSE BLD STRIP.AUTO-MCNC: 144 MG/DL (ref 65–100)
GLUCOSE BLD STRIP.AUTO-MCNC: 73 MG/DL (ref 65–100)
GLUCOSE BLD STRIP.AUTO-MCNC: 73 MG/DL (ref 65–100)
GLUCOSE BLD STRIP.AUTO-MCNC: 82 MG/DL (ref 65–100)
GLUCOSE SERPL-MCNC: 62 MG/DL (ref 65–100)
POTASSIUM SERPL-SCNC: 3.1 MMOL/L (ref 3.5–5.1)
SERVICE CMNT-IMP: ABNORMAL
SERVICE CMNT-IMP: NORMAL
SODIUM SERPL-SCNC: 148 MMOL/L (ref 136–145)

## 2019-05-19 PROCEDURE — 65270000029 HC RM PRIVATE

## 2019-05-19 PROCEDURE — 80048 BASIC METABOLIC PNL TOTAL CA: CPT

## 2019-05-19 PROCEDURE — 36415 COLL VENOUS BLD VENIPUNCTURE: CPT

## 2019-05-19 PROCEDURE — 74011250637 HC RX REV CODE- 250/637: Performed by: INTERNAL MEDICINE

## 2019-05-19 PROCEDURE — 74011000258 HC RX REV CODE- 258: Performed by: INTERNAL MEDICINE

## 2019-05-19 PROCEDURE — 74011250636 HC RX REV CODE- 250/636: Performed by: FAMILY MEDICINE

## 2019-05-19 PROCEDURE — 82962 GLUCOSE BLOOD TEST: CPT

## 2019-05-19 PROCEDURE — 94760 N-INVAS EAR/PLS OXIMETRY 1: CPT

## 2019-05-19 PROCEDURE — 74011250637 HC RX REV CODE- 250/637: Performed by: NURSE PRACTITIONER

## 2019-05-19 PROCEDURE — 74011250636 HC RX REV CODE- 250/636: Performed by: INTERNAL MEDICINE

## 2019-05-19 PROCEDURE — 74011250636 HC RX REV CODE- 250/636: Performed by: NURSE PRACTITIONER

## 2019-05-19 RX ORDER — POTASSIUM CHLORIDE 750 MG/1
40 TABLET, FILM COATED, EXTENDED RELEASE ORAL
Status: COMPLETED | OUTPATIENT
Start: 2019-05-19 | End: 2019-05-19

## 2019-05-19 RX ORDER — POTASSIUM CHLORIDE 750 MG/1
40 TABLET, FILM COATED, EXTENDED RELEASE ORAL
Status: DISCONTINUED | OUTPATIENT
Start: 2019-05-19 | End: 2019-05-19

## 2019-05-19 RX ADMIN — FOLIC ACID 1 MG: 1 TABLET ORAL at 08:19

## 2019-05-19 RX ADMIN — SODIUM CHLORIDE 50 ML/HR: 900 INJECTION, SOLUTION INTRAVENOUS at 21:24

## 2019-05-19 RX ADMIN — Medication 100 MG: at 08:19

## 2019-05-19 RX ADMIN — Medication 1 TABLET: at 08:19

## 2019-05-19 RX ADMIN — HEPARIN SODIUM 5000 UNITS: 5000 INJECTION INTRAVENOUS; SUBCUTANEOUS at 16:37

## 2019-05-19 RX ADMIN — SODIUM CHLORIDE 50 ML/HR: 900 INJECTION, SOLUTION INTRAVENOUS at 02:03

## 2019-05-19 RX ADMIN — WHITE PETROLATUM: 1.75 OINTMENT TOPICAL at 09:54

## 2019-05-19 RX ADMIN — CASTOR OIL AND BALSAM, PERU: 788; 87 OINTMENT TOPICAL at 08:20

## 2019-05-19 RX ADMIN — HYDROCORTISONE 20 MG: 10 TABLET ORAL at 06:31

## 2019-05-19 RX ADMIN — HEPARIN SODIUM 5000 UNITS: 5000 INJECTION INTRAVENOUS; SUBCUTANEOUS at 08:19

## 2019-05-19 RX ADMIN — LEVOTHYROXINE SODIUM 75 MCG: 75 TABLET ORAL at 06:31

## 2019-05-19 RX ADMIN — CEFEPIME 2 G: 2 INJECTION, POWDER, FOR SOLUTION INTRAVENOUS at 20:35

## 2019-05-19 RX ADMIN — POTASSIUM CHLORIDE 40 MEQ: 750 TABLET, EXTENDED RELEASE ORAL at 04:31

## 2019-05-19 RX ADMIN — NYSTATIN: 100000 POWDER TOPICAL at 18:00

## 2019-05-19 RX ADMIN — CASTOR OIL AND BALSAM, PERU: 788; 87 OINTMENT TOPICAL at 17:57

## 2019-05-19 RX ADMIN — NYSTATIN: 100000 POWDER TOPICAL at 08:19

## 2019-05-19 RX ADMIN — CEFEPIME 2 G: 2 INJECTION, POWDER, FOR SOLUTION INTRAVENOUS at 09:45

## 2019-05-19 RX ADMIN — OXYCODONE HYDROCHLORIDE AND ACETAMINOPHEN 500 MG: 500 TABLET ORAL at 08:19

## 2019-05-19 RX ADMIN — HEPARIN SODIUM 5000 UNITS: 5000 INJECTION INTRAVENOUS; SUBCUTANEOUS at 01:20

## 2019-05-19 RX ADMIN — Medication 23.7 MG: at 09:53

## 2019-05-19 RX ADMIN — HYDROCORTISONE 10 MG: 10 TABLET ORAL at 16:37

## 2019-05-19 NOTE — PROGRESS NOTES
Hospitalist Progress Note Josi Park MD 
Answering service: [phone] OR [phone] from in house phone Cell: [phone] Date of Service:  2019 NAME:  Enrique Parada :  10/11/1932 MRN:  [id number] Admission Summary:  
 
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 The patient is a very poor historian, not much history could be obtained from her, although she is alert, oriented x3, but does not provide much history, reports that she has been having some decreased appetite and has not been feeling well. The patient reports that she has not taken a bath, has not been taking good care of herself since the time she was discharged from the hospital in 10/2018. The patient reports that she has had some chills, decreased appetite and has not been sleeping well. The patient reports that today \"she felt weaker than usual,\" got concerned and decided to come to the hospital 
 
Interval history / Subjective:  
  
 
2019 :  
Leenauvenia Rinne escalating  abx 2019 . Pt w/o complaints Assessment & Plan: Hypoglycemia due to poor oral intake 
-finger stick glucose dropped to 54 last night, on  D10 W at 50 ml/hr 
-encourage po intake 
-continue monitor finger stick glucose 
- off iv dextrose today Resolving with endo replacement  syntrhroid now at reasonable replacement level, to be followed as out pt. ;  
 
Hypernatremia due to dehydration due to poor oral intake 
-improved with IVF to D10W at 50 ml hr, Na 137 
-repeat bmp in am resolved f/u lab for 5/15 am  
 
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    F/u TSH however is reasonable as started low intro dose 5/15/2019 and advance dose slowly  ; at current discharge dosing of synthroid and cortef. Sepsis/sirs; under eval as of early am 5/16. - seem resolved, as an acute clinical issues, de escalate abx over next couple of days. De escalating abx 5/19/2019 Hypothermia  likely 2n2 pan hypo pit status  
-afebrile, no leukocytosis  
-chest x ray no acute process 
-temp was 94.8 5/12 
-resolved, off niki hugger  
  
Hx of HTN 
-BP normal, add norvasc,monitor BP 
BP Readings from Last 1 Encounters:  
05/19/19 165/72  
  
  
syst m, to follow ? Echo. 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 
-outpatient follow up at [facility] with neurosurgeon  
  
Chronic severe eczema  Could be assoc with vit deficiency/mal nurishement.  
-continue protective skin cream 
  
Debility 
-PT/OT Hx of recurrent fall  
-PT/OT 
-Fall precaution Severe protein calorie malnutrition  
-soft diet with nutritional supplement Lab Results Component Value Date/Time Protein, total 6.4 05/16/2019 04:42 AM  
 Albumin 2.1 (L) 05/16/2019 04:42 AM  
 pt is effectively edentulous, she does like supplements. ; nutri consult and for vit eval /replacement empirically.  
  
Full Code; at risk for decompensation, palliative care team consulted 
  
Code status: Full Code DVT prophylaxis: heparin Care Plan discussed with: Patient/Family, Nurse and  Disposition: SNF/Rehab Hospital Problems  Date Reviewed: 5/11/2019 Codes Class Noted POA Panhypopituitarism ([facility] [address] ICD-10-CM: E23.0 ICD-9-CM: 253.2  5/15/2019 Unknown Suprasellar mass ICD-10-CM: R22.0 ICD-9-CM: 784.2  5/15/2019 Unknown Ros; No cp, no so b no n/v/d/f/chills, over all feels better. Vital Signs:  
 Last 24hrs VS reviewed since prior progress note. Most recent are: 
Visit Vitals /72 (BP 1 Location: Left arm, BP Patient Position: At rest) Pulse 73 Temp 98.9 °F (37.2 °C) Resp 19 Ht 4' 11\" (1.499 m) Wt 49.5 kg (109 lb 3.2 oz) SpO2 98% BMI 22.06 kg/m² Intake/Output Summary (Last 24 hours) at 5/19/2019 8473 Last data filed at 5/18/2019 2136 Gross per 24 hour Intake 955 ml Output  Net 955 ml Physical Examination:  
 
 
    Stable exam 5/19/2019 Constitutional:  No acute distress, cooperative, pleasant   
ENT:  Oral mucous moist, oropharynx benign. Neck supple, Resp:  CTA bilaterally. No wheezing/rhonchi/rales. No accessory muscle use CV:  Regular rhythm, normal rate, + murmurs, no gallops, GI:  Soft, non distended, non tender. normoactive bowel sounds, no hepatosplenomegaly Musculoskeletal:  No edema, Neurologic:  Moves all extremities. AAOx3, CN II-XII reviewed Skin:  diffuse chronic eczema all over Data Review:  
 Review and/or order of clinical lab test 
 
 
Labs:  
 
No results for input(s): WBC, HGB, HCT, PLT, HGBEXT, HCTEXT, PLTEXT, HGBEXT, HCTEXT, PLTEXT in the last 72 hours. Recent Labs 05/19/19 
0211 05/18/19 
0209 05/17/19 
1009 * 145 147* K 3.1* 3.7 3.4*  
* 116* 113* CO2 29 26 27 BUN 9 11 8 CREA 0.51* 0.51* 0.56 GLU 62* 61* 100 CA 7.1* 7.4* 8.1* No results for input(s): SGOT, GPT, ALT, AP, TBIL, TBILI, TP, ALB, GLOB, GGT, AML, LPSE in the last 72 hours. No lab exists for component: AMYP, HLPSE No results for input(s): INR, PTP, APTT in the last 72 hours. No lab exists for component: INREXT, INREXT No results for input(s): FE, TIBC, PSAT, FERR in the last 72 hours. Lab Results Component Value Date/Time Folate 16.8 05/17/2019 10:09 AM  
  
No results for input(s): PH, PCO2, PO2 in the last 72 hours. No results for input(s): CPK, CKNDX, TROIQ in the last 72 hours. No lab exists for component: CPKMB No results found for: CHOL, CHOLX, CHLST, CHOLV, HDL, LDL, LDLC, DLDLP, TGLX, TRIGL, TRIGP, CHHD, CHHDX Lab Results Component Value Date/Time Glucose (POC) 73 05/19/2019 06:24 AM  
 Glucose (POC) 82 05/19/2019 04:34 AM  
 Glucose (POC) 73 05/19/2019 04:12 AM  
 Glucose (POC) 80 05/18/2019 10:29 PM  
 Glucose (POC) 83 05/18/2019 04:24 PM  
 
Lab Results Component Value Date/Time Color YELLOW/STRAW 05/16/2019 06:17 AM  
 Appearance CLOUDY (A) 05/16/2019 06:17 AM  
 Specific gravity 1.013 05/16/2019 06:17 AM  
 pH (UA) 7.0 05/16/2019 06:17 AM  
 Protein NEGATIVE  05/16/2019 06:17 AM  
 Glucose NEGATIVE  05/16/2019 06:17 AM  
 Ketone NEGATIVE  05/16/2019 06:17 AM  
 Bilirubin NEGATIVE  05/16/2019 06:17 AM  
 Urobilinogen 1.0 05/16/2019 06:17 AM  
 Nitrites NEGATIVE  05/16/2019 06:17 AM  
 Leukocyte Esterase NEGATIVE  05/16/2019 06:17 AM  
 Epithelial cells FEW 05/16/2019 06:17 AM  
 Bacteria 2+ (A) 05/16/2019 06:17 AM  
 WBC 0-4 05/16/2019 06:17 AM  
 RBC 0-5 05/16/2019 06:17 AM  
 
 
 
Medications Reviewed:  
 
Current Facility-Administered Medications Medication Dose Route Frequency  levothyroxine (SYNTHROID) tablet 75 mcg  75 mcg Oral ACB  nystatin (MYCOSTATIN) 100,000 unit/gram powder   Topical BID  
 balsam peru-castor oil (VENELEX) ointment   Topical BID  
 0.9% sodium chloride infusion  50 mL/hr IntraVENous CONTINUOUS  
 cefepime (MAXIPIME) 2 g in 0.9% sodium chloride (MBP/ADV) 100 mL  2 g IntraVENous U49G  
 folic acid (FOLVITE) tablet 1 mg  1 mg Oral DAILY  thiamine HCL (B-1) tablet 100 mg  100 mg Oral DAILY  ascorbic acid (vitamin C) (VITAMIN C) tablet 500 mg  500 mg Oral DAILY  zinc sulfate (10 mg/mL elemental zinc) oral suspension (44 mg/mL zinc sulfate)  0.5 mg/kg/day Oral DAILY  multivitamin with iron (FLINTSTONES) chewable tablet 1 Tab  1 Tab Oral DAILY  hydrocortisone (CORTEF) tablet 20 mg  20 mg Oral ACB  hydrocortisone (CORTEF) tablet 10 mg  10 mg Oral ACD  pantothenic ac-min oil-pet,hyd (AQUAPHOR) 41 % ointment   Topical BID  
 glucose chewable tablet 16 g  4 Tab Oral PRN  
  dextrose (D50W) injection syrg 12.5-25 g  12.5-25 g IntraVENous PRN  
 glucagon (GLUCAGEN) injection 1 mg  1 mg IntraMUSCular PRN  
 sodium chloride (NS) flush 5-40 mL  5-40 mL IntraVENous Q8H  
 sodium chloride (NS) flush 5-40 mL  5-40 mL IntraVENous PRN  
 acetaminophen (TYLENOL) tablet 650 mg  650 mg Oral Q4H PRN  
 heparin (porcine) injection 5,000 Units  5,000 Units SubCUTAneous Q8H  
 
______________________________________________________________________ EXPECTED LENGTH OF STAY: 3d 7h 
ACTUAL LENGTH OF STAY:          8 Elzbieta Crews MD

## 2019-05-20 LAB
ANION GAP SERPL CALC-SCNC: 3 MMOL/L (ref 5–15)
BASOPHILS # BLD: 0 K/UL (ref 0–0.1)
BASOPHILS NFR BLD: 0 % (ref 0–1)
BUN SERPL-MCNC: 11 MG/DL (ref 6–20)
BUN/CREAT SERPL: 24 (ref 12–20)
CALCIUM SERPL-MCNC: 7.2 MG/DL (ref 8.5–10.1)
CHLORIDE SERPL-SCNC: 115 MMOL/L (ref 97–108)
CO2 SERPL-SCNC: 29 MMOL/L (ref 21–32)
CREAT SERPL-MCNC: 0.46 MG/DL (ref 0.55–1.02)
DIFFERENTIAL METHOD BLD: ABNORMAL
EOSINOPHIL # BLD: 0.1 K/UL (ref 0–0.4)
EOSINOPHIL NFR BLD: 1 % (ref 0–7)
ERYTHROCYTE [DISTWIDTH] IN BLOOD BY AUTOMATED COUNT: 18.7 % (ref 11.5–14.5)
GLUCOSE BLD STRIP.AUTO-MCNC: 109 MG/DL (ref 65–100)
GLUCOSE BLD STRIP.AUTO-MCNC: 118 MG/DL (ref 65–100)
GLUCOSE BLD STRIP.AUTO-MCNC: 67 MG/DL (ref 65–100)
GLUCOSE BLD STRIP.AUTO-MCNC: 74 MG/DL (ref 65–100)
GLUCOSE BLD STRIP.AUTO-MCNC: 74 MG/DL (ref 65–100)
GLUCOSE BLD STRIP.AUTO-MCNC: 78 MG/DL (ref 65–100)
GLUCOSE BLD STRIP.AUTO-MCNC: 94 MG/DL (ref 65–100)
GLUCOSE BLD STRIP.AUTO-MCNC: 96 MG/DL (ref 65–100)
GLUCOSE BLD STRIP.AUTO-MCNC: 99 MG/DL (ref 65–100)
GLUCOSE SERPL-MCNC: 67 MG/DL (ref 65–100)
HCT VFR BLD AUTO: 24.9 % (ref 35–47)
HGB BLD-MCNC: 8 G/DL (ref 11.5–16)
IMM GRANULOCYTES # BLD AUTO: 0.1 K/UL (ref 0–0.04)
IMM GRANULOCYTES NFR BLD AUTO: 1 % (ref 0–0.5)
LYMPHOCYTES # BLD: 1.8 K/UL (ref 0.8–3.5)
LYMPHOCYTES NFR BLD: 16 % (ref 12–49)
MCH RBC QN AUTO: 26.7 PG (ref 26–34)
MCHC RBC AUTO-ENTMCNC: 32.1 G/DL (ref 30–36.5)
MCV RBC AUTO: 83 FL (ref 80–99)
MONOCYTES # BLD: 1.3 K/UL (ref 0–1)
MONOCYTES NFR BLD: 12 % (ref 5–13)
NEUTS SEG # BLD: 8 K/UL (ref 1.8–8)
NEUTS SEG NFR BLD: 71 % (ref 32–75)
NRBC # BLD: 0 K/UL (ref 0–0.01)
NRBC BLD-RTO: 0 PER 100 WBC
PLATELET # BLD AUTO: 164 K/UL (ref 150–400)
PMV BLD AUTO: 11.8 FL (ref 8.9–12.9)
POTASSIUM SERPL-SCNC: 3.3 MMOL/L (ref 3.5–5.1)
RBC # BLD AUTO: 3 M/UL (ref 3.8–5.2)
SERVICE CMNT-IMP: ABNORMAL
SERVICE CMNT-IMP: ABNORMAL
SERVICE CMNT-IMP: NORMAL
SODIUM SERPL-SCNC: 147 MMOL/L (ref 136–145)
WBC # BLD AUTO: 11.3 K/UL (ref 3.6–11)

## 2019-05-20 PROCEDURE — 80048 BASIC METABOLIC PNL TOTAL CA: CPT

## 2019-05-20 PROCEDURE — 74011250636 HC RX REV CODE- 250/636: Performed by: NURSE PRACTITIONER

## 2019-05-20 PROCEDURE — 65270000029 HC RM PRIVATE

## 2019-05-20 PROCEDURE — 74011000258 HC RX REV CODE- 258: Performed by: INTERNAL MEDICINE

## 2019-05-20 PROCEDURE — 74011250637 HC RX REV CODE- 250/637: Performed by: HOSPITALIST

## 2019-05-20 PROCEDURE — 74011250636 HC RX REV CODE- 250/636: Performed by: FAMILY MEDICINE

## 2019-05-20 PROCEDURE — 97535 SELF CARE MNGMENT TRAINING: CPT

## 2019-05-20 PROCEDURE — 82962 GLUCOSE BLOOD TEST: CPT

## 2019-05-20 PROCEDURE — 74011250636 HC RX REV CODE- 250/636: Performed by: INTERNAL MEDICINE

## 2019-05-20 PROCEDURE — 36415 COLL VENOUS BLD VENIPUNCTURE: CPT

## 2019-05-20 PROCEDURE — 74011250637 HC RX REV CODE- 250/637: Performed by: INTERNAL MEDICINE

## 2019-05-20 PROCEDURE — 94760 N-INVAS EAR/PLS OXIMETRY 1: CPT

## 2019-05-20 PROCEDURE — 85025 COMPLETE CBC W/AUTO DIFF WBC: CPT

## 2019-05-20 RX ORDER — AMLODIPINE BESYLATE 5 MG/1
5 TABLET ORAL DAILY
Status: DISCONTINUED | OUTPATIENT
Start: 2019-05-20 | End: 2019-05-22 | Stop reason: HOSPADM

## 2019-05-20 RX ADMIN — Medication 100 MG: at 10:00

## 2019-05-20 RX ADMIN — NYSTATIN: 100000 POWDER TOPICAL at 10:02

## 2019-05-20 RX ADMIN — CASTOR OIL AND BALSAM, PERU: 788; 87 OINTMENT TOPICAL at 10:01

## 2019-05-20 RX ADMIN — HYDROCORTISONE 20 MG: 10 TABLET ORAL at 06:30

## 2019-05-20 RX ADMIN — Medication 10 ML: at 05:50

## 2019-05-20 RX ADMIN — HEPARIN SODIUM 5000 UNITS: 5000 INJECTION INTRAVENOUS; SUBCUTANEOUS at 17:07

## 2019-05-20 RX ADMIN — HYDROCORTISONE 10 MG: 10 TABLET ORAL at 17:07

## 2019-05-20 RX ADMIN — FOLIC ACID 1 MG: 1 TABLET ORAL at 10:01

## 2019-05-20 RX ADMIN — Medication 23.7 MG: at 10:05

## 2019-05-20 RX ADMIN — LEVOTHYROXINE SODIUM 75 MCG: 75 TABLET ORAL at 06:30

## 2019-05-20 RX ADMIN — HEPARIN SODIUM 5000 UNITS: 5000 INJECTION INTRAVENOUS; SUBCUTANEOUS at 09:59

## 2019-05-20 RX ADMIN — WHITE PETROLATUM: 1.75 OINTMENT TOPICAL at 10:02

## 2019-05-20 RX ADMIN — AMLODIPINE BESYLATE 5 MG: 5 TABLET ORAL at 15:06

## 2019-05-20 RX ADMIN — WHITE PETROLATUM: 1.75 OINTMENT TOPICAL at 17:47

## 2019-05-20 RX ADMIN — HEPARIN SODIUM 5000 UNITS: 5000 INJECTION INTRAVENOUS; SUBCUTANEOUS at 01:06

## 2019-05-20 RX ADMIN — CASTOR OIL AND BALSAM, PERU: 788; 87 OINTMENT TOPICAL at 17:08

## 2019-05-20 RX ADMIN — SODIUM CHLORIDE 50 ML/HR: 900 INJECTION, SOLUTION INTRAVENOUS at 18:58

## 2019-05-20 RX ADMIN — CEFEPIME 2 G: 2 INJECTION, POWDER, FOR SOLUTION INTRAVENOUS at 20:40

## 2019-05-20 RX ADMIN — NYSTATIN: 100000 POWDER TOPICAL at 17:08

## 2019-05-20 RX ADMIN — OXYCODONE HYDROCHLORIDE AND ACETAMINOPHEN 500 MG: 500 TABLET ORAL at 10:00

## 2019-05-20 RX ADMIN — CEFEPIME 2 G: 2 INJECTION, POWDER, FOR SOLUTION INTRAVENOUS at 10:00

## 2019-05-20 RX ADMIN — Medication 10 ML: at 15:07

## 2019-05-20 RX ADMIN — Medication 10 ML: at 20:42

## 2019-05-20 RX ADMIN — Medication 1 TABLET: at 10:00

## 2019-05-20 NOTE — PROGRESS NOTES
Physical Therapy Note Patient is sleeping soundly in bed. She declines PT at this time. Thank you, 
Gabriella Chapman DPT

## 2019-05-20 NOTE — PROGRESS NOTES
BS taken Bossman Ding 254 notified,BS rechecked @ 74. RN notified of findings. Pt is alert and oriented x's 2 intermittently, however this is pt's baseline. Pt responding appropriately to verbal stimuli. No s/s of distress PRN Hypoglycemic protocol initiated at 0705. Pt made aware however she declined to take 4 tablets. Writer offered pt 1- 4oz of orange juice @ 0710. Will recheck in 15 minutes. Pt currently lying in bed. Hob in Semi Fowlers,bed locked in lowest position and call light within reach.

## 2019-05-20 NOTE — PROGRESS NOTES
Problem: Self Care Deficits Care Plan (Adult) Goal: *Acute Goals and Plan of Care (Insert Text) Description Occupational Therapy Goals Goals reviewed and continued: 5/20/19 Initiated 5/13/2019 1. Patient will perform grooming with modified independence standing at the sink within 7 day(s). 2.  Patient will perform upper body dressing and lower body dressing with supervision/set-up within 7 day(s). 3.  Patient will perform simple home management with supervision/set-up within 7 day(s). 4.  Patient will perform toilet transfers with modified independence within 7 day(s). 5.  Patient will perform all aspects of toileting with supervision/set-up within 7 day(s). 6.  Patient will participate in upper extremity therapeutic exercise/activities with supervision/set-up for 10 minutes in standing with < or = 2 rest breaks and supervision within 7 day(s). Outcome: Progressing Towards Goal 
 OCCUPATIONAL THERAPY TREATMENT: WEEKLY REASSESSMENT Patient: Urvashi Urbina (80 y.o. female) Date: 5/20/2019 Diagnosis: Hypoglycemia [E16.2] Hypernatremia [E87.0] Hypoglycemia Precautions: Fall Chart, occupational therapy assessment, plan of care, and goals were reviewed. ASSESSMENT:  
The patient presents with Setup upper body ADLs seated, Moderate Assistance and Total assistance lower body ADLs, and Minimum assistance, Moderate Assistance and Assist x1 assist functional mobility in and out of bed and transfers to chair and BSC with RW. The following are barriers to ADL independence while in acute care:  
- Cognitive and/or behavioral: orientation, insight into deficits and short term memory loss - Medical condition: strength, functional reach, functional endurance, standing balance, medical history, proprioception and coordination   
- Other:  Participation this date impacted by report of \"dizziness\" with positional changes.  BP stable, but elevated (170/77 in sitting) and blood sugar stable (118). Dizziness resolved with extra time. Patient continues to participate toward goals. PLOF: lives with her elderly  who has arthritis, he drives, reports they do all cooking and cleaning but not doing well, reports he hasn't driven recently, reports  drives to get groceries and niece gets them when he can't, reports baby sister checks on them. Patient reports she performs her own self care and assists  with all self care when his arthritis causes him to be in pain. States youngest son used to assist them, but states he passed away 1 year ago. States other 2 sons live in [address] [place] [address]. PLAN: 
Goals have been continued based on progression since last assessment. Ability to participate impacted by need for use of Imelda Hugger on 5/16/19, weakness, and impaired balance. Patient continues to benefit from skilled intervention to address the above impairments. Continue to follow patient 4 times a week to address goals. Recommend with staff: Charis Patino in chair 3 times a day, set up for upper body self care and mod to max assist lower body self care, min to mod assist to move to standing, and use of BSC with RW and assist of 1. Discharge recommendations: Rehab at skilled nursing facility (SNF) (to regain functional baseline patient requires rehab) Barriers to discharging home, in addition to above listed impairments: is caregiver for significant other 
significant other is elderly and unable to assist in patient care 
total assist driving to follow up medical appointment(s)/groceries/obtain medication 
entry and exit into the home, patient will require physical assist from medical transport/ambulance 
level of physical assist required to maintain patient safety. Equipment recommendations for successful discharge (if) home: none SUBJECTIVE:  
Patient stated ? My son passed away a year ago. I miss him so much. ? OBJECTIVE DATA SUMMARY:  
Cognitive/Behavioral Status: Neurologic State: Alert Orientation Level: Oriented to person;Disoriented to time;Disoriented to place(Reoriented using white board) Cognition: Follows commands;Decreased attention/concentration;Memory loss Perception: Cues to maintain midline in standing;Verbal;Tactile Perseveration: No perseveration noted Safety/Judgement: Awareness of environment Functional Mobility and Transfers for ADLs: 
Bed Mobility: 
Supine to Sit: Supervision;Assist x1 Sit to Supine: Supervision;Assist x1 Transfers: 
Sit to Stand: Moderate assistance;Assist x1 Functional Transfers Toilet Transfer : Minimum assistance;Assist x1 Adaptive Equipment: Bedside commode;Walker (comment) Bed to Chair: Minimum assistance;Assist x1 Balance: 
Sitting: Intact Standing: Impaired; With support Standing - Static: Fair Standing - Dynamic : Fair ADL Intervention: 
  
 
  
 
Upper Body Bathing Bathing Assistance: Set-up(in simulation) Position Performed: Seated edge of bed Lower Body Bathing Perineal  : Total assistance (dependent) Position Performed: Standing Adaptive Equipment: Magali Real Lower Body : Moderate assistance(in simulation) Position Performed: Seated edge of bed;Standing Adaptive Equipment: Magali Real Lower Body Dressing Assistance Socks: Maximum assistance(in trial) Leg Crossed Method Used: (partially) Position Performed: Seated edge of bed Cognitive Retraining Orientation Retraining: Reorienting;Time;Place(instructed on use of white board) Organizing/Sequencing: Breaking task down Following Commands: Follows one step commands/directions Safety/Judgement: Awareness of environment Cues: Verbal cues provided; Tactile cues provided;Visual cues provided Activity Tolerance:  
fair Please refer to the flowsheet for vital signs taken during this treatment. After treatment patient left:  
Up in chair Bed alarm/tab alert on Bed/Chair-wheels locked Bed in low position Call light within reach RN notified COMMUNICATION/COLLABORATION:  
The patient?s plan of care was discussed with: Occupational Therapist and Registered Nurse [address] Show Time Calculation: 40 mins

## 2019-05-20 NOTE — PROGRESS NOTES
BS rechecked @ 74. Writer rechecked now 80. Uma Holden, RN made aware. Pt remains at baseline no s/s of distress. A&O intermittently to self and place. Bed, locked and lowered,in lowest position. Call light in reach. Nothing further needed at this time. Oncoming shift notified of these findings.

## 2019-05-20 NOTE — PROGRESS NOTES
Hospitalist Progress Note Idalmis Cartagnea MD 
Answering service: [phone] OR [phone] from in house phone Cell: [phone] Date of Service:  2019 NAME:  Yanira López :  10/11/1932 MRN:  [id number] Admission Summary:  
 
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 The patient is a very poor historian, not much history could be obtained from her, although she is alert, oriented x3, but does not provide much history, reports that she has been having some decreased appetite and has not been feeling well. The patient reports that she has not taken a bath, has not been taking good care of herself since the time she was discharged from the hospital in 10/2018. The patient reports that she has had some chills, decreased appetite and has not been sleeping well. The patient reports that today \"she felt weaker than usual,\" got concerned and decided to come to the hospital 
 
Interval history / Subjective:  
  
 
2019 : 
ciont to improve 
bp now up For placement soon when LTC bed available Assessment & Plan: Hypoglycemia due to poor oral intake 
-finger stick glucose dropped to 54 last night, on  D10 W at 50 ml/hr 
-encourage po intake 
-continue monitor finger stick glucose 
- off iv dextrose today Resolving with endo replacement Hypernatremia due to dehydration due to poor oral intake 
-improved with IVF to D10W at 50 ml hr, Na 137 
-repeat bmp in am resolved f/u lab for 5/15 am  
 
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    TSH in 6 weeks, no need to change adjuste cortef.   
 
Sepsis/sirs; under eval as of early am . - seem resolved, as an acute clinical issues, de escalate abx over next couple of days. Cult neg, can dc all abx soon. Hypothermia  likely 2n2 pan hypo pit status  
-afebrile, no leukocytosis  
-chest x ray no acute process 
-temp was 94.8 5/12 
-resolved, off bear hugger  
  
Hx of HTN 
-BP normal, add norvasc,monitor BP 
BP Readings from Last 1 Encounters:  
05/20/19 170/77  
  
  
syst m, to follow ? Echo. 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 
-outpatient follow up at [address] [place] [address] with neurosurgeon  
  
Chronic severe eczema  Could be assoc with vit deficiency/mal nurishement.  
-continue protective skin cream 
  
Debility 
-PT/OT Hx of recurrent fall  
-PT/OT 
-Fall precaution HTN now endo replaced, showing up,  Starting Norvasc 5/20/2019 Severe protein calorie malnutrition  
-soft diet with nutritional supplement Lab Results Component Value Date/Time Protein, total 6.4 05/16/2019 04:42 AM  
 Albumin 2.1 (L) 05/16/2019 04:42 AM  
 pt is effectively edentulous, she does like supplements. ; nutri consult and for vit eval /replacement empirically.  
  
Full Code; at risk for decompensation, palliative care team consulted 
  
Code status: Full Code DVT prophylaxis: heparin Care Plan discussed with: Patient/Family, Nurse and  Disposition: SNF/Rehab Hospital Problems  Date Reviewed: 5/11/2019 Codes Class Noted POA Panhypopituitarism ([facility] [address] ICD-10-CM: E23.0 ICD-9-CM: 253.2  5/15/2019 Unknown Suprasellar mass ICD-10-CM: R22.0 ICD-9-CM: 784.2  5/15/2019 Unknown Ros; No cp no sob no n/v/d/. Is weak but feeling stronger daily Vital Signs:  
 Last 24hrs VS reviewed since prior progress note. Most recent are: 
Visit Vitals /77 (BP 1 Location: Left arm, BP Patient Position: Sitting) Pulse 69 Temp 99.2 °F (37.3 °C) Resp 18 Ht 4' 11\" (1.499 m) Wt 49.5 kg (109 lb 3.2 oz) SpO2 98% BMI 22.06 kg/m² Intake/Output Summary (Last 24 hours) at 5/20/2019 1426 Last data filed at 5/20/2019 5947 Gross per 24 hour Intake 1231 ml Output  Net 1231 ml Physical Examination:  
 
 
    Stable exam  
Constitutional:  No acute distress, cooperative, pleasant   
ENT:  Oral mucous moist, oropharynx benign. Neck supple, Resp:  CTA bilaterally. No wheezing/rhonchi/rales. No accessory muscle use CV:  Regular rhythm, normal rate, + murmurs, no gallops, GI:  Soft, non distended, non tender. normoactive bowel sounds, no hepatosplenomegaly Musculoskeletal:  No edema, Neurologic:  Moves all extremities. AAOx3, CN II-XII reviewed Skin:  diffuse chronic eczema all over Data Review:  
 Review and/or order of clinical lab test 
 
 
Labs:  
 
Recent Labs  
  05/20/19 
0153 WBC 11.3* HGB 8.0*  
HCT 24.9*  
 Recent Labs  
  05/20/19 
0153 05/19/19 
0211 05/18/19 
6094 * 148* 145  
K 3.3* 3.1* 3.7 * 115* 116* CO2 29 29 26 BUN 11 9 11 CREA 0.46* 0.51* 0.51* GLU 67 62* 61* CA 7.2* 7.1* 7.4* No results for input(s): SGOT, GPT, ALT, AP, TBIL, TBILI, TP, ALB, GLOB, GGT, AML, LPSE in the last 72 hours. No lab exists for component: AMYP, HLPSE No results for input(s): INR, PTP, APTT in the last 72 hours. No lab exists for component: INREXT, INREXT No results for input(s): FE, TIBC, PSAT, FERR in the last 72 hours. Lab Results Component Value Date/Time Folate 16.8 05/17/2019 10:09 AM  
  
No results for input(s): PH, PCO2, PO2 in the last 72 hours. No results for input(s): CPK, CKNDX, TROIQ in the last 72 hours. No lab exists for component: CPKMB No results found for: CHOL, CHOLX, CHLST, CHOLV, HDL, LDL, LDLC, DLDLP, TGLX, TRIGL, TRIGP, CHHD, CHHDX Lab Results Component Value Date/Time  Glucose (POC) 118 (H) 05/20/2019 11:12 AM  
 Glucose (POC) 94 05/20/2019 07:32 AM  
 Glucose (POC) 74 05/20/2019 07:29 AM  
 Glucose (POC) 74 05/20/2019 07:00 AM  
 Glucose (POC) 67 05/20/2019 06:58 AM  
 
Lab Results Component Value Date/Time Color YELLOW/STRAW 05/16/2019 06:17 AM  
 Appearance CLOUDY (A) 05/16/2019 06:17 AM  
 Specific gravity 1.013 05/16/2019 06:17 AM  
 pH (UA) 7.0 05/16/2019 06:17 AM  
 Protein NEGATIVE  05/16/2019 06:17 AM  
 Glucose NEGATIVE  05/16/2019 06:17 AM  
 Ketone NEGATIVE  05/16/2019 06:17 AM  
 Bilirubin NEGATIVE  05/16/2019 06:17 AM  
 Urobilinogen 1.0 05/16/2019 06:17 AM  
 Nitrites NEGATIVE  05/16/2019 06:17 AM  
 Leukocyte Esterase NEGATIVE  05/16/2019 06:17 AM  
 Epithelial cells FEW 05/16/2019 06:17 AM  
 Bacteria 2+ (A) 05/16/2019 06:17 AM  
 WBC 0-4 05/16/2019 06:17 AM  
 RBC 0-5 05/16/2019 06:17 AM  
 
 
 
Medications Reviewed:  
 
Current Facility-Administered Medications Medication Dose Route Frequency  amLODIPine (NORVASC) tablet 5 mg  5 mg Oral DAILY  levothyroxine (SYNTHROID) tablet 75 mcg  75 mcg Oral ACB  nystatin (MYCOSTATIN) 100,000 unit/gram powder   Topical BID  
 balsam peru-castor oil (VENELEX) ointment   Topical BID  
 0.9% sodium chloride infusion  50 mL/hr IntraVENous CONTINUOUS  
 cefepime (MAXIPIME) 2 g in 0.9% sodium chloride (MBP/ADV) 100 mL  2 g IntraVENous M37A  
 folic acid (FOLVITE) tablet 1 mg  1 mg Oral DAILY  thiamine HCL (B-1) tablet 100 mg  100 mg Oral DAILY  ascorbic acid (vitamin C) (VITAMIN C) tablet 500 mg  500 mg Oral DAILY  zinc sulfate (10 mg/mL elemental zinc) oral suspension (44 mg/mL zinc sulfate)  0.5 mg/kg/day Oral DAILY  multivitamin with iron (FLINTSTONES) chewable tablet 1 Tab  1 Tab Oral DAILY  hydrocortisone (CORTEF) tablet 20 mg  20 mg Oral ACB  hydrocortisone (CORTEF) tablet 10 mg  10 mg Oral ACD  pantothenic ac-min oil-pet,hyd (AQUAPHOR) 41 % ointment   Topical BID  
 glucose chewable tablet 16 g  4 Tab Oral PRN  
 dextrose (D50W) injection syrg 12.5-25 g  12.5-25 g IntraVENous PRN  
  glucagon (GLUCAGEN) injection 1 mg  1 mg IntraMUSCular PRN  
 sodium chloride (NS) flush 5-40 mL  5-40 mL IntraVENous Q8H  
 sodium chloride (NS) flush 5-40 mL  5-40 mL IntraVENous PRN  
 acetaminophen (TYLENOL) tablet 650 mg  650 mg Oral Q4H PRN  
 heparin (porcine) injection 5,000 Units  5,000 Units SubCUTAneous Q8H  
 
______________________________________________________________________ EXPECTED LENGTH OF STAY: 3d 7h 
ACTUAL LENGTH OF STAY:          9 Suhas Bartlett MD

## 2019-05-21 LAB
BACTERIA SPEC CULT: ABNORMAL
BACTERIA SPEC CULT: ABNORMAL
GLUCOSE BLD STRIP.AUTO-MCNC: 104 MG/DL (ref 65–100)
GLUCOSE BLD STRIP.AUTO-MCNC: 93 MG/DL (ref 65–100)
GLUCOSE BLD STRIP.AUTO-MCNC: 95 MG/DL (ref 65–100)
SERVICE CMNT-IMP: ABNORMAL
SERVICE CMNT-IMP: ABNORMAL
SERVICE CMNT-IMP: NORMAL
SERVICE CMNT-IMP: NORMAL

## 2019-05-21 PROCEDURE — 74011250636 HC RX REV CODE- 250/636: Performed by: INTERNAL MEDICINE

## 2019-05-21 PROCEDURE — 65270000029 HC RM PRIVATE

## 2019-05-21 PROCEDURE — 97535 SELF CARE MNGMENT TRAINING: CPT

## 2019-05-21 PROCEDURE — 82962 GLUCOSE BLOOD TEST: CPT

## 2019-05-21 PROCEDURE — 97116 GAIT TRAINING THERAPY: CPT

## 2019-05-21 PROCEDURE — 74011250636 HC RX REV CODE- 250/636: Performed by: FAMILY MEDICINE

## 2019-05-21 PROCEDURE — 74011250637 HC RX REV CODE- 250/637: Performed by: INTERNAL MEDICINE

## 2019-05-21 PROCEDURE — 74011250636 HC RX REV CODE- 250/636: Performed by: NURSE PRACTITIONER

## 2019-05-21 PROCEDURE — 74011000258 HC RX REV CODE- 258: Performed by: INTERNAL MEDICINE

## 2019-05-21 RX ORDER — POTASSIUM CHLORIDE 750 MG/1
40 TABLET, FILM COATED, EXTENDED RELEASE ORAL
Status: COMPLETED | OUTPATIENT
Start: 2019-05-21 | End: 2019-05-21

## 2019-05-21 RX ADMIN — HYDROCORTISONE 10 MG: 10 TABLET ORAL at 16:36

## 2019-05-21 RX ADMIN — CEFEPIME 2 G: 2 INJECTION, POWDER, FOR SOLUTION INTRAVENOUS at 10:32

## 2019-05-21 RX ADMIN — Medication 23.7 MG: at 10:44

## 2019-05-21 RX ADMIN — Medication 1 TABLET: at 10:29

## 2019-05-21 RX ADMIN — LEVOTHYROXINE SODIUM 75 MCG: 75 TABLET ORAL at 06:23

## 2019-05-21 RX ADMIN — FOLIC ACID 1 MG: 1 TABLET ORAL at 10:30

## 2019-05-21 RX ADMIN — HEPARIN SODIUM 5000 UNITS: 5000 INJECTION INTRAVENOUS; SUBCUTANEOUS at 10:32

## 2019-05-21 RX ADMIN — NYSTATIN: 100000 POWDER TOPICAL at 19:00

## 2019-05-21 RX ADMIN — Medication 10 ML: at 05:25

## 2019-05-21 RX ADMIN — WHITE PETROLATUM: 1.75 OINTMENT TOPICAL at 10:39

## 2019-05-21 RX ADMIN — HEPARIN SODIUM 5000 UNITS: 5000 INJECTION INTRAVENOUS; SUBCUTANEOUS at 00:39

## 2019-05-21 RX ADMIN — SODIUM CHLORIDE 50 ML/HR: 900 INJECTION, SOLUTION INTRAVENOUS at 22:08

## 2019-05-21 RX ADMIN — CASTOR OIL AND BALSAM, PERU: 788; 87 OINTMENT TOPICAL at 10:30

## 2019-05-21 RX ADMIN — CEFEPIME 2 G: 2 INJECTION, POWDER, FOR SOLUTION INTRAVENOUS at 22:08

## 2019-05-21 RX ADMIN — OXYCODONE HYDROCHLORIDE AND ACETAMINOPHEN 500 MG: 500 TABLET ORAL at 10:30

## 2019-05-21 RX ADMIN — Medication 10 ML: at 22:07

## 2019-05-21 RX ADMIN — HEPARIN SODIUM 5000 UNITS: 5000 INJECTION INTRAVENOUS; SUBCUTANEOUS at 16:36

## 2019-05-21 RX ADMIN — NYSTATIN: 100000 POWDER TOPICAL at 10:39

## 2019-05-21 RX ADMIN — SODIUM CHLORIDE 50 ML/HR: 900 INJECTION, SOLUTION INTRAVENOUS at 14:04

## 2019-05-21 RX ADMIN — Medication 10 ML: at 15:34

## 2019-05-21 RX ADMIN — Medication 100 MG: at 10:29

## 2019-05-21 RX ADMIN — POTASSIUM CHLORIDE 40 MEQ: 750 TABLET, EXTENDED RELEASE ORAL at 15:34

## 2019-05-21 RX ADMIN — AMLODIPINE BESYLATE 5 MG: 5 TABLET ORAL at 10:29

## 2019-05-21 RX ADMIN — WHITE PETROLATUM: 1.75 OINTMENT TOPICAL at 19:01

## 2019-05-21 RX ADMIN — HYDROCORTISONE 20 MG: 10 TABLET ORAL at 06:23

## 2019-05-21 RX ADMIN — CASTOR OIL AND BALSAM, PERU: 788; 87 OINTMENT TOPICAL at 19:00

## 2019-05-21 NOTE — PROGRESS NOTES
LACEY spoke with Dr. Edu Anderson regarding planning for patient d/c tomorrow. CM will inform facility [facility]) of plans.  ANNA Belle, CRM

## 2019-05-21 NOTE — PROGRESS NOTES
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 The patient is a very poor historian, not much history could be obtained from her, although she is alert, oriented x3, but does not provide much history, reports that she has been having some decreased appetite and has not been feeling well. The patient reports that she has not taken a bath, has not been taking good care of herself since the time she was discharged from the hospital in 10/2018. The patient reports that she has had some chills, decreased appetite and has not been sleeping well. The patient reports that today \"she felt weaker than usual,\" got concerned and decided to come to the hospital    Interval history / Subjective:   Patient is seen and examined his afternoon. She has no new complaint. She looks very malnourished. No fever, no abdominal pain, N/V. Culture negative so far. 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      - TSH in 6 weeks,  -  no need to change adjuste cortef.      # Sepsis/SIRS  - under eval as of early am 5/16  - UA/urine culture negative. Blood culture grew coag negative staph 1/4 bottle likely contaminant.   - chest X-ray unremarkable   - no diarrhea or GI symptoms   - seems resolved, as an acute clinical issues, de escalate abx over next couple of days. # Hypothermia  likely 2n2 pan hypo pit status: resolved   -afebrile, no leukocytosis   -chest x ray no acute process  -temp was 94.8 5/12  -resolved, off bear hugger      # Hx of HTN  -BP normal, add norvasc,monitor BP     syst m, to follow ? Echo. Hx of hyperdense mass in the sella/suprasellar space on CT scan  - outpatient follow up at [address] [place] [address] with neurosurgeon      # Chronic severe eczema  Could be assoc with vit deficiency/mal nurishement. - continue protective skin cream     # Debility  -PT/OT  - palliative consult     # Hx of recurrent fall   -PT/OT  -Fall precaution    # Severe protein calorie malnutrition   - soft diet with nutritional supplement   - pt is effectively edentulous, she does like supplements. ; nutri consult and for vit eval /replacement empirically.        Code status: Full Code  DVT prophylaxis: heparin       Care Plan discussed with: Patient/Family, Nurse and   Disposition: Accepted at rehab. Need update and plan to discharge tomorrow      Hospital Problems  Date Reviewed: 5/11/2019          Codes Class Noted POA    Panhypopituitarism ([facility] [address] ICD-10-CM: E23.0  ICD-9-CM: 253.2  5/15/2019 Unknown        Suprasellar mass ICD-10-CM: R22.0  ICD-9-CM: 784.2  5/15/2019 Unknown            Ros; No cp no sob no n/v/d/. Is weak but feeling stronger daily     Vital Signs:    Last 24hrs VS reviewed since prior progress note.  Most recent are:  Visit Vitals  /73 (BP 1 Location: Left arm, BP Patient Position: Standing)   Pulse 71   Temp 99.3 °F (37.4 °C)   Resp 16   Ht 4' 11\" (1.499 m)   Wt 49.5 kg (109 lb 3.2 oz)   SpO2 98%   BMI 22.06 kg/m²         Intake/Output Summary (Last 24 hours) at 5/21/2019 1412  Last data filed at 5/21/2019 0944  Gross per 24 hour   Intake 80 ml   Output    Net 80 ml        Physical Examination:           Stable exam   Constitutional:  No acute distress, cooperative, pleasant    ENT:  Oral mucous moist, oropharynx benign. Neck supple,    Resp:  CTA bilaterally. No wheezing/rhonchi/rales. No accessory muscle use   CV:  Regular rhythm, normal rate, + murmurs, no gallops,     GI:  Soft, non distended, non tender. normoactive bowel sounds, no hepatosplenomegaly     Musculoskeletal:  No edema,    Neurologic:  Moves all extremities. AAOx3, CN II-XII reviewed           Data Review:   I personally reviewed labs and imaging     Labs:     Recent Labs     05/20/19  0153   WBC 11.3*   HGB 8.0*   HCT 24.9*        Recent Labs     05/20/19  0153 05/19/19  0211   * 148*   K 3.3* 3.1*   * 115*   CO2 29 29   BUN 11 9   CREA 0.46* 0.51*   GLU 67 62*   CA 7.2* 7.1*     No results for input(s): SGOT, GPT, ALT, AP, TBIL, TBILI, TP, ALB, GLOB, GGT, AML, LPSE in the last 72 hours. No lab exists for component: AMYP, HLPSE  No results for input(s): INR, PTP, APTT in the last 72 hours. No lab exists for component: INREXT, INREXT   No results for input(s): FE, TIBC, PSAT, FERR in the last 72 hours. Lab Results   Component Value Date/Time    Folate 16.8 05/17/2019 10:09 AM      No results for input(s): PH, PCO2, PO2 in the last 72 hours. No results for input(s): CPK, CKNDX, TROIQ in the last 72 hours.     No lab exists for component: CPKMB  No results found for: CHOL, CHOLX, CHLST, CHOLV, HDL, LDL, LDLC, DLDLP, TGLX, TRIGL, TRIGP, CHHD, CHHDX  Lab Results   Component Value Date/Time    Glucose (POC) 104 (H) 05/21/2019 10:41 AM    Glucose (POC) 95 05/21/2019 06:33 AM    Glucose (POC) 99 05/20/2019 10:00 PM    Glucose (POC) 78 05/20/2019 09:24 PM    Glucose (POC) 96 05/20/2019 06:55 PM     Lab Results   Component Value Date/Time    Color YELLOW/STRAW 05/16/2019 06:17 AM Appearance CLOUDY (A) 05/16/2019 06:17 AM    Specific gravity 1.013 05/16/2019 06:17 AM    pH (UA) 7.0 05/16/2019 06:17 AM    Protein NEGATIVE  05/16/2019 06:17 AM    Glucose NEGATIVE  05/16/2019 06:17 AM    Ketone NEGATIVE  05/16/2019 06:17 AM    Bilirubin NEGATIVE  05/16/2019 06:17 AM    Urobilinogen 1.0 05/16/2019 06:17 AM    Nitrites NEGATIVE  05/16/2019 06:17 AM    Leukocyte Esterase NEGATIVE  05/16/2019 06:17 AM    Epithelial cells FEW 05/16/2019 06:17 AM    Bacteria 2+ (A) 05/16/2019 06:17 AM    WBC 0-4 05/16/2019 06:17 AM    RBC 0-5 05/16/2019 06:17 AM         Medications Reviewed:     Current Facility-Administered Medications   Medication Dose Route Frequency    amLODIPine (NORVASC) tablet 5 mg  5 mg Oral DAILY    levothyroxine (SYNTHROID) tablet 75 mcg  75 mcg Oral ACB    nystatin (MYCOSTATIN) 100,000 unit/gram powder   Topical BID    balsam peru-castor oil (VENELEX) ointment   Topical BID    0.9% sodium chloride infusion  50 mL/hr IntraVENous CONTINUOUS    cefepime (MAXIPIME) 2 g in 0.9% sodium chloride (MBP/ADV) 100 mL  2 g IntraVENous A74C    folic acid (FOLVITE) tablet 1 mg  1 mg Oral DAILY    thiamine HCL (B-1) tablet 100 mg  100 mg Oral DAILY    ascorbic acid (vitamin C) (VITAMIN C) tablet 500 mg  500 mg Oral DAILY    zinc sulfate (10 mg/mL elemental zinc) oral suspension (44 mg/mL zinc sulfate)  0.5 mg/kg/day Oral DAILY    multivitamin with iron (FLINTSTONES) chewable tablet 1 Tab  1 Tab Oral DAILY    hydrocortisone (CORTEF) tablet 20 mg  20 mg Oral ACB    hydrocortisone (CORTEF) tablet 10 mg  10 mg Oral ACD    pantothenic ac-min oil-pet,hyd (AQUAPHOR) 41 % ointment   Topical BID    glucose chewable tablet 16 g  4 Tab Oral PRN    dextrose (D50W) injection syrg 12.5-25 g  12.5-25 g IntraVENous PRN    glucagon (GLUCAGEN) injection 1 mg  1 mg IntraMUSCular PRN    sodium chloride (NS) flush 5-40 mL  5-40 mL IntraVENous Q8H    sodium chloride (NS) flush 5-40 mL  5-40 mL IntraVENous PRN  acetaminophen (TYLENOL) tablet 650 mg  650 mg Oral Q4H PRN    heparin (porcine) injection 5,000 Units  5,000 Units SubCUTAneous Q8H     ______________________________________________________________________  EXPECTED LENGTH OF STAY: 3d 7h  ACTUAL LENGTH OF STAY:          8                 Dain Marquez MD

## 2019-05-21 NOTE — PROGRESS NOTES
Problem: Mobility Impaired (Adult and Pediatric)  Goal: *Acute Goals and Plan of Care (Insert Text)  Description  Physical Therapy Goals   5/15/2019  1. Patient will move from supine to sit and sit to supine , scoot up and down and roll side to side in bed with supervision/set-up within 7 day(s). 2.  Patient will transfer from bed to chair and chair to bed with supervision/set-up using the least restrictive device within 7 day(s). 3.  Patient will perform sit to stand with supervision/set-up within 7 day(s). 4.  Patient will ambulate with supervision/set-up for 200 feet with the least restrictive device within 7 day(s). Outcome: Progressing Towards Goal   PHYSICAL THERAPY TREATMENT  Patient: Abdelrahman Lubin (80 y.o. female)  Date: 5/21/2019  Diagnosis: Hypoglycemia [E16.2]  Hypernatremia [E87.0] Hypoglycemia       Precautions: Fall  Chart, physical therapy assessment, plan of care and goals were reviewed. ASSESSMENT:  Based on the objective data described below, the patient presents with sit to supine min A ans sit to stand CGA. Gait training completed at Minimum assistance, 80 feet and using a gait belt and rolling walker. The following are barriers to independence while in acute care:   -Cognitive and/or behavioral: sequencing, safety awareness, insight into deficits and insight into abilities  -Medical condition: strength, functional endurance, standing balance, cardiopulmonary tolerance and medical history    -Other:       Prior level of function: mod I with rolling walker      PLAN:  Patient continues to benefit from skilled intervention to address the above impairments. Continue treatment per established plan of care.   Recommend with staff: Ajit Mooring with assistance of one  Recommend next PT session: gait tolerance with rolling walker  Discharge recommendations: Rehab at skilled nursing facility (SNF) (to regain functional baseline patient requires rehab)  If above is not an option then recommend: Home health (to increase independence and safety)  24 hour skilled services  24 supervision    Patient's barriers to discharging home, in addition to above impairments: total assist driving to follow up medical appointment(s)/groceries/obtain medication  entry and exit into the home  level of physical assist required to maintain patient safety. Equipment recommendations for successful discharge (if) home:        SUBJECTIVE:   Patient stated ?it is so cold. ?    OBJECTIVE DATA SUMMARY:   Critical Behavior:  Neurologic State: Alert  Orientation Level: Oriented to person, Oriented to place(month and day of week)  Cognition: Follows commands, Decreased attention/concentration, Memory loss  Safety/Judgement: Awareness of environment  Functional Mobility Training:  Bed Mobility:        Sit to Supine: Minimum assistance           Transfers:  Sit to Stand: Contact guard assistance  Stand to Sit: Contact guard assistance                             Balance:  Sitting: Intact  Standing: Impaired; With support  Standing - Static: Fair  Standing - Dynamic : Fair  Ambulation/Gait Training:  Distance (ft): 80 Feet (ft)  Assistive Device: Gait belt;Walker, rolling  Ambulation - Level of Assistance: Minimal assistance        Gait Abnormalities: Decreased step clearance        Base of Support: Center of gravity altered        Step Length: Left shortened;Right shortened                   Stairs: Therapeutic Exercises:     Pain:  No complaints    Activity Tolerance:   Limited   Please refer to the flowsheet for vital signs taken during this treatment.     After treatment patient left:   Supine in bed  Call light within reach  RN notified     COMMUNICATION/COLLABORATION:   The patient?s plan of care was discussed with: Certified Occupational Therapy Assistant and Registered Nurse    Lianne Hill PTA   Time Calculation: 14 mins

## 2019-05-21 NOTE — PROGRESS NOTES
Problem: Self Care Deficits Care Plan (Adult)  Goal: *Acute Goals and Plan of Care (Insert Text)  Description  Occupational Therapy Goals  Goals reviewed and continued: 5/20/19  Initiated 5/13/2019  1. Patient will perform grooming with modified independence standing at the sink within 7 day(s). 2.  Patient will perform upper body dressing and lower body dressing with supervision/set-up within 7 day(s). 3.  Patient will perform simple home management with supervision/set-up within 7 day(s). 4.  Patient will perform toilet transfers with modified independence within 7 day(s). 5.  Patient will perform all aspects of toileting with supervision/set-up within 7 day(s). 6.  Patient will participate in upper extremity therapeutic exercise/activities with supervision/set-up for 10 minutes in standing with < or = 2 rest breaks and supervision within 7 day(s). Outcome: Progressing Towards Goal   OCCUPATIONAL THERAPY TREATMENT  Patient: Krista Tapia (80 y.o. female)  Date: 5/21/2019  Diagnosis: Hypoglycemia [E16.2]  Hypernatremia [E87.0] Hypoglycemia       Precautions: Fall  Chart, occupational therapy assessment, plan of care, and goals were reviewed. ASSESSMENT:   The patient presents with Setup upper body ADLs, Maximum assistance lower body ADLs, and Minimum assistance and Assist x1 assist functional mobility. The following are barriers to ADL independence while in acute care:   - Cognitive and/or behavioral: processing and short term memory loss  - Medical condition: functional reach, functional endurance, standing balance, medical history and coordination    - Other:  No report of dizziness with positional changes today as reported on 5/20/19. Demonstrating increased strength and mobility with less assist this date. BP elevated, but stable.     Prior level of function:  lives with her elderly  who has arthritis, he drives, reports they do all cooking and cleaning but not doing well, reports he hasn't driven recently, reports  drives to get groceries and niece gets them when he can't, reports baby sister checks on them. Patient reports she performs her own self care and assists  with all self care when his arthritis causes him to be in pain. States youngest son used to assist them, but states he passed away 1 year ago. States other 2 sons live in [address] [place] [address]. PLAN:  Patient continues to benefit from skilled intervention to address the above impairments. Continue treatment per established plan of care. Recommend with staff: BSC with min of 1 and RW for transfer, OOB in chair 3 times a day, slef care with assist for LEs  Recommend next OT session: Progression to toilet for toileting with RW  Discharge recommendations: Rehab at skilled nursing facility (SNF) (to regain functional baseline patient requires rehab)  If above is not an option then recommend: Home health (to increase independence and safety)  24 supervision  physical assist during all functional mobility    Barriers to discharging home, in addition to above listed impairments: is caregiver for significant other  significant other is elderly and unable to assist in patient care  total assist driving to follow up medical appointment(s)/groceries/obtain medication  entry and exit into the home, patient will require physical assist from medical transport/ambulance  level of physical assist required to maintain patient safety. Equipment recommendations for successful discharge (if) home: none      SUBJECTIVE:   Patient stated ? I feel better today. ?    OBJECTIVE DATA SUMMARY:   Cognitive/Behavioral Status:  Neurologic State: Alert  Orientation Level: Oriented to person;Oriented to place(month and day of week)  Cognition: Follows commands;Decreased attention/concentration;Memory loss  Perception: Appears intact  Perseveration: No perseveration noted  Safety/Judgement: Awareness of environment    Functional Mobility and Transfers for ADLs:  Bed Mobility:       Transfers:     Functional Transfers  Toilet Transfer : Minimum assistance;Assist x1(inferred from transfer to chair with RW)  Adaptive Equipment: Bedside commode;Walker (comment)   Stood for 5 min while using on hand on RW and one hand for functional activity on bed side tray    Balance:  Sitting: Intact  Standing: Impaired; With support  Standing - Static: Fair  Standing - Dynamic : Fair    ADL Intervention:  Feeding  Container Management: Minimum assistance  Cutting Food: Minimum assistance  Food to Mouth: Modified independent  Drink to Mouth: Modified independent          Cognitive Retraining  Orientation Retraining: Reorienting;Time  Following Commands: Follows one step commands/directions  Safety/Judgement: Awareness of environment  Cues: Verbal cues provided; Tactile cues provided    Therapeutic Exercises:   Dynamic standing: Marching in place for 10 reps, reaching to each knee and reaching overhead individually with one hand while using RW for support with opposite hand for 5 reps ech. CGA to complete. Activity Tolerance:   Fair  Please refer to the flowsheet for vital signs taken during this treatment.     After treatment patient left:   Up in chair  Bed alarm/tab alert on  Call light within reach  RN notified     COMMUNICATION/COLLABORATION:   The patient?s plan of care was discussed with: Registered Nurse    ESTER Keane  Time Calculation: 33 mins

## 2019-05-21 NOTE — PROGRESS NOTES
Problem: Falls - Risk of  Goal: *Absence of Falls  Description  Document Clide Failing Fall Risk and appropriate interventions in the flowsheet. Outcome: Progressing Towards Goal     Problem: Pressure Injury - Risk of  Goal: *Prevention of pressure injury  Description  Document Bon Scale and appropriate interventions in the flowsheet.   Outcome: Progressing Towards Goal  Note:   Pressure Injury Interventions:  Sensory Interventions: Assess changes in LOC    Moisture Interventions: Absorbent underpads    Activity Interventions: Increase time out of bed    Mobility Interventions: Float heels    Nutrition Interventions: Document food/fluid/supplement intake    Friction and Shear Interventions: Apply protective barrier, creams and emollients

## 2019-05-22 VITALS
RESPIRATION RATE: 18 BRPM | OXYGEN SATURATION: 97 % | BODY MASS INDEX: 22.01 KG/M2 | SYSTOLIC BLOOD PRESSURE: 155 MMHG | HEIGHT: 59 IN | TEMPERATURE: 97.8 F | HEART RATE: 73 BPM | WEIGHT: 109.2 LBS | DIASTOLIC BLOOD PRESSURE: 77 MMHG

## 2019-05-22 LAB
ANION GAP SERPL CALC-SCNC: 1 MMOL/L (ref 5–15)
BUN SERPL-MCNC: 8 MG/DL (ref 6–20)
BUN/CREAT SERPL: 18 (ref 12–20)
CALCIUM SERPL-MCNC: 7.5 MG/DL (ref 8.5–10.1)
CHLORIDE SERPL-SCNC: 113 MMOL/L (ref 97–108)
CO2 SERPL-SCNC: 31 MMOL/L (ref 21–32)
CREAT SERPL-MCNC: 0.45 MG/DL (ref 0.55–1.02)
ERYTHROCYTE [DISTWIDTH] IN BLOOD BY AUTOMATED COUNT: 19 % (ref 11.5–14.5)
GLUCOSE BLD STRIP.AUTO-MCNC: 110 MG/DL (ref 65–100)
GLUCOSE BLD STRIP.AUTO-MCNC: 71 MG/DL (ref 65–100)
GLUCOSE BLD STRIP.AUTO-MCNC: 93 MG/DL (ref 65–100)
GLUCOSE SERPL-MCNC: 67 MG/DL (ref 65–100)
HCT VFR BLD AUTO: 26.3 % (ref 35–47)
HGB BLD-MCNC: 8.3 G/DL (ref 11.5–16)
MCH RBC QN AUTO: 26.6 PG (ref 26–34)
MCHC RBC AUTO-ENTMCNC: 31.6 G/DL (ref 30–36.5)
MCV RBC AUTO: 84.3 FL (ref 80–99)
NRBC # BLD: 0 K/UL (ref 0–0.01)
NRBC BLD-RTO: 0 PER 100 WBC
PLATELET # BLD AUTO: 197 K/UL (ref 150–400)
PMV BLD AUTO: 11.4 FL (ref 8.9–12.9)
POTASSIUM SERPL-SCNC: 3.3 MMOL/L (ref 3.5–5.1)
RBC # BLD AUTO: 3.12 M/UL (ref 3.8–5.2)
SERVICE CMNT-IMP: ABNORMAL
SERVICE CMNT-IMP: NORMAL
SERVICE CMNT-IMP: NORMAL
SODIUM SERPL-SCNC: 145 MMOL/L (ref 136–145)
WBC # BLD AUTO: 12.8 K/UL (ref 3.6–11)

## 2019-05-22 PROCEDURE — 82962 GLUCOSE BLOOD TEST: CPT

## 2019-05-22 PROCEDURE — 74011000258 HC RX REV CODE- 258: Performed by: INTERNAL MEDICINE

## 2019-05-22 PROCEDURE — 36415 COLL VENOUS BLD VENIPUNCTURE: CPT

## 2019-05-22 PROCEDURE — 97116 GAIT TRAINING THERAPY: CPT

## 2019-05-22 PROCEDURE — 74011250637 HC RX REV CODE- 250/637: Performed by: INTERNAL MEDICINE

## 2019-05-22 PROCEDURE — 97530 THERAPEUTIC ACTIVITIES: CPT

## 2019-05-22 PROCEDURE — 94760 N-INVAS EAR/PLS OXIMETRY 1: CPT

## 2019-05-22 PROCEDURE — 74011250636 HC RX REV CODE- 250/636: Performed by: INTERNAL MEDICINE

## 2019-05-22 PROCEDURE — 85027 COMPLETE CBC AUTOMATED: CPT

## 2019-05-22 PROCEDURE — 74011250636 HC RX REV CODE- 250/636: Performed by: FAMILY MEDICINE

## 2019-05-22 PROCEDURE — 80048 BASIC METABOLIC PNL TOTAL CA: CPT

## 2019-05-22 RX ORDER — FOLIC ACID 1 MG/1
1 TABLET ORAL DAILY
Qty: 60 TAB | Refills: 1 | Status: SHIPPED | OUTPATIENT
Start: 2019-05-23

## 2019-05-22 RX ORDER — ASCORBIC ACID 500 MG
500 TABLET ORAL DAILY
Qty: 60 TAB | Refills: 1 | Status: SHIPPED | OUTPATIENT
Start: 2019-05-23

## 2019-05-22 RX ORDER — AMLODIPINE BESYLATE 5 MG/1
5 TABLET ORAL DAILY
Qty: 60 TAB | Refills: 1 | Status: SHIPPED | OUTPATIENT
Start: 2019-05-23

## 2019-05-22 RX ORDER — BALSAM PERU/CASTOR OIL
OINTMENT (GRAM) TOPICAL 2 TIMES DAILY
Qty: 1 TUBE | Refills: 0 | Status: SHIPPED | OUTPATIENT
Start: 2019-05-22

## 2019-05-22 RX ORDER — HYDROCORTISONE 10 MG/1
TABLET ORAL
Qty: 90 TAB | Refills: 2 | Status: SHIPPED | OUTPATIENT
Start: 2019-05-22

## 2019-05-22 RX ORDER — LEVOTHYROXINE SODIUM 75 UG/1
75 TABLET ORAL
Qty: 60 TAB | Refills: 1 | Status: SHIPPED | OUTPATIENT
Start: 2019-05-23

## 2019-05-22 RX ADMIN — Medication 10 ML: at 06:50

## 2019-05-22 RX ADMIN — CASTOR OIL AND BALSAM, PERU: 788; 87 OINTMENT TOPICAL at 09:02

## 2019-05-22 RX ADMIN — FOLIC ACID 1 MG: 1 TABLET ORAL at 08:57

## 2019-05-22 RX ADMIN — NYSTATIN: 100000 POWDER TOPICAL at 09:02

## 2019-05-22 RX ADMIN — AMLODIPINE BESYLATE 5 MG: 5 TABLET ORAL at 08:57

## 2019-05-22 RX ADMIN — CEFEPIME 2 G: 2 INJECTION, POWDER, FOR SOLUTION INTRAVENOUS at 09:03

## 2019-05-22 RX ADMIN — LEVOTHYROXINE SODIUM 75 MCG: 75 TABLET ORAL at 06:50

## 2019-05-22 RX ADMIN — OXYCODONE HYDROCHLORIDE AND ACETAMINOPHEN 500 MG: 500 TABLET ORAL at 08:57

## 2019-05-22 RX ADMIN — HEPARIN SODIUM 5000 UNITS: 5000 INJECTION INTRAVENOUS; SUBCUTANEOUS at 08:57

## 2019-05-22 RX ADMIN — Medication 100 MG: at 08:57

## 2019-05-22 RX ADMIN — WHITE PETROLATUM: 1.75 OINTMENT TOPICAL at 09:02

## 2019-05-22 RX ADMIN — Medication 23.7 MG: at 09:07

## 2019-05-22 RX ADMIN — HYDROCORTISONE 20 MG: 10 TABLET ORAL at 06:50

## 2019-05-22 RX ADMIN — Medication 1 TABLET: at 08:57

## 2019-05-22 RX ADMIN — HEPARIN SODIUM 5000 UNITS: 5000 INJECTION INTRAVENOUS; SUBCUTANEOUS at 01:54

## 2019-05-22 NOTE — PROGRESS NOTES
Problem: Mobility Impaired (Adult and Pediatric)  Goal: *Acute Goals and Plan of Care (Insert Text)  Description  Physical Therapy Goals  Reviewed and reassessed 5/2/19 and remain appropriate   5/15/2019  1. Patient will move from supine to sit and sit to supine , scoot up and down and roll side to side in bed with supervision/set-up within 7 day(s). 2.  Patient will transfer from bed to chair and chair to bed with supervision/set-up using the least restrictive device within 7 day(s). 3.  Patient will perform sit to stand with supervision/set-up within 7 day(s). 4.  Patient will ambulate with supervision/set-up for 200 feet with the least restrictive device within 7 day(s). Outcome: Progressing Towards Goal  PHYSICAL THERAPY TREATMENT: WEEKLY REASSESSMENT  Patient: Ethel Maddox (80 y.o. female)  Date: 5/22/2019  Diagnosis: Hypoglycemia [E16.2]  Hypernatremia [E87.0] Hypoglycemia       Precautions: Fall  Chart, physical therapy assessment, plan of care and goals were reviewed. ASSESSMENT:  Pt received supine in bed and agreeable to therapy. Pt tolerated session fairly well. Pt required min A for bed mobility and transfers with min A and RW. Pt ambulated with RW with min A at times due to generalized weakness and instability. Pt able to get on and off the [facility] with min A and additional time to assume standing position. Pt remained seated in chair with all needs met. Recommend discharge to SNF as pt is functioning below baseline mobility status and remains at risk for falls. Patient's progression toward goals since last assessment: progress has been made towards     PLAN:  Goals have been updated based on progression since last assessment. Patient continues to benefit from skilled intervention to address the above impairments. Continue to follow the patient 5 times a week to address goals. Planned Interventions:  ?              Bed Mobility Training             ? Neuromuscular Re-Education  ? Transfer Training                   ? Orthotic/Prosthetic Training  ? Gait Training                         ? Modalities  ? Therapeutic Exercises           ? Edema Management/Control  ? Therapeutic Activities            ? Patient and Family Training/Education  ? Other (comment):  Discharge Recommendations: Skilled Nursing Facility  Further Equipment Recommendations for Discharge: tbd      SUBJECTIVE:   Patient stated ? I'm feeling sleepy this morning. I am ready for a nap. ?    OBJECTIVE DATA SUMMARY:   Critical Behavior:  Neurologic State: Alert  Orientation Level: Oriented to person, Oriented to place, Disoriented to time, Disoriented to situation  Cognition: Follows commands, Decreased attention/concentration, Memory loss  Safety/Judgement: Awareness of environment    Strength:   Strength: Generally decreased, functional                      Functional Mobility Training:  Bed Mobility:     Supine to Sit: Minimum assistance              Transfers:  Sit to Stand: Minimum assistance  Stand to Sit: Minimum assistance                             Balance:  Sitting: Intact  Standing: Impaired; With support  Standing - Static: Fair  Standing - Dynamic : Fair  Ambulation/Gait Training:  Distance (ft): 50 Feet (ft)  Assistive Device: Gait belt;Walker, rolling  Ambulation - Level of Assistance: Minimal assistance        Gait Abnormalities: Decreased step clearance;Shuffling gait        Base of Support: Narrowed     Speed/Malorie: Pace decreased (<100 feet/min)  Step Length: Right shortened;Left shortened                    Stairs:           Neuro Re-Education:    Therapeutic Exercises:     Pain:  Pain Scale 1: Numeric (0 - 10)  Pain Intensity 1: 0              Activity Tolerance:   Good. VSS  Please refer to the flowsheet for vital signs taken during this treatment.   After treatment:   ?  Patient left in no apparent distress sitting up in chair  ? Patient left in no apparent distress in bed  ? Call bell left within reach  ? Nursing notified  ? Caregiver present  ?   Bed alarm activated    COMMUNICATION/COLLABORATION:   The patient?s plan of care was discussed with: Occupational Therapist and Registered Nurse    Kori Tsang PT, DPT   Time Calculation: 23 mins

## 2019-05-22 NOTE — DISCHARGE SUMMARY
Discharge Summary       PATIENT ID: Urvashi Urbina  MRN: [id number]   YOB: 1932    DATE OF ADMISSION: 5/10/2019  4:04 PM    DATE OF DISCHARGE: 05/22/19  PRIMARY CARE PROVIDER: Thong Cisneros MD       DISCHARGING PROVIDER: Minal Hernandez MD    To contact this individual call [phone] and ask the  to page. If unavailable ask to be transferred the Adult Hospitalist Department. CONSULTATIONS: IP CONSULT TO HOSPITALIST      PROCEDURES/SURGERIES: * No surgery found *    IMAGING  Xr Pelv Ap Only    Result Date: 5/10/2019  IMPRESSION: No evidence of fracture or dislocation. Xr Tib/fib Rt    Result Date: 5/10/2019  IMPRESSION: No acute abnormality. Ct Head Wo Cont    Result Date: 5/10/2019  IMPRESSION: 1. No evidence of acute intracranial abnormality by this modality. 2. Redemonstrated sellar/suprasellar mass. Ct Spine Cerv Wo Cont    Result Date: 5/10/2019  IMPRESSION: No acute fracture. Grade 1 anterolisthesis at C3-4 secondary to severe bilateral facet arthropathy. Multilevel degenerative changes. Xr Chest Port    Result Date: 5/16/2019  IMPRESSION: No airspace disease or other acute abnormality. No change. Xr Chest Port    Result Date: 5/10/2019  IMPRESSION: 1. No radiographic evidence of acute cardiopulmonary disease. Xr Chest Port    Result Date: 5/10/2019  Impression: No acute process.            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      - Re-check TSH in 6 weeks,  -  no need to change cortef.      # Sepsis/SIRS  - under eval as of early am 5/16  - UA/urine culture negative. Blood culture grew coag negative staph 1/4 bottle likely contaminant and no growth in other bottles   - chest X-ray unremarkable   - no diarrhea or GI symptoms   - seems resolved, as an acute clinical issues, de escalate abx over next couple of days. 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 with neurosurgeon      # Chronic severe eczema  Could be assoc with vit deficiency/mal nurishement.    - continue protective skin cream  - vit replacement      # Debility  - PT/OT  - palliative consult      # Hx of recurrent fall   - PT/OT  - Fall precaution     # Severe protein calorie malnutrition   - soft diet with nutritional supplement   - pt is effectively edentulous, she does like supplements. ; nutri consult and for vit eval /replacement empirically.            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  [address]  [address]  [place]    Edy Lemus MD [facility] Schedule an appointment as soon as possible for a visit in 1 week post hosptial discharge follow up  [address] [facility] [address] [facility] [address] [facility]  Schedule an appointment as soon as possible for a visit in 1 week For TSH test and follow up for [address] [address]  [phone]           ADDITIONAL CARE RECOMMENDATIONS:None     DIET: High calorie-protien diet dental soft diet     ACTIVITY: as tolerated     WOUND CARE: None     EQUIPMENT needed: None       DISCHARGE MEDICATIONS:  Current Discharge Medication List      START taking these medications    Details   amLODIPine (NORVASC) 5 mg tablet Take 1 Tab by mouth daily. Qty: 60 Tab, Refills: 1      ascorbic acid, vitamin C, (VITAMIN C) 500 mg tablet Take 1 Tab by mouth daily. Qty: 60 Tab, Refills: 1      folic acid (FOLVITE) 1 mg tablet Take 1 Tab by mouth daily. Qty: 60 Tab, Refills: 1      hydrocortisone (CORTEF) 10 mg tablet Take 2 Tabs by mouth every morning AND 1 Tab daily (with dinner). Qty: 90 Tab, Refills: 2      levothyroxine (SYNTHROID) 75 mcg tablet Take 1 Tab by mouth Daily (before breakfast).   Qty: 60 Tab, Refills: 1      multivitamin with iron (FLINTSTONES) chewable tablet Take 1 Tab by mouth daily.  Qty: 60 Tab, Refills: 1      zinc sulfate 66 mg tab Take 66 mg by mouth daily. Qty: 30 Tab, Refills: 1             All Micro Results     Procedure Component Value Units Date/Time    CULTURE, BLOOD, PAIRED [[id number]]  (Abnormal) Collected:  05/16/19 0851    Order Status:  Completed Specimen:  Blood Updated:  05/21/19 1233     Special Requests: NO SPECIAL REQUESTS        Culture result:       STAPHYLOCOCCUS SPECIES, COAGULASE NEGATIVE , ISOLATED FROM 1 OF 4 BOTTLES DRAWN. ..L.WRIST SITE                  REMAINING BOTTLE(S) HAS/HAVE NO GROWTH IN 5 DAYS          CULTURE, URINE [[id number]] Collected:  05/16/19 0617    Order Status:  Completed Specimen:  Urine Updated:  05/17/19 0936     Special Requests: --        NO SPECIAL REQUESTS  Reflexed from [id number]       Orange Count --        [id number]  COLONIES/mL       Culture result:       MIXED UROGENITAL HCRISTI ISOLATED                Recent Results (from the past 24 hour(s))   GLUCOSE, POC    Collection Time: 05/21/19  3:34 PM   Result Value Ref Range    Glucose (POC) 93 65 - 100 mg/dL    Performed by Edgardo Choi    CBC W/O DIFF    Collection Time: 05/22/19  3:44 AM   Result Value Ref Range    WBC 12.8 (H) 3.6 - 11.0 K/uL    RBC 3.12 (L) 3.80 - 5.20 M/uL    HGB 8.3 (L) 11.5 - 16.0 g/dL    HCT 26.3 (L) 35.0 - 47.0 %    MCV 84.3 80.0 - 99.0 FL    MCH 26.6 26.0 - 34.0 PG    MCHC 31.6 30.0 - 36.5 g/dL    RDW 19.0 (H) 11.5 - 14.5 %    PLATELET 240 674 - 167 K/uL    MPV 11.4 8.9 - 12.9 FL    NRBC 0.0 0  WBC    ABSOLUTE NRBC 0.00 0.00 - 4.42 K/uL   METABOLIC PANEL, BASIC    Collection Time: 05/22/19  3:44 AM   Result Value Ref Range    Sodium 145 136 - 145 mmol/L    Potassium 3.3 (L) 3.5 - 5.1 mmol/L    Chloride 113 (H) 97 - 108 mmol/L    CO2 31 21 - 32 mmol/L    Anion gap 1 (L) 5 - 15 mmol/L    Glucose 67 65 - 100 mg/dL    BUN 8 6 - 20 MG/DL    Creatinine 0.45 (L) 0.55 - 1.02 MG/DL    BUN/Creatinine ratio 18 12 - 20      GFR est AA >60 >60 ml/min/1.73m2    GFR est non-AA >60 >60 ml/min/1.73m2    Calcium 7.5 (L) 8.5 - 10.1 MG/DL   GLUCOSE, POC    Collection Time: 05/22/19  6:30 AM   Result Value Ref Range    Glucose (POC) 71 65 - 100 mg/dL    Performed by [address], POC    Collection Time: 05/22/19  8:10 AM   Result Value Ref Range    Glucose (POC) 110 (H) 65 - 100 mg/dL    Performed by Saniya HANCOCK            NOTIFY YOUR PHYSICIAN FOR ANY OF THE FOLLOWING:   Fever over 101 degrees for 24 hours. Chest pain, shortness of breath, fever, chills, nausea, vomiting, diarrhea, change in mentation, falling, weakness, bleeding. Severe pain or pain not relieved by medications. Or, any other signs or symptoms that you may have questions about. DISPOSITION:    Home With:   OT  PT  HH  RN      x Long term SNF/Inpatient Rehab    Independent/assisted living    Hospice    Other:       PATIENT CONDITION AT DISCHARGE:     Functional status    Poor    x Deconditioned     Independent      Cognition     Lucid     Forgetful     Dementia      Catheters/lines (plus indication)    [place]     PICC     PEG    x None      Code status    x Full code     DNR      PHYSICAL EXAMINATION AT DISCHARGE:    Constitutional:  No acute distress, cooperative, pleasant, but chronically malnourished     ENT:  Oral mucous moist, oropharynx benign. Neck supple, no teeth    Resp:  CTA bilaterally. No wheezing/rhonchi/rales. No accessory muscle use   CV:  Regular rhythm, normal rate, + murmurs, no gallops,     GI:  Soft, non distended, non tender. normoactive bowel sounds, no hepatosplenomegaly     Musculoskeletal:  No edema,    Neurologic:  Moves all extremities. AAOx3, CN II-XII reviewed  SKIN: very dry, dark, thick and leathery skin on the upper and lower ext.             CHRONIC MEDICAL DIAGNOSES:  Problem List as of 5/22/2019 Date Reviewed: 5/11/2019          Codes Class Noted - Resolved    Panhypopituitarism ([facility] ICD-10-CM: E23.0  ICD-9-CM: 253.2  5/15/2019 - Present Suprasellar mass ICD-10-CM: R22.0  ICD-9-CM: 784.2  5/15/2019 - Present        Generalized weakness ICD-10-CM: R53.1  ICD-9-CM: 780.79  10/26/2018 - Present        Hypokalemia ICD-10-CM: E87.6  ICD-9-CM: 276.8  10/26/2018 - Present        SIRS (systemic inflammatory response syndrome) (HCC) ICD-10-CM: R65.10  ICD-9-CM: 995.90  10/26/2018 - Present        Hypertension (Chronic) ICD-10-CM: I10  ICD-9-CM: 401.9  10/26/2018 - Present        Arthritis (Chronic) ICD-10-CM: M19.90  ICD-9-CM: 716.90  10/26/2018 - Present        Weakness ICD-10-CM: R53.1  ICD-9-CM: 780.79  7/28/2016 - Present        RESOLVED: Hypernatremia ICD-10-CM: E87.0  ICD-9-CM: 276.0  5/11/2019 - 5/14/2019        * (Principal) RESOLVED: Hypoglycemia ICD-10-CM: E16.2  ICD-9-CM: 251.2  10/26/2018 - 5/14/2019                Discussed with patient and family. Explained the importance of following up, Compliance with medications and recommendations on discharge,Side effect profile of medications were explained. Safety precautions at home and while taking pain medications also explained. All questions answered to the satisfaction of the patient/family. Discussed with consultant(s) who are agreeable to the discharge. Verbal and written instructions on discharge given. Explained about Discharge medications and side effect profile. Advised patient/family to followup with their pcp for medication refills and preauthorization of medications, Home health orders. checkups,screenign programs as appropriate for age.        Thank you Lety Herrera MD for taking care of your patient, Please call with any questions.       Greater than 35 minutes were spent with the patient on counseling and coordination of care    Signed:   Theodore Ramachandran MD  5/22/2019  10:52 AM

## 2019-05-22 NOTE — PROGRESS NOTES
Report was called to [facility] court with SBAR given to [facility]. 9 Prescriptions were given to the patient. No distress noted.

## 2019-05-22 NOTE — DISCHARGE INSTRUCTIONS
Patient Education        Fatigue: Care Instructions  Your Care Instructions    Fatigue is a feeling of tiredness, exhaustion, or lack of energy. You may feel fatigue because of too much or not enough activity. It can also come from stress, lack of sleep, boredom, and poor diet. Many medical problems, such as viral infections, can cause fatigue. Emotional problems, especially depression, are often the cause of fatigue. Fatigue is most often a symptom of another problem. Treatment for fatigue depends on the cause. For example, if you have fatigue because you have a certain health problem, treating this problem also treats your fatigue. If depression or anxiety is the cause, treatment may help. Follow-up care is a key part of your treatment and safety. Be sure to make and go to all appointments, and call your doctor if you are having problems. It's also a good idea to know your test results and keep a list of the medicines you take. How can you care for yourself at home? · Get regular exercise. But don't overdo it. Go back and forth between rest and exercise. · Get plenty of rest.  · Eat a healthy diet. Do not skip meals, especially breakfast.  · Reduce your use of caffeine, tobacco, and alcohol. Caffeine is most often found in coffee, tea, cola drinks, and chocolate. · Limit medicines that can cause fatigue. This includes tranquilizers and cold and allergy medicines. When should you call for help? Watch closely for changes in your health, and be sure to contact your doctor if:    · You have new symptoms such as fever or a rash.     · Your fatigue gets worse.     · You have been feeling down, depressed, or hopeless. Or you may have lost interest in things that you usually enjoy.     · You are not getting better as expected. Where can you learn more? Go to http://miguel-harmony.info/. Enter N944 in the search box to learn more about \"Fatigue: Care Instructions. \"  Current as of: September 23, 2018  Content Version: 11.9  © 7440-0332 [employer]. Care instructions adapted under license by [employer] (which disclaims liability or warranty for this information). If you have questions about a medical condition or this instruction, always ask your healthcare professional. [address] any warranty or liability for your use of this information. Hypoglycemia: Care Instructions  Your Care Instructions    Hypoglycemia means that your blood sugar is low and your body is not getting enough fuel. Some people get low blood sugar from not eating often enough. Some medicines to treat diabetes can cause low blood sugar. People who have had surgery on their stomachs or intestines may get hypoglycemia. Problems with the pancreas, kidneys, or liver also can cause low blood sugar. A snack or drink with sugar in it will raise your blood sugar and should ease your symptoms right away. Your doctor may recommend that you change or stop your medicines until you can get your blood sugar levels under control. In the long run, you may need to change your diet and eating habits so that you get enough fuel for your body throughout the day. Follow-up care is a key part of your treatment and safety. Be sure to make and go to all appointments, and call your doctor if you are having problems. It's also a good idea to know your test results and keep a list of the medicines you take. How can you care for yourself at home? · Learn to recognize the early signs of low blood sugar. Signs include:  ? Nausea. ? Hunger. ? Feeling nervous, irritable, or shaky. ? Cold, clammy, wet skin. ? Sweating (when you are not exercising). ? A fast heartbeat.  ? Numbness or tingling of the fingertips or lips. · If you feel an episode of low blood sugar coming on, drink fruit juice or sugared (not diet) soda, or eat sugar in the form of candy, cubes, or tablets.  Raisins are another quick-sugar food. · Eat small, frequent meals so that you do not get too hungry between meals. · Balance extra exercise with eating more. · Keep a written record of your low blood sugar episodes, including when you last ate and what you ate, so that you can learn what causes your blood sugar to drop. · Make sure your family, friends, and coworkers know the symptoms of low blood sugar and know what to do to get your sugar level up. · Wear medical alert jewelry that lists your condition. You can buy this at most drugstores. When should you call for help? Call 911 anytime you think you may need emergency care. For example, call if:    · You passed out (lost consciousness).     · You are confused or cannot think clearly.     · Your blood sugar is very high or very low.    Watch closely for changes in your health, and be sure to contact your doctor if:    · Your blood sugar stays outside the level your doctor set for you.     · You have any problems. Where can you learn more? Go to http://miguel-harmony.info/. Enter I362 in the search box to learn more about \"Hypoglycemia: Care Instructions. \"  Current as of: July 25, 2018  Content Version: 11.9  © 3578-9445 [employer]. Care instructions adapted under license by [employer] (which disclaims liability or warranty for this information). If you have questions about a medical condition or this instruction, always ask your healthcare professional. [place] [address] any warranty or liability for your use of this information.

## 2019-05-22 NOTE — PROGRESS NOTES
Patient approved for admission to [facility] today. Orders for patient d/c noted. Patient to be admitted to the [facility] room 110B. Assigned nurse to call report to Ezzard Cranker [phone]. CM requested ambulance transport for 1pm today. CM awaiting confirmation. Packet left on Hard Chart. ADA Palacios, CRM    11:45a   Ambulance  Transport confirmed for 3pm today with [facility]. CM spoke with patient's niece (Ms [phone] ) to advise of d/c to [facility] today. She was informed of ambulance transport for 3pm today. Efforts of reach patient's spouse were unsuccessful. Ms Novak Isabel will meet patient at the facility this afternoon.   Ada Palacios, CRM

## 2019-06-01 ENCOUNTER — TELEPHONE (OUTPATIENT)
Dept: FAMILY MEDICINE CLINIC | Age: 84
End: 2019-06-01

## 2020-01-26 NOTE — ED TRIAGE NOTES
Arrived from home reporting GLF 2 hours ago. Patient was sleeping on a chair and slid off. Reporting lower back pain. Patient crawled to the phone and called EMS. Recently d/c from rehab facility. A&Ox4, VSS. Denies LOC, neck pain
77

## 2022-03-18 PROBLEM — R65.10 SIRS (SYSTEMIC INFLAMMATORY RESPONSE SYNDROME) (HCC): Status: ACTIVE | Noted: 2018-10-26

## 2022-03-18 PROBLEM — E87.6 HYPOKALEMIA: Status: ACTIVE | Noted: 2018-10-26

## 2022-03-18 PROBLEM — M19.90 ARTHRITIS: Status: ACTIVE | Noted: 2018-10-26

## 2022-03-18 PROBLEM — G93.89 SUPRASELLAR MASS: Status: ACTIVE | Noted: 2019-05-15

## 2022-03-19 PROBLEM — I10 HYPERTENSION: Status: ACTIVE | Noted: 2018-10-26

## 2022-03-19 PROBLEM — E23.0 PANHYPOPITUITARISM (HCC): Status: ACTIVE | Noted: 2019-05-15

## 2022-03-19 PROBLEM — R53.1 GENERALIZED WEAKNESS: Status: ACTIVE | Noted: 2018-10-26

## 2023-06-14 ENCOUNTER — APPOINTMENT (OUTPATIENT)
Facility: HOSPITAL | Age: 88
DRG: 193 | End: 2023-06-14
Payer: MEDICARE

## 2023-06-14 ENCOUNTER — HOSPITAL ENCOUNTER (INPATIENT)
Facility: HOSPITAL | Age: 88
LOS: 3 days | Discharge: HOSPICE | DRG: 193 | End: 2023-06-17
Attending: STUDENT IN AN ORGANIZED HEALTH CARE EDUCATION/TRAINING PROGRAM | Admitting: FAMILY MEDICINE
Payer: MEDICARE

## 2023-06-14 DIAGNOSIS — E16.2 HYPOGLYCEMIA: ICD-10-CM

## 2023-06-14 DIAGNOSIS — D72.829 LEUKOCYTOSIS, UNSPECIFIED TYPE: ICD-10-CM

## 2023-06-14 DIAGNOSIS — R41.82 ALTERED MENTAL STATUS, UNSPECIFIED ALTERED MENTAL STATUS TYPE: Primary | ICD-10-CM

## 2023-06-14 DIAGNOSIS — R93.2 ABNORMAL CT SCAN, GALLBLADDER: ICD-10-CM

## 2023-06-14 DIAGNOSIS — D64.9 ANEMIA, UNSPECIFIED TYPE: ICD-10-CM

## 2023-06-14 LAB
ALBUMIN SERPL-MCNC: 3.2 G/DL (ref 3.5–5)
ALBUMIN/GLOB SERPL: 0.6 (ref 1.1–2.2)
ALP SERPL-CCNC: 80 U/L (ref 45–117)
ALT SERPL-CCNC: 10 U/L (ref 12–78)
ANION GAP SERPL CALC-SCNC: 8 MMOL/L (ref 5–15)
APPEARANCE UR: CLEAR
AST SERPL-CCNC: 17 U/L (ref 15–37)
BACTERIA URNS QL MICRO: NEGATIVE /HPF
BASOPHILS # BLD: 0 K/UL (ref 0–0.1)
BASOPHILS NFR BLD: 0 % (ref 0–1)
BILIRUB SERPL-MCNC: 0.4 MG/DL (ref 0.2–1)
BILIRUB UR QL: NEGATIVE
BUN SERPL-MCNC: 15 MG/DL (ref 6–20)
BUN/CREAT SERPL: 26 (ref 12–20)
CALCIUM SERPL-MCNC: 9.4 MG/DL (ref 8.5–10.1)
CHLORIDE SERPL-SCNC: 98 MMOL/L (ref 97–108)
CO2 SERPL-SCNC: 29 MMOL/L (ref 21–32)
COLOR UR: ABNORMAL
COMMENT:: NORMAL
COMMENT:: NORMAL
CREAT SERPL-MCNC: 0.58 MG/DL (ref 0.55–1.02)
DIFFERENTIAL METHOD BLD: ABNORMAL
EKG ATRIAL RATE: 96 BPM
EKG DIAGNOSIS: NORMAL
EKG P AXIS: 55 DEGREES
EKG P-R INTERVAL: 192 MS
EKG Q-T INTERVAL: 350 MS
EKG QRS DURATION: 78 MS
EKG QTC CALCULATION (BAZETT): 442 MS
EKG R AXIS: -68 DEGREES
EKG T AXIS: 20 DEGREES
EKG VENTRICULAR RATE: 96 BPM
EOSINOPHIL # BLD: 0.2 K/UL (ref 0–0.4)
EOSINOPHIL NFR BLD: 1 % (ref 0–7)
EPITH CASTS URNS QL MICRO: ABNORMAL /LPF
ERYTHROCYTE [DISTWIDTH] IN BLOOD BY AUTOMATED COUNT: 15.2 % (ref 11.5–14.5)
GLOBULIN SER CALC-MCNC: 5.4 G/DL (ref 2–4)
GLUCOSE BLD STRIP.AUTO-MCNC: 126 MG/DL (ref 65–117)
GLUCOSE BLD STRIP.AUTO-MCNC: 139 MG/DL (ref 65–117)
GLUCOSE BLD STRIP.AUTO-MCNC: 167 MG/DL (ref 65–117)
GLUCOSE BLD STRIP.AUTO-MCNC: 207 MG/DL (ref 65–117)
GLUCOSE BLD STRIP.AUTO-MCNC: 30 MG/DL (ref 65–117)
GLUCOSE BLD STRIP.AUTO-MCNC: 32 MG/DL (ref 65–117)
GLUCOSE BLD STRIP.AUTO-MCNC: 37 MG/DL (ref 65–117)
GLUCOSE BLD STRIP.AUTO-MCNC: 95 MG/DL (ref 65–117)
GLUCOSE SERPL-MCNC: 17 MG/DL (ref 65–100)
GLUCOSE UR STRIP.AUTO-MCNC: 250 MG/DL
HCT VFR BLD AUTO: 34.6 % (ref 35–47)
HGB BLD-MCNC: 9.9 G/DL (ref 11.5–16)
HGB UR QL STRIP: ABNORMAL
IMM GRANULOCYTES # BLD AUTO: 0.2 K/UL (ref 0–0.04)
IMM GRANULOCYTES NFR BLD AUTO: 1 % (ref 0–0.5)
KETONES UR QL STRIP.AUTO: NEGATIVE MG/DL
LACTATE SERPL-SCNC: 0.9 MMOL/L (ref 0.4–2)
LEUKOCYTE ESTERASE UR QL STRIP.AUTO: NEGATIVE
LYMPHOCYTES # BLD: 0.7 K/UL (ref 0.8–3.5)
LYMPHOCYTES NFR BLD: 4 % (ref 12–49)
MAGNESIUM SERPL-MCNC: 2.4 MG/DL (ref 1.6–2.4)
MCH RBC QN AUTO: 22.9 PG (ref 26–34)
MCHC RBC AUTO-ENTMCNC: 28.6 G/DL (ref 30–36.5)
MCV RBC AUTO: 80.1 FL (ref 80–99)
MONOCYTES # BLD: 0.7 K/UL (ref 0–1)
MONOCYTES NFR BLD: 4 % (ref 5–13)
NEUTS SEG # BLD: 16.6 K/UL (ref 1.8–8)
NEUTS SEG NFR BLD: 90 % (ref 32–75)
NITRITE UR QL STRIP.AUTO: NEGATIVE
NRBC # BLD: 0 K/UL (ref 0–0.01)
NRBC BLD-RTO: 0 PER 100 WBC
PH UR STRIP: 6.5 (ref 5–8)
PLATELET # BLD AUTO: 414 K/UL (ref 150–400)
PMV BLD AUTO: 9.3 FL (ref 8.9–12.9)
POTASSIUM SERPL-SCNC: 3.4 MMOL/L (ref 3.5–5.1)
PROT SERPL-MCNC: 8.6 G/DL (ref 6.4–8.2)
PROT UR STRIP-MCNC: 30 MG/DL
RBC # BLD AUTO: 4.32 M/UL (ref 3.8–5.2)
RBC #/AREA URNS HPF: ABNORMAL /HPF (ref 0–5)
RBC MORPH BLD: ABNORMAL
SERVICE CMNT-IMP: ABNORMAL
SERVICE CMNT-IMP: NORMAL
SODIUM SERPL-SCNC: 135 MMOL/L (ref 136–145)
SP GR UR REFRACTOMETRY: 1.02 (ref 1–1.03)
SPECIMEN HOLD: NORMAL
SPECIMEN HOLD: NORMAL
TSH SERPL DL<=0.05 MIU/L-ACNC: 0.7 UIU/ML (ref 0.36–3.74)
URINE CULTURE IF INDICATED: ABNORMAL
UROBILINOGEN UR QL STRIP.AUTO: 1 EU/DL (ref 0.2–1)
WBC # BLD AUTO: 18.4 K/UL (ref 3.6–11)
WBC URNS QL MICRO: ABNORMAL /HPF (ref 0–4)

## 2023-06-14 PROCEDURE — 36556 INSERT NON-TUNNEL CV CATH: CPT

## 2023-06-14 PROCEDURE — 85025 COMPLETE CBC W/AUTO DIFF WBC: CPT

## 2023-06-14 PROCEDURE — 6360000002 HC RX W HCPCS: Performed by: NURSE PRACTITIONER

## 2023-06-14 PROCEDURE — 87040 BLOOD CULTURE FOR BACTERIA: CPT

## 2023-06-14 PROCEDURE — 96374 THER/PROPH/DIAG INJ IV PUSH: CPT

## 2023-06-14 PROCEDURE — 1200000000 HC SEMI PRIVATE

## 2023-06-14 PROCEDURE — 83605 ASSAY OF LACTIC ACID: CPT

## 2023-06-14 PROCEDURE — 74177 CT ABD & PELVIS W/CONTRAST: CPT

## 2023-06-14 PROCEDURE — 84443 ASSAY THYROID STIM HORMONE: CPT

## 2023-06-14 PROCEDURE — 71045 X-RAY EXAM CHEST 1 VIEW: CPT

## 2023-06-14 PROCEDURE — 2580000003 HC RX 258: Performed by: FAMILY MEDICINE

## 2023-06-14 PROCEDURE — 70450 CT HEAD/BRAIN W/O DYE: CPT

## 2023-06-14 PROCEDURE — 83735 ASSAY OF MAGNESIUM: CPT

## 2023-06-14 PROCEDURE — 87340 HEPATITIS B SURFACE AG IA: CPT

## 2023-06-14 PROCEDURE — 96372 THER/PROPH/DIAG INJ SC/IM: CPT

## 2023-06-14 PROCEDURE — 76705 ECHO EXAM OF ABDOMEN: CPT

## 2023-06-14 PROCEDURE — 2580000003 HC RX 258: Performed by: NURSE PRACTITIONER

## 2023-06-14 PROCEDURE — 02HV33Z INSERTION OF INFUSION DEVICE INTO SUPERIOR VENA CAVA, PERCUTANEOUS APPROACH: ICD-10-PCS | Performed by: STUDENT IN AN ORGANIZED HEALTH CARE EDUCATION/TRAINING PROGRAM

## 2023-06-14 PROCEDURE — 82962 GLUCOSE BLOOD TEST: CPT

## 2023-06-14 PROCEDURE — 81001 URINALYSIS AUTO W/SCOPE: CPT

## 2023-06-14 PROCEDURE — 86803 HEPATITIS C AB TEST: CPT

## 2023-06-14 PROCEDURE — 6360000002 HC RX W HCPCS: Performed by: FAMILY MEDICINE

## 2023-06-14 PROCEDURE — 2580000003 HC RX 258: Performed by: STUDENT IN AN ORGANIZED HEALTH CARE EDUCATION/TRAINING PROGRAM

## 2023-06-14 PROCEDURE — 36415 COLL VENOUS BLD VENIPUNCTURE: CPT

## 2023-06-14 PROCEDURE — 80053 COMPREHEN METABOLIC PANEL: CPT

## 2023-06-14 PROCEDURE — 99285 EMERGENCY DEPT VISIT HI MDM: CPT

## 2023-06-14 PROCEDURE — 6360000002 HC RX W HCPCS: Performed by: STUDENT IN AN ORGANIZED HEALTH CARE EDUCATION/TRAINING PROGRAM

## 2023-06-14 PROCEDURE — 86706 HEP B SURFACE ANTIBODY: CPT

## 2023-06-14 PROCEDURE — 87389 HIV-1 AG W/HIV-1&-2 AB AG IA: CPT

## 2023-06-14 PROCEDURE — 93005 ELECTROCARDIOGRAM TRACING: CPT | Performed by: STUDENT IN AN ORGANIZED HEALTH CARE EDUCATION/TRAINING PROGRAM

## 2023-06-14 PROCEDURE — 6360000004 HC RX CONTRAST MEDICATION: Performed by: RADIOLOGY

## 2023-06-14 RX ORDER — TRAZODONE HYDROCHLORIDE 50 MG/1
50 TABLET ORAL NIGHTLY
COMMUNITY
Start: 2023-05-14

## 2023-06-14 RX ORDER — ACETAMINOPHEN 650 MG/1
650 SUPPOSITORY RECTAL EVERY 6 HOURS PRN
Status: DISCONTINUED | OUTPATIENT
Start: 2023-06-14 | End: 2023-06-17 | Stop reason: HOSPADM

## 2023-06-14 RX ORDER — KETOROLAC TROMETHAMINE 30 MG/ML
10 INJECTION, SOLUTION INTRAMUSCULAR; INTRAVENOUS ONCE
Status: COMPLETED | OUTPATIENT
Start: 2023-06-14 | End: 2023-06-14

## 2023-06-14 RX ORDER — LEVOTHYROXINE SODIUM 0.07 MG/1
50 TABLET ORAL
COMMUNITY
Start: 2019-05-23 | End: 2023-06-14

## 2023-06-14 RX ORDER — DEXTROSE MONOHYDRATE 100 MG/ML
INJECTION, SOLUTION INTRAVENOUS CONTINUOUS
Status: DISCONTINUED | OUTPATIENT
Start: 2023-06-14 | End: 2023-06-15

## 2023-06-14 RX ORDER — ACETAMINOPHEN 325 MG/1
650 TABLET ORAL EVERY 6 HOURS PRN
Status: DISCONTINUED | OUTPATIENT
Start: 2023-06-14 | End: 2023-06-17 | Stop reason: HOSPADM

## 2023-06-14 RX ORDER — PREDNISONE 5 MG/1
5 TABLET ORAL DAILY
COMMUNITY
Start: 2023-05-24

## 2023-06-14 RX ORDER — ENOXAPARIN SODIUM 100 MG/ML
40 INJECTION SUBCUTANEOUS DAILY
Status: DISCONTINUED | OUTPATIENT
Start: 2023-06-14 | End: 2023-06-17 | Stop reason: HOSPADM

## 2023-06-14 RX ORDER — SENNA PLUS 8.6 MG/1
2 TABLET ORAL
COMMUNITY

## 2023-06-14 RX ORDER — DEXTROSE MONOHYDRATE 50 MG/ML
INJECTION, SOLUTION INTRAVENOUS CONTINUOUS
Status: DISCONTINUED | OUTPATIENT
Start: 2023-06-14 | End: 2023-06-14

## 2023-06-14 RX ORDER — DULOXETIN HYDROCHLORIDE 20 MG/1
20 CAPSULE, DELAYED RELEASE ORAL 2 TIMES DAILY
COMMUNITY
Start: 2023-06-12

## 2023-06-14 RX ORDER — AMLODIPINE BESYLATE 5 MG/1
5 TABLET ORAL DAILY
COMMUNITY
Start: 2019-05-23

## 2023-06-14 RX ORDER — ONDANSETRON 2 MG/ML
4 INJECTION INTRAMUSCULAR; INTRAVENOUS EVERY 6 HOURS PRN
Status: DISCONTINUED | OUTPATIENT
Start: 2023-06-14 | End: 2023-06-17 | Stop reason: HOSPADM

## 2023-06-14 RX ORDER — SODIUM CHLORIDE 9 MG/ML
INJECTION, SOLUTION INTRAVENOUS PRN
Status: DISCONTINUED | OUTPATIENT
Start: 2023-06-14 | End: 2023-06-17 | Stop reason: HOSPADM

## 2023-06-14 RX ORDER — LATANOPROST 50 UG/ML
1 SOLUTION/ DROPS OPHTHALMIC NIGHTLY
COMMUNITY

## 2023-06-14 RX ORDER — ONDANSETRON 4 MG/1
4 TABLET, ORALLY DISINTEGRATING ORAL EVERY 8 HOURS PRN
Status: DISCONTINUED | OUTPATIENT
Start: 2023-06-14 | End: 2023-06-17 | Stop reason: HOSPADM

## 2023-06-14 RX ORDER — SODIUM CHLORIDE 0.9 % (FLUSH) 0.9 %
5-40 SYRINGE (ML) INJECTION PRN
Status: DISCONTINUED | OUTPATIENT
Start: 2023-06-14 | End: 2023-06-17 | Stop reason: HOSPADM

## 2023-06-14 RX ORDER — POLYETHYLENE GLYCOL 3350 17 G/17G
17 POWDER, FOR SOLUTION ORAL DAILY PRN
Status: DISCONTINUED | OUTPATIENT
Start: 2023-06-14 | End: 2023-06-17 | Stop reason: HOSPADM

## 2023-06-14 RX ORDER — LIDOCAINE 4 G/G
1 PATCH TOPICAL DAILY PRN
Status: DISCONTINUED | OUTPATIENT
Start: 2023-06-14 | End: 2023-06-17 | Stop reason: HOSPADM

## 2023-06-14 RX ORDER — LEVOTHYROXINE SODIUM 0.05 MG/1
50 TABLET ORAL
COMMUNITY

## 2023-06-14 RX ORDER — ACETAMINOPHEN 325 MG/1
650 TABLET ORAL EVERY 6 HOURS PRN
COMMUNITY
End: 2023-06-14 | Stop reason: ALTCHOICE

## 2023-06-14 RX ORDER — ACETAMINOPHEN 325 MG/1
650 TABLET ORAL EVERY 6 HOURS PRN
COMMUNITY

## 2023-06-14 RX ORDER — 0.9 % SODIUM CHLORIDE 0.9 %
500 INTRAVENOUS SOLUTION INTRAVENOUS ONCE
Status: COMPLETED | OUTPATIENT
Start: 2023-06-14 | End: 2023-06-14

## 2023-06-14 RX ORDER — SODIUM CHLORIDE 0.9 % (FLUSH) 0.9 %
5-40 SYRINGE (ML) INJECTION EVERY 12 HOURS SCHEDULED
Status: DISCONTINUED | OUTPATIENT
Start: 2023-06-14 | End: 2023-06-17 | Stop reason: HOSPADM

## 2023-06-14 RX ORDER — ALBUTEROL SULFATE 90 UG/1
2 AEROSOL, METERED RESPIRATORY (INHALATION) EVERY 6 HOURS
COMMUNITY

## 2023-06-14 RX ADMIN — PIPERACILLIN AND TAZOBACTAM 4500 MG: 4; .5 INJECTION, POWDER, LYOPHILIZED, FOR SOLUTION INTRAVENOUS at 15:38

## 2023-06-14 RX ADMIN — DEXTROSE MONOHYDRATE: 100 INJECTION, SOLUTION INTRAVENOUS at 22:24

## 2023-06-14 RX ADMIN — DEXTROSE MONOHYDRATE: 50 INJECTION, SOLUTION INTRAVENOUS at 16:26

## 2023-06-14 RX ADMIN — GLUCAGON 1 MG: KIT at 11:15

## 2023-06-14 RX ADMIN — KETOROLAC TROMETHAMINE 9.9 MG: 30 INJECTION, SOLUTION INTRAMUSCULAR; INTRAVENOUS at 23:24

## 2023-06-14 RX ADMIN — DEXTROSE MONOHYDRATE 250 ML: 100 INJECTION, SOLUTION INTRAVENOUS at 15:59

## 2023-06-14 RX ADMIN — IOPAMIDOL 100 ML: 755 INJECTION, SOLUTION INTRAVENOUS at 14:41

## 2023-06-14 RX ADMIN — AZITHROMYCIN DIHYDRATE 500 MG: 500 INJECTION, POWDER, LYOPHILIZED, FOR SOLUTION INTRAVENOUS at 17:47

## 2023-06-14 RX ADMIN — SODIUM CHLORIDE 500 ML: 9 INJECTION, SOLUTION INTRAVENOUS at 12:01

## 2023-06-14 RX ADMIN — WATER 1000 MG: 1 INJECTION INTRAMUSCULAR; INTRAVENOUS; SUBCUTANEOUS at 17:53

## 2023-06-14 RX ADMIN — DEXTROSE MONOHYDRATE 250 ML: 100 INJECTION, SOLUTION INTRAVENOUS at 11:58

## 2023-06-14 RX ADMIN — SODIUM CHLORIDE, PRESERVATIVE FREE 10 ML: 5 INJECTION INTRAVENOUS at 22:25

## 2023-06-14 ASSESSMENT — PAIN DESCRIPTION - DESCRIPTORS: DESCRIPTORS: ACHING

## 2023-06-14 ASSESSMENT — PAIN SCALES - GENERAL: PAINLEVEL_OUTOF10: 6

## 2023-06-14 ASSESSMENT — PAIN DESCRIPTION - LOCATION: LOCATION: BACK

## 2023-06-14 NOTE — CARE COORDINATION
CM consult received and appreciated. EMR received. Patient is a resident of [facility] [address]. Met w/ great great niece Alaurelia Maura at bedside. Patient is currently sleeping. History obtained. Jocelyn Vasquez is court appointed guardian (since 22). Noted emergency contacts listed informed spouse Emmy Velasco is - (2022) and  Jesu Calixto (sister).  has resided at [address] since September. Niece describes patient as being \"hilariously\" would talk by phone 3-4X/day and visit every other day. Informed CM was not informed patient had a fall last week or presentation to the ED today. Niece would like to consider patient going to another facility. Informed if patient not admitted to the hospital would need to work w/ facility SW to assist w/ alternative placement. Last Care plan meeting in March. CM asked about Goals of Care - noted niece became tearful states \" I want her to make it. \" Per niece patient is a full code and asked \" do I have to make decision now. \" Emotional support offered. Informed was clarifying since limited access w/ documents at this time. Currently Jocelyn Vasquez lives home w/ her son since pandemic and is on disability. OPM was applied for spousal () support through [employer] however has not received decision. Niece would like to be able to get her own home and have patient move in w/ her. At time of assessment this writer did not have insurance information present. Niece informed has Medicare and Medicaid from spouse's benefit. CM department will continue to follow.

## 2023-06-14 NOTE — CARE COORDINATION
Care Management Initial Assessment       RUR: NA  Readmission? No  1st IM letter given? No  1st  letter given: No         06/14/23 1730   Service Assessment   Patient Orientation Unable to Assess  (patient was sleeping at time of assessment)   Cognition Dementia / Early Alzheimer's   History Provided By Child/Family   Primary Caregiver Other (Comment)  (resident at [facility]/ great great niece is legal representative)   Accompanied By/Relationship wei felder / Nata  Members  ([address])   [facility] is: Named in [facility]   PCP Verified by CM Yes   Prior Functional Level Mobility;Assistance with the following:   Current Functional Level Other (see comment)  (TBD)   Can patient return to prior living arrangement Yes   Ability to make needs known: Poor   Financial Resources Medicaid   Discharge Planning   Type of Residence Long-Term Care  (current resident at [facility])       Jonathan Paredes wei felder is Carlos Aedison Ramos is court appointed representative (since 2021) did not have paperwork at bedside. Noted DDNR completed today. Previously inquired if patient could have new nursing facility placement during ED visit. TBD. Case Management will follow for transitions of care needs.

## 2023-06-14 NOTE — ED NOTES
TRANSFER - OUT REPORT:    Verbal report given to [facility] on Kevin Gao  being transferred to [facility] for routine progression of patient care       Report consisted of patient's Situation, Background, Assessment and   Recommendations(SBAR). Information from the following report(s) Nurse Handoff Report, ED SBAR, Adult Overview, MAR, Recent Results, Cardiac Rhythm sinus rhythm, and Neuro Assessment was reviewed with the receiving nurse. [place] Fall Assessment:    Presents to emergency department  because of falls (Syncope, seizure, or loss of consciousness): No  Age > 70: Yes  Altered Mental Status, Intoxication with alcohol or substance confusion (Disorientation, impaired judgment, poor safety awaremess, or inability to follow instructions): Yes  Impaired Mobility: Ambulates or transfers with assistive devices or assistance; Unable to ambulate or transer.: Yes  Nursing Judgement: Yes          Lines:   CVC Triple Lumen 06/14/23 Right Internal jugular (Active)   Date of Last Dressing Change 06/14/23 06/14/23 1215        Opportunity for questions and clarification was provided.       Patient transported with:  Sindhu Barrios  06/14/23 9654

## 2023-06-14 NOTE — SENIOR SERVICES NOTE
Consulted by Emanuel Grayson CM for Senior Services team.     Case discussed with Dr Lui Giraldo and Venice Chong CM. Chart Reviewed. No recent information/ visits available, last visit in 5/10/2019 for similar episode of hypoglycemia, hypothermia, secondary to Panhypopituitarism and suprasellar mass. Patient and niece greeted and at the bedside. Patient is not alert, vital signs are stable, patient will moan and speak a few words to emerita, Stacey Wilson [phone], David Lloyd calls the patient Latonia Cervantes" in which she responded by voice. (Responsible Caregiver/ primary decision maker). Patient is a poor historian. Katey at the bedside providing all information. Medication list from [facility], [address] [place] [address] where patient currently resides reviewed, no history of DM or medications that would cause hypoglycemia. Nimonae states that she normally just \"picks\" at her food. In review of documentation from [facility] noted DNR, however not a \"traveling\" DNR from the [address] [place] [address] had given verbal permission for signing via telephone at that time in Nov. 2021.    -Agreeable and able to sign updated DNR.   -Discussion took place for completion of DNR, when asking about additional options for nasal cannula, oxygen support, feeding tube, medications, David Lloyd became very tearful. Initially stated that she wanted everything done, but stating now that she needs more time to consider and that she would further the discussion when the patient is evaluated by the Hospitalist for Admission.   -Medication Reconciliation performed with paperwork received from [facility], all Allergies updated and verified with emerita. Needs:   -Further discussion with nice at the bedside, primary care giver,  Stacey Wilson [phone], on patients current code status. -CM to assist with LTC concerns. Please refer to Emanuel Grayson CM note for full needs/ details.      Patient to be admitted at this time, all

## 2023-06-14 NOTE — ED PROVIDER NOTES
[facility] EMERGENCY DEP  EMERGENCY DEPARTMENT ENCOUNTER      Pt Name: Keisha Galvan  MRN: [id number]  Jennifergfottoniel 10/11/1932  Date of evaluation: 6/14/2023  Provider: Robert Rascon, [facility]       Chief Complaint   Patient presents with    Altered Mental Status         HISTORY OF PRESENT ILLNESS   (Location/Symptom, Timing/Onset, Context/Setting, Quality, Duration, Modifying Factors, Severity)  Note limiting factors. 72-year-old female brought in from [address] [place] [address] for evaluation unresponsive. Patient was last noted to be well last evening. Was found this morning unresponsive. It was reported that nursing home had given her clonidine per physician order. Was brought to the ED by EMS. They report that her glucose was 33 at fingerstick. They did not attempt an IV line as it was closer to come to the emergency department. Patient unresponsive at the moment. Review of External Medical Records:     Nursing Notes were reviewed. REVIEW OF SYSTEMS    (2-9 systems for level 4, 10 or more for level 5)     Review of Systems   Unable to perform ROS: Patient unresponsive     Except as noted above the remainder of the review of systems was reviewed and negative. PAST MEDICAL HISTORY     Past Medical History:   Diagnosis Date    Arthritis     Hypertension          SURGICAL HISTORY       Past Surgical History:   Procedure Laterality Date    HYSTERECTOMY      TONSILLECTOMY           CURRENT MEDICATIONS       Previous Medications    No medications on file       ALLERGIES     Patient has no known allergies. FAMILY HISTORY     No family history on file.        SOCIAL HISTORY       Social History     Socioeconomic History    Marital status:    Tobacco Use    Smoking status: Never    Smokeless tobacco: Never   Substance and Sexual Activity    Alcohol use: No    Drug use: No   Social History Narrative         ** Merged History Encounter **           PHYSICAL EXAM    (up to 7 for PHYSICIAN NEXT STEPS:  Call the Patient    CHIEF COMPLAINT:  Chief Complaint/Protocol Used: Chest Pain  Onset: Chest pain-started 5 months ago      ASSESSMENT:  ? Onset: Chest pain-started 5 months ago  ? Location: Middle area towards the top  ? Radiation: Denies  ? Onset: Started 5 months ago  ? PATTERN \"Does the pain come and go, or has it been constant since it started?\" \"Does it get worse with exertion?\" Comes and goes  ? Duration: Varies to minutes to hours  ? Severity: Denies at this time, last time had the chest pain was yesterday  ? Cardiac Risk Factors: Denies  ? Pulmonary Risk Factors: Denies  ? Cause: Denies  ? Other Symptoms: Difficulty breathing  ? Pregnancy: Denies, last menstrual period ended yesterday.  -------------------------------------------------------    DISPOSITION:  Disposition Recommendation: Go to ED Now  Questions that led to disposition:  ? [1] Intermittent chest pain or \"angina\" AND [2] increasing in severity or frequency (Exception: pains lasting a few seconds)  Patient Directed To: Unspecified  Patient Intended Action: Unspecified    CALL NOTES:  07/23/2020 at 5:02 PM by Christy Lee  ?  Patient declined ED disposition for chest pain and difficulty breathing. Nurse paged via perfect serve Dr. Bates for declined ED disposition and for the doctor to call the patient to advise further.    DISPOSITION OVERRIDE/PROVIDER CONSULT:  Disposition Override: N/A  Override Source: Unspecified  Consulted with PCP: No  Consulted with On-Call Physician: No    CALLER CONTACT INFO:  Name: Isabel Muñoz (Self)  Phone 1: [phone] (Mobile) - Preferred      ENCOUNTER STARTED:  07/23/20 04:46:06 PM  ENCOUNTER ASSIGNED TO/CLOSED BY:  Christy Lee @ 07/23/20 05:03:15 PM      -------------------------------------------------------    UNDERSTANDS CARE ADVICE: No    AGREES WITH CARE ADVICE: No    WILL FOLLOW CARE ADVICE: No    -------------------------------------------------------

## 2023-06-14 NOTE — ED TRIAGE NOTES
Pt arrives from [facility] for altered mental status and only reponsive to pain.  Hx htn

## 2023-06-14 NOTE — CARE COORDINATION
Case discussed w/ Dr. Anatoly Weiss informed will consult Hospitalist.     CM able to obtain Residential Information Sheet from [facility]. Noted noted patient has Medicaid Prashanth Suzanne / [facility] Dual Complete/ CCCP    Responisible Party / Emergency Contact is Mi  [phone]    Noted CPR Directive (no CPR) signed 11/21. [phone] Communication w/ Patient Registration CM provided insurance information and entered in EMR.  Medicaid Prashanth Cuateu [id number]

## 2023-06-15 PROBLEM — D64.9 ANEMIA: Status: ACTIVE | Noted: 2023-06-15

## 2023-06-15 PROBLEM — Z51.5 PALLIATIVE CARE BY SPECIALIST: Status: ACTIVE | Noted: 2023-06-15

## 2023-06-15 PROBLEM — R41.82 ALTERED MENTAL STATUS: Status: ACTIVE | Noted: 2023-06-15

## 2023-06-15 LAB
ANION GAP SERPL CALC-SCNC: 7 MMOL/L (ref 5–15)
BASOPHILS # BLD: 0 K/UL (ref 0–0.1)
BASOPHILS NFR BLD: 0 % (ref 0–1)
BUN SERPL-MCNC: 10 MG/DL (ref 6–20)
BUN/CREAT SERPL: 13 (ref 12–20)
CALCIUM SERPL-MCNC: 7.9 MG/DL (ref 8.5–10.1)
CHLORIDE SERPL-SCNC: 96 MMOL/L (ref 97–108)
CO2 SERPL-SCNC: 29 MMOL/L (ref 21–32)
CREAT SERPL-MCNC: 0.76 MG/DL (ref 0.55–1.02)
DIFFERENTIAL METHOD BLD: ABNORMAL
EOSINOPHIL # BLD: 0.2 K/UL (ref 0–0.4)
EOSINOPHIL NFR BLD: 2 % (ref 0–7)
ERYTHROCYTE [DISTWIDTH] IN BLOOD BY AUTOMATED COUNT: 15.3 % (ref 11.5–14.5)
EST. AVERAGE GLUCOSE BLD GHB EST-MCNC: 108 MG/DL
GLUCOSE BLD STRIP.AUTO-MCNC: 111 MG/DL (ref 65–117)
GLUCOSE BLD STRIP.AUTO-MCNC: 129 MG/DL (ref 65–117)
GLUCOSE BLD STRIP.AUTO-MCNC: 141 MG/DL (ref 65–117)
GLUCOSE BLD STRIP.AUTO-MCNC: 157 MG/DL (ref 65–117)
GLUCOSE BLD STRIP.AUTO-MCNC: 201 MG/DL (ref 65–117)
GLUCOSE BLD STRIP.AUTO-MCNC: 44 MG/DL (ref 65–117)
GLUCOSE BLD STRIP.AUTO-MCNC: 46 MG/DL (ref 65–117)
GLUCOSE BLD STRIP.AUTO-MCNC: 55 MG/DL (ref 65–117)
GLUCOSE BLD STRIP.AUTO-MCNC: 60 MG/DL (ref 65–117)
GLUCOSE BLD STRIP.AUTO-MCNC: 60 MG/DL (ref 65–117)
GLUCOSE BLD STRIP.AUTO-MCNC: 71 MG/DL (ref 65–117)
GLUCOSE BLD STRIP.AUTO-MCNC: 73 MG/DL (ref 65–117)
GLUCOSE BLD STRIP.AUTO-MCNC: 83 MG/DL (ref 65–117)
GLUCOSE BLD STRIP.AUTO-MCNC: 86 MG/DL (ref 65–117)
GLUCOSE BLD STRIP.AUTO-MCNC: NORMAL MG/DL (ref 65–117)
GLUCOSE SERPL-MCNC: 136 MG/DL (ref 65–100)
HBA1C MFR BLD: 5.4 % (ref 4–5.6)
HBV SURFACE AB SER QL: NONREACTIVE
HBV SURFACE AB SER-ACNC: <3.1 MIU/ML
HBV SURFACE AG SER QL: 0.25 INDEX
HBV SURFACE AG SER QL: NEGATIVE
HCT VFR BLD AUTO: 26.5 % (ref 35–47)
HCV AB SERPL QL IA: NONREACTIVE
HGB BLD-MCNC: 7.9 G/DL (ref 11.5–16)
HIV 1+2 AB+HIV1 P24 AG SERPL QL IA: NONREACTIVE
HIV 1/2 RESULT COMMENT: NORMAL
IMM GRANULOCYTES # BLD AUTO: 0.1 K/UL (ref 0–0.04)
IMM GRANULOCYTES NFR BLD AUTO: 1 % (ref 0–0.5)
LYMPHOCYTES # BLD: 0.8 K/UL (ref 0.8–3.5)
LYMPHOCYTES NFR BLD: 6 % (ref 12–49)
MAGNESIUM SERPL-MCNC: 2.1 MG/DL (ref 1.6–2.4)
MCH RBC QN AUTO: 23.2 PG (ref 26–34)
MCHC RBC AUTO-ENTMCNC: 29.8 G/DL (ref 30–36.5)
MCV RBC AUTO: 77.9 FL (ref 80–99)
MONOCYTES # BLD: 0.8 K/UL (ref 0–1)
MONOCYTES NFR BLD: 6 % (ref 5–13)
NEUTS SEG # BLD: 11.9 K/UL (ref 1.8–8)
NEUTS SEG NFR BLD: 85 % (ref 32–75)
NRBC # BLD: 0 K/UL (ref 0–0.01)
NRBC BLD-RTO: 0 PER 100 WBC
PLATELET # BLD AUTO: 317 K/UL (ref 150–400)
PMV BLD AUTO: 9.4 FL (ref 8.9–12.9)
POTASSIUM SERPL-SCNC: 2.6 MMOL/L (ref 3.5–5.1)
POTASSIUM SERPL-SCNC: 3.5 MMOL/L (ref 3.5–5.1)
PROCALCITONIN SERPL-MCNC: 4.03 NG/ML
RBC # BLD AUTO: 3.4 M/UL (ref 3.8–5.2)
SERVICE CMNT-IMP: ABNORMAL
SERVICE CMNT-IMP: NORMAL
SODIUM SERPL-SCNC: 132 MMOL/L (ref 136–145)
T4 FREE SERPL-MCNC: 0.8 NG/DL (ref 0.8–1.5)
WBC # BLD AUTO: 13.8 K/UL (ref 3.6–11)

## 2023-06-15 PROCEDURE — 83735 ASSAY OF MAGNESIUM: CPT

## 2023-06-15 PROCEDURE — 2580000003 HC RX 258: Performed by: FAMILY MEDICINE

## 2023-06-15 PROCEDURE — 2580000003 HC RX 258: Performed by: STUDENT IN AN ORGANIZED HEALTH CARE EDUCATION/TRAINING PROGRAM

## 2023-06-15 PROCEDURE — 84439 ASSAY OF FREE THYROXINE: CPT

## 2023-06-15 PROCEDURE — 84132 ASSAY OF SERUM POTASSIUM: CPT

## 2023-06-15 PROCEDURE — 6360000002 HC RX W HCPCS: Performed by: NURSE PRACTITIONER

## 2023-06-15 PROCEDURE — 36415 COLL VENOUS BLD VENIPUNCTURE: CPT

## 2023-06-15 PROCEDURE — 6360000002 HC RX W HCPCS: Performed by: FAMILY MEDICINE

## 2023-06-15 PROCEDURE — 99222 1ST HOSP IP/OBS MODERATE 55: CPT | Performed by: INTERNAL MEDICINE

## 2023-06-15 PROCEDURE — 84145 PROCALCITONIN (PCT): CPT

## 2023-06-15 PROCEDURE — 85025 COMPLETE CBC W/AUTO DIFF WBC: CPT

## 2023-06-15 PROCEDURE — 2060000000 HC ICU INTERMEDIATE R&B

## 2023-06-15 PROCEDURE — 82962 GLUCOSE BLOOD TEST: CPT

## 2023-06-15 PROCEDURE — 80048 BASIC METABOLIC PNL TOTAL CA: CPT

## 2023-06-15 PROCEDURE — 2580000003 HC RX 258: Performed by: NURSE PRACTITIONER

## 2023-06-15 PROCEDURE — 83036 HEMOGLOBIN GLYCOSYLATED A1C: CPT

## 2023-06-15 RX ORDER — DEXTROSE MONOHYDRATE 50 MG/ML
INJECTION, SOLUTION INTRAVENOUS CONTINUOUS
Status: DISCONTINUED | OUTPATIENT
Start: 2023-06-15 | End: 2023-06-15

## 2023-06-15 RX ORDER — POTASSIUM CHLORIDE 7.45 MG/ML
10 INJECTION INTRAVENOUS
Status: DISCONTINUED | OUTPATIENT
Start: 2023-06-15 | End: 2023-06-15

## 2023-06-15 RX ORDER — DEXTROSE AND SODIUM CHLORIDE 5; .9 G/100ML; G/100ML
INJECTION, SOLUTION INTRAVENOUS CONTINUOUS
Status: DISCONTINUED | OUTPATIENT
Start: 2023-06-15 | End: 2023-06-16

## 2023-06-15 RX ORDER — POTASSIUM CHLORIDE 29.8 MG/ML
20 INJECTION INTRAVENOUS
Status: COMPLETED | OUTPATIENT
Start: 2023-06-15 | End: 2023-06-15

## 2023-06-15 RX ORDER — LORAZEPAM 2 MG/ML
2 INJECTION INTRAMUSCULAR
Status: ACTIVE | OUTPATIENT
Start: 2023-06-15 | End: 2023-06-16

## 2023-06-15 RX ADMIN — DEXTROSE MONOHYDRATE 125 ML: 100 INJECTION, SOLUTION INTRAVENOUS at 10:36

## 2023-06-15 RX ADMIN — DEXTROSE MONOHYDRATE: 100 INJECTION, SOLUTION INTRAVENOUS at 07:58

## 2023-06-15 RX ADMIN — AZITHROMYCIN DIHYDRATE 500 MG: 500 INJECTION, POWDER, LYOPHILIZED, FOR SOLUTION INTRAVENOUS at 17:56

## 2023-06-15 RX ADMIN — POTASSIUM CHLORIDE 20 MEQ: 29.8 INJECTION, SOLUTION INTRAVENOUS at 07:56

## 2023-06-15 RX ADMIN — DEXTROSE AND SODIUM CHLORIDE: 5; 900 INJECTION, SOLUTION INTRAVENOUS at 14:22

## 2023-06-15 RX ADMIN — SODIUM CHLORIDE, PRESERVATIVE FREE 10 ML: 5 INJECTION INTRAVENOUS at 20:50

## 2023-06-15 RX ADMIN — DEXTROSE MONOHYDRATE 125 ML: 100 INJECTION, SOLUTION INTRAVENOUS at 12:03

## 2023-06-15 RX ADMIN — WATER 1000 MG: 1 INJECTION INTRAMUSCULAR; INTRAVENOUS; SUBCUTANEOUS at 18:00

## 2023-06-15 RX ADMIN — ENOXAPARIN SODIUM 40 MG: 100 INJECTION SUBCUTANEOUS at 08:33

## 2023-06-15 RX ADMIN — POTASSIUM CHLORIDE 20 MEQ: 29.8 INJECTION, SOLUTION INTRAVENOUS at 06:31

## 2023-06-15 RX ADMIN — DEXTROSE MONOHYDRATE 125 ML: 100 INJECTION, SOLUTION INTRAVENOUS at 00:19

## 2023-06-15 ASSESSMENT — PAIN SCALES - GENERAL
PAINLEVEL_OUTOF10: 0

## 2023-06-15 NOTE — CONSULTS
Comprehensive Nutrition Assessment    Type and Reason for Visit: Initial, Consult    Nutrition Recommendations/Plan:   Modified diet to dysphagia soft and bite sized. No further restrictions  Pt refusing to eat; needs encouragement with all meals. Please record intake of all meals and supplements. Thank you nursing. If intake does not improve, recommend GOC discussion. Ensure enlive BID, magic cup once daily  Pt spitting out food- recommend bedside swallow eval   Continue electrolyte repletions       Malnutrition Assessment:  Malnutrition Status: At risk for malnutrition (Comment) (06/15/23 1106)           Nutrition Assessment:    PMHx: HTN    Consult appreciated. 80 y.o. female admitted with unresponsiveness, hypoglycemia, hyponatremia, CAP. Receiving lyte repletions, has had D10 bolus infusion. Visited with pt and family member at bedside. Family member and RD both attempted to feed pt some breakfast, also got some ice cream which pt reportedly loves. Pt refused all attempts at feeding. Seemed confused. Per family pt has been spitting food out. No choking or coughing. Pt typically has no issues swallowing. Pt missing most dentition- diet modified to soft and bite sized. Pt does like vanilla ensure and drinks regularly. Difficult to assess muscle loss d/t age. Did not perform NFPE d/t mental status. Per family pt has \"always been small\". Limited wt history in EMR. Family unsure how pt was eating directly pta. No evidence for malnutrition at this time but pt is at risk. For now, will trial ensure enlive BID and magic cup. If intake does not improve recommend GOC discussion given her age. Can go NGT route for enteral feeds if that's what family wants, though expect pt will pull NGT with current mental status.    K 2.6, Na 132, BG       Nutritionally Significant Medications:  Zithromax, rocephin      Estimated Daily Nutrient Needs:  Energy Requirements Based On: Kcal/kg  Weight Used for Energy Requirements:
quite the jokester with her. We talk about how further workup is still ongoing, to see if there is a treatable cause of the low blood sugars. Rosa Downs quite anxious, so worried about losing her, wants to know what will happen if we can't get her eating again. Certainly still some unknown, patient is much improved with treatment of hypoglycemia, but cause still not determined. Let her know our team will continue to follow and discuss work up with her and next steps. Initial consult note routed to primary continuity provider and/or primary health care team members  Communicated plan of care with: Palliative IDT, [address] Team    ADVANCE CARE PLANNING / TREATMENT PREFERENCES:     GOALS OF CARE:  []-Comfort   []-Cure   []-Prolong life   [x]-Recovery from acute illness   []-Rehabilitation  []-Other:         TREATMENT PREFERENCES:     Patient and family's personal goals include: recovery from acute issues    Important upcoming milestones or family events: not discussed today    The patient identifies the following as important for living well: unable to say    Advance Care Planning:  [x] The [facility] Interdisciplinary Team has updated the ACP Navigator with Mandeep and Patient [address]     The patient has appointed the following active healthcare agents:    Primary Decision Maker: Eustace Apgar - Niece/Nephew - [phone]    The Patient has the following current code status:    Code Status: DNR         Other:    As far as possible, the palliative care team has discussed with patient / health care proxy about goals of care / treatment preferences for patient.      HISTORY:     History obtained from: chart    CHIEF COMPLAINT: altered mental status, unresponsive with low BG    HPI/SUBJECTIVE:    The patient is:   [x] Verbal and participatory  [] Non-participatory due to:     80year old female found to be unresponsive at LT  At time of EMS arrival Blood sugar

## 2023-06-15 NOTE — PLAN OF CARE
Problem: Safety - Medical Restraint  Goal: Remains free of injury from restraints (Restraint for Interference with Medical Device)  Description: INTERVENTIONS:  1. Determine that other, less restrictive measures have been tried or would not be effective before applying the restraint  2. Evaluate the patient's condition at the time of restraint application  3. Inform patient/family regarding the reason for restraint  4.  Q2H: Monitor safety, psychosocial status, comfort, nutrition and hydration  Outcome: Progressing     Problem: Discharge Planning  Goal: Discharge to home or other facility with appropriate resources  Outcome: Progressing     Problem: Safety - Adult  Goal: Free from fall injury  Outcome: Progressing     Problem: Pain  Goal: Verbalizes/displays adequate comfort level or baseline comfort level  Outcome: Progressing

## 2023-06-16 PROBLEM — F03.C0 SEVERE DEMENTIA WITHOUT BEHAVIORAL DISTURBANCE, PSYCHOTIC DISTURBANCE, MOOD DISTURBANCE, OR ANXIETY (HCC): Status: ACTIVE | Noted: 2023-06-16

## 2023-06-16 PROBLEM — R63.30 FEEDING DIFFICULTIES: Status: ACTIVE | Noted: 2023-06-16

## 2023-06-16 LAB
ANION GAP SERPL CALC-SCNC: 5 MMOL/L (ref 5–15)
BUN SERPL-MCNC: 6 MG/DL (ref 6–20)
BUN/CREAT SERPL: 10 (ref 12–20)
CALCIUM SERPL-MCNC: 8 MG/DL (ref 8.5–10.1)
CHLORIDE SERPL-SCNC: 109 MMOL/L (ref 97–108)
CO2 SERPL-SCNC: 29 MMOL/L (ref 21–32)
CORTIS AM PEAK SERPL-MCNC: 5.8 UG/DL (ref 4.3–22.45)
CREAT SERPL-MCNC: 0.58 MG/DL (ref 0.55–1.02)
ERYTHROCYTE [DISTWIDTH] IN BLOOD BY AUTOMATED COUNT: 15.4 % (ref 11.5–14.5)
GLUCOSE BLD STRIP.AUTO-MCNC: 108 MG/DL (ref 65–117)
GLUCOSE BLD STRIP.AUTO-MCNC: 118 MG/DL (ref 65–117)
GLUCOSE BLD STRIP.AUTO-MCNC: 60 MG/DL (ref 65–117)
GLUCOSE BLD STRIP.AUTO-MCNC: 81 MG/DL (ref 65–117)
GLUCOSE BLD STRIP.AUTO-MCNC: 94 MG/DL (ref 65–117)
GLUCOSE SERPL-MCNC: 131 MG/DL (ref 65–100)
HCT VFR BLD AUTO: 27.8 % (ref 35–47)
HGB BLD-MCNC: 8.1 G/DL (ref 11.5–16)
MCH RBC QN AUTO: 23.3 PG (ref 26–34)
MCHC RBC AUTO-ENTMCNC: 29.1 G/DL (ref 30–36.5)
MCV RBC AUTO: 79.9 FL (ref 80–99)
NRBC # BLD: 0 K/UL (ref 0–0.01)
NRBC BLD-RTO: 0 PER 100 WBC
PLATELET # BLD AUTO: 340 K/UL (ref 150–400)
PMV BLD AUTO: 9.7 FL (ref 8.9–12.9)
POTASSIUM SERPL-SCNC: 3.1 MMOL/L (ref 3.5–5.1)
POTASSIUM SERPL-SCNC: 4.4 MMOL/L (ref 3.5–5.1)
RBC # BLD AUTO: 3.48 M/UL (ref 3.8–5.2)
SERVICE CMNT-IMP: ABNORMAL
SERVICE CMNT-IMP: ABNORMAL
SERVICE CMNT-IMP: NORMAL
SODIUM SERPL-SCNC: 143 MMOL/L (ref 136–145)
WBC # BLD AUTO: 9.2 K/UL (ref 3.6–11)

## 2023-06-16 PROCEDURE — 85027 COMPLETE CBC AUTOMATED: CPT

## 2023-06-16 PROCEDURE — 2580000003 HC RX 258: Performed by: FAMILY MEDICINE

## 2023-06-16 PROCEDURE — 36415 COLL VENOUS BLD VENIPUNCTURE: CPT

## 2023-06-16 PROCEDURE — 80048 BASIC METABOLIC PNL TOTAL CA: CPT

## 2023-06-16 PROCEDURE — 6360000002 HC RX W HCPCS: Performed by: FAMILY MEDICINE

## 2023-06-16 PROCEDURE — 1100000000 HC RM PRIVATE

## 2023-06-16 PROCEDURE — 84132 ASSAY OF SERUM POTASSIUM: CPT

## 2023-06-16 PROCEDURE — 82962 GLUCOSE BLOOD TEST: CPT

## 2023-06-16 PROCEDURE — 2580000003 HC RX 258: Performed by: STUDENT IN AN ORGANIZED HEALTH CARE EDUCATION/TRAINING PROGRAM

## 2023-06-16 PROCEDURE — 6370000000 HC RX 637 (ALT 250 FOR IP): Performed by: STUDENT IN AN ORGANIZED HEALTH CARE EDUCATION/TRAINING PROGRAM

## 2023-06-16 PROCEDURE — 82533 TOTAL CORTISOL: CPT

## 2023-06-16 PROCEDURE — 6360000002 HC RX W HCPCS

## 2023-06-16 PROCEDURE — 99233 SBSQ HOSP IP/OBS HIGH 50: CPT | Performed by: PHYSICAL MEDICINE & REHABILITATION

## 2023-06-16 RX ORDER — CEFUROXIME AXETIL 250 MG/1
500 TABLET ORAL EVERY 12 HOURS SCHEDULED
Status: DISCONTINUED | OUTPATIENT
Start: 2023-06-16 | End: 2023-06-16

## 2023-06-16 RX ORDER — AZITHROMYCIN 250 MG/1
500 TABLET, FILM COATED ORAL DAILY
Status: COMPLETED | OUTPATIENT
Start: 2023-06-16 | End: 2023-06-17

## 2023-06-16 RX ORDER — POTASSIUM CHLORIDE 750 MG/1
20 TABLET, FILM COATED, EXTENDED RELEASE ORAL ONCE
Status: DISCONTINUED | OUTPATIENT
Start: 2023-06-16 | End: 2023-06-16 | Stop reason: SDUPTHER

## 2023-06-16 RX ORDER — CEFUROXIME AXETIL 250 MG/1
250 TABLET ORAL EVERY 12 HOURS SCHEDULED
Status: DISCONTINUED | OUTPATIENT
Start: 2023-06-16 | End: 2023-06-17 | Stop reason: HOSPADM

## 2023-06-16 RX ORDER — AMLODIPINE BESYLATE 5 MG/1
5 TABLET ORAL DAILY
Status: DISCONTINUED | OUTPATIENT
Start: 2023-06-16 | End: 2023-06-17 | Stop reason: HOSPADM

## 2023-06-16 RX ORDER — POTASSIUM CHLORIDE 29.8 MG/ML
20 INJECTION INTRAVENOUS
Status: COMPLETED | OUTPATIENT
Start: 2023-06-16 | End: 2023-06-16

## 2023-06-16 RX ADMIN — POTASSIUM BICARBONATE 25 MEQ: 977.5 TABLET, EFFERVESCENT ORAL at 09:41

## 2023-06-16 RX ADMIN — POTASSIUM CHLORIDE 20 MEQ: 29.8 INJECTION, SOLUTION INTRAVENOUS at 05:08

## 2023-06-16 RX ADMIN — SODIUM CHLORIDE, PRESERVATIVE FREE 10 ML: 5 INJECTION INTRAVENOUS at 23:03

## 2023-06-16 RX ADMIN — CEFUROXIME AXETIL 250 MG: 250 TABLET, FILM COATED ORAL at 23:03

## 2023-06-16 RX ADMIN — SODIUM CHLORIDE, PRESERVATIVE FREE 10 ML: 5 INJECTION INTRAVENOUS at 09:42

## 2023-06-16 RX ADMIN — AZITHROMYCIN MONOHYDRATE 500 MG: 250 TABLET ORAL at 15:13

## 2023-06-16 RX ADMIN — ENOXAPARIN SODIUM 40 MG: 100 INJECTION SUBCUTANEOUS at 09:41

## 2023-06-16 RX ADMIN — AMLODIPINE BESYLATE 5 MG: 5 TABLET ORAL at 11:49

## 2023-06-16 RX ADMIN — DEXTROSE AND SODIUM CHLORIDE: 5; 900 INJECTION, SOLUTION INTRAVENOUS at 04:03

## 2023-06-16 RX ADMIN — POTASSIUM CHLORIDE 20 MEQ: 29.8 INJECTION, SOLUTION INTRAVENOUS at 06:24

## 2023-06-16 ASSESSMENT — PAIN SCALES - GENERAL: PAINLEVEL_OUTOF10: 0

## 2023-06-16 NOTE — PROGRESS NOTES
Admission Medication Reconciliation:    Information obtained from: [facility] medication list updated with information provided by [facility] dated 23. Summary:     Medications added: senna  Medications deleted: NA  Doses changed: NA    Inpatient orders have been reviewed and no changes are needed. Significant PMH/Disease States:   Past Medical History:   Diagnosis Date    Arthritis     Hypertension        Allergies:  Latex, Erythromycin, Neosporin [bacitracin-polymyxin b], and Penicillins    Prior to Admission Medications:   Prior to Admission Medications   Prescriptions Last Dose Informant Patient Reported? Taking? DULoxetine (CYMBALTA) 20 MG extended release capsule 2023  Yes No   Si capsule 2 times daily   acetaminophen (TYLENOL) 325 MG tablet Past Week  Yes Yes   Sig: Take 2 tablets by mouth every 6 hours as needed for Fever or Pain   albuterol sulfate HFA (VENTOLIN HFA) 108 (90 Base) MCG/ACT inhaler 2023  Yes Yes   Sig: Inhale 2 puffs into the lungs in the morning and 2 puffs at noon and 2 puffs in the evening and 2 puffs before bedtime. amLODIPine (NORVASC) 5 MG tablet 2023  Yes Yes   Sig: Take 1 tablet by mouth daily   latanoprost (XALATAN) 0.005 % ophthalmic solution 2023  Yes Yes   Sig: Place 1 drop into both eyes nightly   levothyroxine (SYNTHROID) 50 MCG tablet 2023  Yes Yes   Sig: Take 1 tablet by mouth every morning (before breakfast)   levothyroxine (SYNTHROID) 75 MCG tablet   Yes No   Sig: Take 50 mcg by mouth every morning (before breakfast)   predniSONE (DELTASONE) 5 MG tablet 2023  Yes No   Sig: Take 1 tablet by mouth daily   senna (SENOKOT) 8.6 MG tablet 2023  Yes Yes   Sig: Take 2 tablets by mouth nightly   traZODone (DESYREL) 50 MG tablet 2023  Yes No   Sig: Take 1 tablet by mouth nightly      Facility-Administered Medications: None     Thank you for allowing me to participate in the care of this patient.   Please contact the pharmacy at
During bed side shift report patient was found out of bed. She was redirected and placed back in bed. Patient then proceeded to attempt to pull at her cvc. Patient was educated and redirected several times, unsuccessful. NP notified.
Hospitalist Cross Cover 7p-7a    Contacted by RN - potassium  - Potassium 2.6 on AM labs  - Unable to take PO  - Add 4 x 10meq/100mL runs of Kcl  - Check mag    --Jacey Laboy NP  [facility]    Addendum - noted she has central access  - Changed to 2x 20meq/50ml KCl
Hospitalist Cross Cover 7p-7a    S  Rapid response called - hypoglycemic, unresponsive  - BG 30 - admitted with similar presentation  - Ongoing hypoglycemia through the day despite D5 infusion, glucagon    O  PHYSICAL EXAM:   General: Obtunded, eyes closed, no acute distress  Chest: Clear to auscultation bilaterally   CVS: RRR, S1 S2 heard, no murmurs/rubs/gallops  Abd: Soft, non-tender, non-distended, +bowel sounds   Neuro/Psych: Patient confused  Pulses: 2+, symmetric in all extremities    A/P  Unresponsive  - Secondary to hypoglycemia - BG 30  - Received D10 bolus, mentation improving during rapid  - Continue frequent BG monitoring, empiric abx as ordered    Hypoglycemia  - BG 30, persistent throughout the day  - D5W infusion already running, change to D10 infusion  - Received Glucagon already today  - Increase frequency of BG checks - repeat in an hour then Q2h as allowed by nursing ratio on unit. - May need higher level of care if ongoing hypoglycemia despite frequent checks. For now remain on tele.     --Dilcia Miranda NP  [facility]
Hospitalist Progress Note  David Sameul MD  Answering service: [phone] from in house phone        Date of Service:  2023  NAME:  Griselda Mean  :  10/11/1932  MRN:  [id number]      Admission Summary:   HPI: Arben Yin is a 80 y.o. female who presents from her long-term care facility for evaluation of unresponsiveness. Patient was noted to be well last evening. This morning, patient was noted by staff at her LTC to be unresponsive. Initial blood sugars noted to be 33. EMS were called and subsequently presented to the emergency room. Of note, patient has not received any dextrose due to lack of IV access during on route. Subsequently patient has received glucagon in the ER, subsequent attempts to perform IV access were unsuccessful. ER physician has placed central line. Subsequently patient has received D10 and now running D5. Patient's mental status back to baseline now. In regards to her hypoglycemia, etiology was unknown, home medications list not available at this time, not sure if patient on any diabetic medications. Further work-up in the ER shows that patient with leukocytosis with WBC count of over 18,000, UA was unremarkable chest x-ray shows streaky left basilar atelectasis/airspace disease. CT abdomen and pelvis shows abnormal appearance of the gallbladder neck and to consider follow-up dedicated ultrasound for further characterization. Ultrasound pending at this time. Hospitalist service requested to admit the patient for further evaluation management.      The patient denies any headache, blurry vision, sore throat, trouble swallowing, trouble with speech, chest pain, SOB, cough, fever, chills, N/V/D, abd pain, urinary symptoms, constipation, recent travels, sick contacts, focal or generalized neurological symptoms, falls, injuries, rashes, contact with COVID-19
Palliative Medicine      Code Status: DNR    Advance Care Planning:    The patient's niece shares that she is the patient's Ethan Chinchilla, SW asked her to please bring in paperwork- she shares she will provide for hospital next time she comes in     Patient / Family Encounter Documentation    Participants (names): Dr. Daly Funes, nimonae Forte, [place]"     Narrative: The Palliative Medicine SW and Dr. Daly Funes met with the patient's niece Jennifer Forte and the patient at bedside to introduce our role and offer support. Jennifer Forte provides additional history- patient is typically interactive, functional with a walker, making jokes, etc. She shares that she does have problems with eating- she shares she often will spit food out, but is a picky eater- she reflects that the patient is a  so she has certain preferences. We discuss the patient's hypoglycemia, and gain additional social history. We discuss the unknowns, emerita describes the patient's current presentation as a very sudden change- she shares patient was dressing herself, ambulating with a walker, and communicative/engaging prior to admission. The patient's niece is appropriately tearful at times, she does worry about the patient not eating- she asks what the worst case scenario would be if she doesn't eat- we share we will have to see how she does in upcoming days. Would recommend for future discussions taking niece outside of the room- during our encounter she whispers and at times difficult to understand, she is worried about the patient hearing information and shares she doesn't want the patient to know she is at [facility]- the hospital is ok, but the patient's loved one passed away here at [facility] and it is traumatic for the patient. Introduction to our team today- will continue to follow along with you. Psychosocial Issues Identified/ Resilience Factors:  The patient resides at [facility], has lived there for the
Palliative Medicine Consult  Jonny: [phone] ()    Patient Name: Millie Cheung  YOB: 1932    Date of Initial Consult: 6/15/2023  Date of Today's Visit: 2023  Reason for Consult: care decisions  Requesting Provider: Dr. Rodriguez Decree:   Carie Cottrelldayana\" is a 80 y.o. 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 CT Scan abd:  abnormal neck of gallbladder, cardiomegaly, umbilical hernia  CXR: streaked L basilar airspace disease  Head CT: stable suprasellar mass     Current medical issues leading to Palliative Medicine involvement include: hypoglycemia of unknown etiology. Social: Lives at [address] [address] since 2021  [facility] appointed guardian  (since 2022) is her great great niece, Larry Corrigan [phone]  Family calls her Pedro Nicole", usually able to call niece and converse well. At baseline, feeds herself (picky eater) and is ambulatory with rollator  Retired from work as a  as well as being a  at [employer]. 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 Discuss w/ guardian/great niece Audrey Blake as well as sister Cammy that pt's hypoglycemia is due to her spitting out her food- there is no issue w/ her swallowing, but it is behavioral due to her dementia.    Pt may improve some, but family realizes that pt is not going back to her baseline where she was a few weeks
SLP Contact Note    SLP consult received and appreciated. Patient chart reviewed and discussed with Dr. Maurice White. Concern regarding pt expectorating food. Suspect that this is more related to behavior due to dementia or not wanting to [address]. Would consider liberalizing diet to either easy to chew or regular diet as suspect that changing diet to something more restrictive would only further limit patient's intake. Pt does not have any signs of clinical intolerance of PO intake (lungs clear), and unfortunately SLP cannot address her cognitive status of expectorating PO due to her dementia. Would consider food/drink for comfort and not force feeding. Feeding tube contraindicated for this patient given her hx of dementia as potential benefits of tube feeding do not outweigh the associated treatment burdens. Research shows that in patients with advanced dementia PEG tube placement is associated with substantial patient burdens including recurrent and new onset aspiration, tube-associated and aspiration-related infection, increased oral secretions that are difficult to manage, discomfort, tube malfunction, pressure wounds, and the use of physical and chemical restraints. Furthermore, in the geriatric population, research shows that there is no proven benefit in weight gain or markers of nutrition (albumin, prealbumin) in patients with malnutrition due to impaired oral intake. Agree with palliative involvement. Dr. Maurice White in agreement with SLP plan. Will sign off.       Thank you,  TAN FranceEd, [address] [facility]  Speech-Language Pathologist
Sepsis Screening  Are two or more SIRS criteria present? No  Is the patient's history suggestive of a new infection?  No
TRANSFER - IN REPORT:    Verbal report received from [facility] on Rickey Loser  being received from ED for routine progression of patient care      Report consisted of patient's Situation, Background, Assessment and   Recommendations(SBAR). Information from the following report(s) ED Encounter Summary was reviewed with the receiving nurse. Opportunity for questions and clarification was provided. Assessment completed upon patient's arrival to unit and care assumed.
Perfect Serve  Options provided:  -- No malnutrition  -- Moderate malnutrition confirmed  -- malnutrition, please specify degree. -- Other - I will add my own diagnosis  -- Disagree - Not applicable / Not valid  -- Disagree - Clinically unable to determine / Unknown  -- Refer to Clinical Documentation Reviewer    PROVIDER RESPONSE TEXT:    This patient has moderate malnutrition. Query created by: Mara Cowart on 6/16/2023 6:54 AM      QUERY TEXT:      Dear attending,    Pt admitted with elevated WBC and elevated neutrophils. Procalcitonin level   was elevated on 6/15. Pt noted to have documentation of CAP. However, per the   progress note of 6/15- chest x-ray is not truly convincing for pneumonia    If possible, please document in the progress notes and discharge summary if   you are evaluating and/or treating any of the following:      Note: CAP and HCAP indicate where the pneumonia was acquired, not a specific   type.     The medical record reflects the following:    Risk Factors: has dementia, had episode of unresponsiveness at [facility]    Clinical Indicators: 6/14-WBC 18.4, neutrophils 90, 6/15-procal 4.036/16-WBC   9.2  Per H&P- Community-acquired pneumonia Patient with leukocytosis, chest x-ray   shows streaky left basilar atelectasis/airspace disease  Start empiric Rocephin and Zithromax, follow blood cultures  6/15-Per PN- chest x-ray is not truly convincing for pneumonia,  obtain PCT, continue    Treatment: IV Rocephin, IV azithromycin, Monitor vital signs, labwork,   imaging, IV Zosyn 4500 mg x 1 on 6/14      Thank you,  Rachael Bergeron RN, BSN, [facility], [facility]  Clinical Documentation Improvement  [phone] or via Perfect Serve  Options provided:  -- Aspiration pneumonia  -- Viral pneumonia  -- Bacterial pneumonia  -- No pneumonia  -- Other - I will add my own diagnosis  -- Disagree - Not applicable / Not valid  -- Disagree - Clinically unable to determine / Unknown  -- Refer to Clinical Documentation
interventions that required my frequent assessment to treat or prevent imminent deterioration. I personally spent 40 minutes of critical care time. This is time spent at this critically ill patient's bedside actively involved in patient care as well as the coordination of care and discussions with the patient's family. This does not include any procedural time which has been billed separately. Code status: dnr  Prophylaxis: scd  Care Plan discussed with: Patient/family, nurse, case management  Anticipated Disposition: tbd      Principal Problem:    Hypoglycemia  Resolved Problems:    * No resolved hospital problems. *            Review of Systems:   Pertinent items are noted in HPI. Vital Signs:    Last 24hrs VS reviewed since prior progress note.  Most recent are:  /66   Pulse 72   Temp 98.4 °F (36.9 °C) (Oral)   Resp 19   Ht 1.575 m (5' 2\")   Wt 58.4 kg (128 lb 12 oz)   SpO2 95%   BMI 23.55 kg/m²        Intake/Output Summary (Last 24 hours) at 6/15/2023 1355  Last data filed at 6/15/2023 0551  Gross per 24 hour   Intake --   Output 500 ml   Net -500 ml        Physical Examination:     I had a face to face encounter with this patient and independently examined them on 6/15/2023 as outlined below:          General : alert x 1 to name, awake, no acute distress,   HEENT: PEERL, EOMI, moist mucus membrane  Neck: supple, no JVD, no meningeal signs  Chest: Clear to auscultation bilaterally   CVS: S1 S2 heard, Capillary refill less than 2 seconds  Abd: soft/ non tender, non distended, BS physiological,   Ext: no clubbing, no cyanosis, no edema, brisk 2+ DP pulses  Neuro/Psych: pleasant mood and affect, CN 2-12 grossly intact, sensory grossly within normal limit, Strength 5/5 in all extremities  Skin: warm            Data Review:    Review and/or order of clinical lab test  Review and/or order of tests in the radiology section of CPT  Review and/or order of tests in the medicine section of

## 2023-06-16 NOTE — PLAN OF CARE
Problem: Safety - Medical Restraint  Goal: Remains free of injury from restraints (Restraint for Interference with Medical Device)  Description: INTERVENTIONS:  1. Determine that other, less restrictive measures have been tried or would not be effective before applying the restraint  2. Evaluate the patient's condition at the time of restraint application  3. Inform patient/family regarding the reason for restraint  4.  Q2H: Monitor safety, psychosocial status, comfort, nutrition and hydration  Outcome: Progressing  Flowsheets (Taken 6/15/2023 2045)  Remains free of injury from restraints (restraint for interference with medical device): Every 2 hours: Monitor safety, psychosocial status, comfort, nutrition and hydration

## 2023-06-16 NOTE — CARE COORDINATION
Transition of Care Plan:    RUR: 12% Low   Prior Level of Functioning: Walker for ambulation. Assistance with bathing and dressing. Independent eating with puree diet   Disposition: Return to LTC at [address] [place] [address] vs new LTC facility  Follow up appointments: TBD  DME needed: None identified   Transportation at discharge: BLS  IM/IMM Medicare/ letter given: 2nd will be given   Caregiver Contact:   Primary contact legal guardian - great great niece Severiano Marks [phone]  Sister secondary contact Adeline Dave [phone]  Discharge Caregiver contacted prior to discharge? Yes  Care Conference needed? No   Barriers to discharge:  Medical stability    [facility] accepted pt for hospice service. Hospice will begin admission/consents process. CM updated Vernell [phone] that pt possibly will dc Saturday back to [facility] on Hospice services. Vernell asked that weekend CM call her to confirm bed/schedule return to [facility]. CM updated by Palliative team that pt's family is choosing Hospice. Per Palliative, pt's great great niece/guardian Jennifer Forte is in agreement for hospice service at [facility]. CM faxing Hospice referral to [facility]. Pt discussed in IMCU rounds. STALIN Monday/early next week. DC likely earlier if pt admits to hospice services. CM sent referral to [facility] in [address] [place] [address] link for return to LTC. CM will follow to set up dc transportation.      EMILI Andre

## 2023-06-16 NOTE — PLAN OF CARE
Problem: Safety - Medical Restraint  Goal: Remains free of injury from restraints (Restraint for Interference with Medical Device)  Description: INTERVENTIONS:  1. Determine that other, less restrictive measures have been tried or would not be effective before applying the restraint  2. Evaluate the patient's condition at the time of restraint application  3. Inform patient/family regarding the reason for restraint  4. Q2H: Monitor safety, psychosocial status, comfort, nutrition and hydration  6/16/2023 1314 by Kevin Diaz RN  Outcome: Progressing  6/16/2023 0853 by Shirley Quintanilla RN  Outcome: Progressing  Flowsheets (Taken 6/15/2023 2045)  Remains free of injury from restraints (restraint for interference with medical device): Every 2 hours: Monitor safety, psychosocial status, comfort, nutrition and hydration     Problem: Discharge Planning  Goal: Discharge to home or other facility with appropriate resources  6/16/2023 0853 by Shirley Quintanilla RN  Outcome: Progressing  Flowsheets (Taken 6/16/2023 0830 by Kevin Diaz RN)  Discharge to home or other facility with appropriate resources: Identify barriers to discharge with patient and caregiver     Problem: Safety - Adult  Goal: Free from fall injury  6/16/2023 1314 by Kevin Diaz RN  Outcome: Progressing  6/16/2023 0853 by Shirley Quintanilla RN  Outcome: Progressing     Problem: Pain  Goal: Verbalizes/displays adequate comfort level or baseline comfort level  6/16/2023 1314 by Kevin Diaz RN  Outcome: Progressing  6/16/2023 0853 by Shirley Quintanilla RN  Outcome: Progressing     Problem: Skin/Tissue Integrity  Goal: Absence of new skin breakdown  Description: 1. Monitor for areas of redness and/or skin breakdown  2. Assess vascular access sites hourly  3. Every 4-6 hours minimum:  Change oxygen saturation probe site  4.   Every 4-6 hours:  If on nasal continuous positive airway pressure, respiratory therapy assess nares and determine need for appliance

## 2023-06-17 VITALS
WEIGHT: 134.92 LBS | BODY MASS INDEX: 24.83 KG/M2 | RESPIRATION RATE: 14 BRPM | HEIGHT: 62 IN | OXYGEN SATURATION: 96 % | TEMPERATURE: 98.9 F | DIASTOLIC BLOOD PRESSURE: 73 MMHG | SYSTOLIC BLOOD PRESSURE: 167 MMHG | HEART RATE: 74 BPM

## 2023-06-17 PROCEDURE — 6360000002 HC RX W HCPCS: Performed by: FAMILY MEDICINE

## 2023-06-17 PROCEDURE — 6370000000 HC RX 637 (ALT 250 FOR IP): Performed by: STUDENT IN AN ORGANIZED HEALTH CARE EDUCATION/TRAINING PROGRAM

## 2023-06-17 RX ORDER — CEFUROXIME AXETIL 250 MG/1
250 TABLET ORAL EVERY 12 HOURS SCHEDULED
Qty: 3 TABLET | Refills: 0 | Status: SHIPPED
Start: 2023-06-17 | End: 2023-06-19

## 2023-06-17 RX ORDER — AZITHROMYCIN 500 MG/1
500 TABLET, FILM COATED ORAL DAILY
Qty: 1 TABLET | Refills: 0 | Status: SHIPPED
Start: 2023-06-17 | End: 2023-06-18

## 2023-06-17 RX ADMIN — AZITHROMYCIN MONOHYDRATE 500 MG: 250 TABLET ORAL at 10:05

## 2023-06-17 RX ADMIN — AMLODIPINE BESYLATE 5 MG: 5 TABLET ORAL at 10:05

## 2023-06-17 NOTE — PLAN OF CARE
Problem: Safety - Medical Restraint  Goal: Remains free of injury from restraints (Restraint for Interference with Medical Device)  Description: INTERVENTIONS:  1. Determine that other, less restrictive measures have been tried or would not be effective before applying the restraint  2. Evaluate the patient's condition at the time of restraint application  3. Inform patient/family regarding the reason for restraint  4. Q2H: Monitor safety, psychosocial status, comfort, nutrition and hydration  6/16/2023 1314 by Geronimo Alvarez RN  Outcome: Progressing     Problem: Pain  Goal: Verbalizes/displays adequate comfort level or baseline comfort level  6/17/2023 0245 by Nisreen Shah RN  Outcome: Progressing  6/16/2023 1314 by Geronimo Alvarez RN  Outcome: Progressing     Problem: Skin/Tissue Integrity  Goal: Absence of new skin breakdown  Description: 1. Monitor for areas of redness and/or skin breakdown  2. Assess vascular access sites hourly  3. Every 4-6 hours minimum:  Change oxygen saturation probe site  4. Every 4-6 hours:  If on nasal continuous positive airway pressure, respiratory therapy assess nares and determine need for appliance change or resting period.   Outcome: Progressing

## 2023-06-17 NOTE — DISCHARGE SUMMARY
3    Discharge Summary       PATIENT ID: David Trevino  MRN: [id number]   YOB: 1932    DATE OF ADMISSION: 6/14/2023 10:58 AM    DATE OF DISCHARGE: 06/17/23    PRIMARY CARE PROVIDER: Danae Zuleta MD     ATTENDING PHYSICIAN: Thalia Szymanski MD   DISCHARGING PROVIDER: Thalia Szymanski MD    To contact this individual call [phone] and ask the  to page. If unavailable ask to be transferred the Adult Hospitalist Department. CONSULTATIONS: IP CONSULT TO CASE MANAGEMENT  IP CONSULT TO HOSPITALIST  IP CONSULT TO CASE MANAGEMENT  IP CONSULT TO DIETITIAN  IP CONSULT TO PALLIATIVE CARE  IP CONSULT TO CASE MANAGEMENT    PROCEDURES/SURGERIES: * No surgery found *     ADMITTING DIAGNOSES & HOSPITAL COURSE:   HPI: Agnieszka Krishna is a 80 y.o. female who presents from her long-term care facility for evaluation of unresponsiveness. Patient was noted to be well last evening. This morning, patient was noted by staff at her LTC to be unresponsive. Initial blood sugars noted to be 33. EMS were called and subsequently presented to the emergency room. Of note, patient has not received any dextrose due to lack of IV access during on route. Subsequently patient has received glucagon in the ER, subsequent attempts to perform IV access were unsuccessful. ER physician has placed central line. Subsequently patient has received D10 and now running D5. Patient's mental status back to baseline now. In regards to her hypoglycemia, etiology was unknown, home medications list not available at this time, not sure if patient on any diabetic medications. Further work-up in the ER shows that patient with leukocytosis with WBC count of over 18,000, UA was unremarkable chest x-ray shows streaky left basilar atelectasis/airspace disease. CT abdomen and pelvis shows abnormal appearance of the gallbladder neck and to consider follow-up dedicated ultrasound for further characterization.   Ultrasound pending at

## 2023-06-17 NOTE — CASE COMMUNICATION
Ms. Clarence Cobos is a 80year old female admitted  for unresponsiveness secondary to hypoglycemia, with a history of dementia. Patient is alert only to self. She is bed fast as an IP. While IP family made the decision for the patient to be transferred to hospital care once she was discharged to [facility] and [address] [place] [address]. Ambulance transport was contacted. Report called to Vidal Bales RN at [facility] and [address] [place] [address]. [facility] was unaware of patient's new hospice order. This RN advised her to call back if she had any further questions. [facility] confirmed patient had arrived at facility and was settled in.   (1433 hrs)

## 2023-06-19 LAB
BACTERIA SPEC CULT: NORMAL
BACTERIA SPEC CULT: NORMAL
SERVICE CMNT-IMP: NORMAL
SERVICE CMNT-IMP: NORMAL

## 2024-03-29 NOTE — CARE COORDINATION
Transition of Care Plan:    RUR: 12% Moderate   Prior Level of Functioning: Walker for ambulation. Assistance with bathing and dressing. Independent eating with puree diet   Disposition: LTC with Hospice admission expected   DME needed: none  Transportation at discharge: BLS   IM/IMM Medicare/ letter given: 06/16 2nd IM   Caregiver Contact: [address] great niece  Discharge Caregiver contacted prior to discharge? Yes  Care Conference needed? No  Barriers to discharge:  none     Disposition: Pt dc Saturday to LTC, pt's previous facility, [facility]. Weekend CM please call [facility] liaison [phone] - to confirm bed availability and obtain room number. [facility]/LTC expecting pt's return Saturday. [facility] has contacted family/guardian. Family in agreement with Hospice admission. [facility] planning to do assessment and consents at LTC Saturday at 2:00. [facility] through [facility] for Saturday 06/17 at 13:00 on stretcher. [facility] [phone].     Caregiver Contact:   Primary contact legal guardian - wei Wells Jamil [phone]  Sister secondary contact Francisco Otoole [phone]    EMILI Samuel 99.5